# Patient Record
Sex: MALE | Race: WHITE | NOT HISPANIC OR LATINO | Employment: FULL TIME | URBAN - METROPOLITAN AREA
[De-identification: names, ages, dates, MRNs, and addresses within clinical notes are randomized per-mention and may not be internally consistent; named-entity substitution may affect disease eponyms.]

---

## 2017-03-17 ENCOUNTER — OFFICE VISIT (OUTPATIENT)
Dept: INTERNAL MEDICINE | Facility: CLINIC | Age: 67
End: 2017-03-17

## 2017-03-17 VITALS
DIASTOLIC BLOOD PRESSURE: 76 MMHG | OXYGEN SATURATION: 97 % | BODY MASS INDEX: 23.4 KG/M2 | SYSTOLIC BLOOD PRESSURE: 118 MMHG | TEMPERATURE: 97.3 F | HEART RATE: 73 BPM | WEIGHT: 145 LBS

## 2017-03-17 DIAGNOSIS — R73.9 HYPERGLYCEMIA: ICD-10-CM

## 2017-03-17 DIAGNOSIS — E78.00 ELEVATED CHOLESTEROL: ICD-10-CM

## 2017-03-17 DIAGNOSIS — I35.0 NONRHEUMATIC AORTIC VALVE STENOSIS: ICD-10-CM

## 2017-03-17 DIAGNOSIS — I25.10 ATHEROSCLEROSIS OF NATIVE CORONARY ARTERY OF NATIVE HEART WITHOUT ANGINA PECTORIS: Primary | ICD-10-CM

## 2017-03-17 DIAGNOSIS — N18.30 CHRONIC RENAL IMPAIRMENT, STAGE 3 (MODERATE) (HCC): ICD-10-CM

## 2017-03-17 DIAGNOSIS — I10 BENIGN ESSENTIAL HTN: ICD-10-CM

## 2017-03-17 DIAGNOSIS — K52.9 NON-SPECIFIC COLITIS: ICD-10-CM

## 2017-03-17 PROCEDURE — 99214 OFFICE O/P EST MOD 30 MIN: CPT | Performed by: INTERNAL MEDICINE

## 2017-03-17 RX ORDER — BUDESONIDE 3 MG/1
9 CAPSULE, COATED PELLETS ORAL
COMMUNITY
Start: 2017-02-01 | End: 2017-03-17

## 2017-03-17 RX ORDER — LOSARTAN POTASSIUM AND HYDROCHLOROTHIAZIDE 25; 100 MG/1; MG/1
1 TABLET ORAL NIGHTLY
COMMUNITY
Start: 2017-03-13 | End: 2018-09-18 | Stop reason: SDUPTHER

## 2017-03-17 RX ORDER — SULFASALAZINE 500 MG/1
1 TABLET ORAL DAILY
COMMUNITY
Start: 2017-03-11 | End: 2018-07-03

## 2017-03-17 RX ORDER — DIPHENOXYLATE HYDROCHLORIDE AND ATROPINE SULFATE 2.5; .025 MG/1; MG/1
1 TABLET ORAL 4 TIMES DAILY PRN
Qty: 15 TABLET | Refills: 0 | OUTPATIENT
Start: 2017-03-17 | End: 2018-07-25

## 2017-03-17 NOTE — PROGRESS NOTES
Subjective   Bob Monahan is a 66 y.o. male.     History of Present Illness he is here today for follow-up of hypertension, hypercholesterolemia, coronary artery disease, chronic renal insufficiency, and hyperglycemia.  A few months ago he started having diarrhea once again and he was seen by his gastroenterologist.  He underwent colonoscopy and was found to have recurrent colitis.  The first medicine prescribed for him was too expensive and the second caused side effects.  He is about to try his third medication.  The main symptom was diarrhea and fortunately that is improving.  He denied any rectal pain or abdominal pain.  He has not had any melanotic stool.    Myocardial pulmonary standpoint he is been doing well.  He denies any chest pain, dyspnea, or PND.  He denies any focal neurologic symptoms.  His review of systems is otherwise unremarkable.        Review of Systems   Constitutional: Negative for activity change, appetite change, fatigue and fever.   HENT: Negative for trouble swallowing.    Respiratory: Negative for cough, chest tightness, shortness of breath and wheezing.    Cardiovascular: Negative for chest pain, palpitations and leg swelling.   Gastrointestinal: Negative for abdominal pain, anal bleeding, blood in stool, constipation, diarrhea, nausea and vomiting.   Genitourinary: Negative for difficulty urinating, dysuria, flank pain, frequency and hematuria.   Musculoskeletal: Negative for arthralgias and back pain.   Neurological: Positive for speech difficulty. Negative for dizziness, syncope, facial asymmetry, weakness, numbness and headaches.   Psychiatric/Behavioral: Negative for confusion and dysphoric mood. The patient is not nervous/anxious.        Objective   Physical Exam   Constitutional: He is oriented to person, place, and time. Vital signs are normal. He appears well-developed and well-nourished. He is active. He does not appear ill.   Eyes: Conjunctivae are normal.   Fundoscopic  exam:       The right eye shows no AV nicking, no exudate and no hemorrhage.        The left eye shows no AV nicking, no exudate and no hemorrhage.   Neck: Carotid bruit is not present. No thyroid mass and no thyromegaly present.   Cardiovascular: Normal rate, regular rhythm, S1 normal and S2 normal.  Exam reveals no S3 and no S4.    No murmur heard.  Pulses:       Dorsalis pedis pulses are 2+ on the right side, and 1+ on the left side.   Pulmonary/Chest: No tachypnea. No respiratory distress. He has no decreased breath sounds. He has no wheezes. He has no rhonchi. He has no rales.   Abdominal: Soft. Normal appearance and bowel sounds are normal. He exhibits no abdominal bruit and no mass. There is no hepatosplenomegaly. There is no tenderness.       Vascular Status -  His exam exhibits no right foot edema. His exam exhibits no left foot edema.  Neurological: He is alert and oriented to person, place, and time. Gait normal.   Reflex Scores:       Bicep reflexes are 2+ on the right side.  Psychiatric: He has a normal mood and affect. His speech is normal and behavior is normal. Judgment and thought content normal. Cognition and memory are normal.       Assessment/Plan  late 2016 lab showed a BUN of 32 and a creatinine of 1.24.  Glucose was 134.  Total cholesterol was 185 triglycerides 94 HDL cholesterol 47 LDL cholesterol 119    Assessment #1 hypertension-good control #2 coronary artery disease-quiescent #3 aortic stenosis-followed by cardiology and he is asymptomatic at present #3 nonspecific colitis-recent flareup but it appears to have resolved spontaneously #4 hypercholesterolemia-improved on lovastatin # 5 chronic renal insufficiency-mild and stable #6 hyperglycemia-this was discussed with him.    Plan #1 no change medication #2 hemoglobin A1c, CMP, bruits.  Routine follow-up with me in 4 months.

## 2017-03-22 ENCOUNTER — TELEPHONE (OUTPATIENT)
Dept: INTERNAL MEDICINE | Facility: CLINIC | Age: 67
End: 2017-03-22

## 2017-03-22 NOTE — TELEPHONE ENCOUNTER
I just saw him less than 2 weeks ago.  At that time he felt diarrhea was improved.  If he is continuing to have difficulty obviously he needs to call Dr. Cisco Barron his gastroenterologist.

## 2017-03-22 NOTE — TELEPHONE ENCOUNTER
----- Message from Geneva Us sent at 3/22/2017  8:10 AM EDT -----  Contact: pt - Dr Chowdhury's pt - RE: return call  Pt calling and would like a return call regarding issues w/ intestines. Pt would like for Dr Chowdhury to call Dr Barron Gastro. Pt had colonoscopy. Pt would like for Dr Chowdhury to call to discuss outcome of procedure. Pt also would like to inform that pt was prescribed Prednisone for diarrhea. Pt informs has a heart condition. Pt would like return call from Dr Chowdhury only. Could you please return call? Please advise. Thanks        Pt # [phone]

## 2017-03-31 ENCOUNTER — LAB (OUTPATIENT)
Dept: INTERNAL MEDICINE | Facility: CLINIC | Age: 67
End: 2017-03-31

## 2017-03-31 DIAGNOSIS — R73.9 HYPERGLYCEMIA: ICD-10-CM

## 2017-03-31 DIAGNOSIS — N18.30 CHRONIC RENAL IMPAIRMENT, STAGE 3 (MODERATE) (HCC): ICD-10-CM

## 2017-03-31 DIAGNOSIS — E78.00 ELEVATED CHOLESTEROL: ICD-10-CM

## 2017-03-31 LAB
ALBUMIN SERPL-MCNC: 3.49 G/DL (ref 3.4–4.6)
ALBUMIN/GLOB SERPL: 0.9 G/DL
ALP SERPL-CCNC: 87 U/L (ref 46–116)
ALT SERPL W P-5'-P-CCNC: 39 U/L (ref 16–63)
ANION GAP SERPL CALCULATED.3IONS-SCNC: 12 MMOL/L
AST SERPL-CCNC: 18 U/L (ref 7–37)
BILIRUB SERPL-MCNC: 0.4 MG/DL (ref 0.2–1)
BUN BLD-MCNC: 51 MG/DL (ref 6–22)
BUN/CREAT SERPL: 37 (ref 7–25)
CALCIUM SPEC-SCNC: 9 MG/DL (ref 8.6–10.5)
CHLORIDE SERPL-SCNC: 102 MMOL/L (ref 95–107)
CHOLEST SERPL-MCNC: 202 MG/DL (ref 0–200)
CO2 SERPL-SCNC: 21 MMOL/L (ref 23–32)
CREAT BLD-MCNC: 1.38 MG/DL (ref 0.7–1.3)
GFR SERPL CREATININE-BSD FRML MDRD: 52 ML/MIN/1.73
GLOBULIN UR ELPH-MCNC: 3.7 GM/DL
GLUCOSE BLD-MCNC: 109 MG/DL (ref 70–100)
HBA1C MFR BLD: 6.2 % (ref 4–6)
HDLC SERPL-MCNC: 57 MG/DL (ref 40–81)
LDLC SERPL CALC-MCNC: 111 MG/DL (ref 0–100)
LDLC/HDLC SERPL: 1.95 {RATIO}
POTASSIUM BLD-SCNC: 4.7 MMOL/L (ref 3.3–5.3)
PROT SERPL-MCNC: 7.2 G/DL (ref 6.3–8.4)
SODIUM BLD-SCNC: 135 MMOL/L (ref 136–145)
TRIGL SERPL-MCNC: 170 MG/DL (ref 0–150)
VLDLC SERPL-MCNC: 34 MG/DL

## 2017-03-31 PROCEDURE — 80053 COMPREHEN METABOLIC PANEL: CPT | Performed by: INTERNAL MEDICINE

## 2017-03-31 PROCEDURE — 83036 HEMOGLOBIN GLYCOSYLATED A1C: CPT | Performed by: INTERNAL MEDICINE

## 2017-03-31 PROCEDURE — 36415 COLL VENOUS BLD VENIPUNCTURE: CPT | Performed by: INTERNAL MEDICINE

## 2017-03-31 PROCEDURE — 80061 LIPID PANEL: CPT | Performed by: INTERNAL MEDICINE

## 2017-04-03 RX ORDER — BUPROPION HYDROCHLORIDE 150 MG/1
TABLET ORAL
Qty: 90 TABLET | Refills: 1 | Status: SHIPPED | OUTPATIENT
Start: 2017-04-03 | End: 2017-10-07 | Stop reason: SDUPTHER

## 2017-07-27 RX ORDER — LOVASTATIN 40 MG/1
TABLET ORAL
Qty: 90 TABLET | Refills: 0 | Status: SHIPPED | OUTPATIENT
Start: 2017-07-27 | End: 2017-10-24 | Stop reason: SDUPTHER

## 2017-10-09 RX ORDER — BUPROPION HYDROCHLORIDE 150 MG/1
TABLET ORAL
Qty: 90 TABLET | Refills: 0 | Status: SHIPPED | OUTPATIENT
Start: 2017-10-09 | End: 2018-01-11 | Stop reason: SDUPTHER

## 2017-10-24 RX ORDER — LOVASTATIN 40 MG/1
TABLET ORAL
Qty: 90 TABLET | Refills: 0 | Status: SHIPPED | OUTPATIENT
Start: 2017-10-24 | End: 2018-01-11 | Stop reason: SDUPTHER

## 2018-01-09 ENCOUNTER — TELEPHONE (OUTPATIENT)
Dept: INTERNAL MEDICINE | Facility: CLINIC | Age: 68
End: 2018-01-09

## 2018-01-09 NOTE — TELEPHONE ENCOUNTER
"----- Message from Liliana Schmidt sent at 1/9/2018  8:53 AM EST -----  Contact: pt  Pt doesn't have an appt [facility] march  But would like ALL his meds filled.  I said that it was un likely but I would send the request back.  He is out of town and running out of meds.         buPROPion XL (WELLBUTRIN XL) 150 MG 24 hr tablet   clopidogrel (PLAVIX) 75 MG tablet    diphenoxylate-atropine (LOMOTIL) 2.5-0.025 MG per tablet   hydrochlorothiazide (HYDRODIURIL) 12.5 MG tablet   hydroxychloroquine (PLAQUENIL) 200 MG tablet   isosorbide mononitrate (IMDUR) 120 MG 24 hr tablet   losartan-hydrochlorothiazide (HYZAAR) 100-25 MG per tablet    lovastatin (MEVACOR) 40 MG tablet   metoprolol succinate XL (TOPROL-XL) 50 MG 24 hr tablet   nitroglycerin (NITROSTAT) 0.4 MG SL tablet   sulfaSALAzine (AZULFIDINE) 500 MG tablet      [facility] [address] [place] [address] [facility] - [phone] [facility] [phone] FX  \"they can transfer\"    Pt#[phone]    "

## 2018-01-09 NOTE — TELEPHONE ENCOUNTER
There is no appt in system for appt in March his last OV was March of 2017, it has been noted on last refills that patient needed an appt before another refill I have left message on vm for him to inform him that an OV is required before any refills

## 2018-01-11 ENCOUNTER — TELEPHONE (OUTPATIENT)
Dept: INTERNAL MEDICINE | Facility: CLINIC | Age: 68
End: 2018-01-11

## 2018-01-11 RX ORDER — BUPROPION HYDROCHLORIDE 150 MG/1
TABLET ORAL
Qty: 90 TABLET | Refills: 1 | Status: SHIPPED | OUTPATIENT
Start: 2018-01-11 | End: 2018-07-03

## 2018-01-11 RX ORDER — LOVASTATIN 40 MG/1
40 TABLET ORAL NIGHTLY
Qty: 90 TABLET | Refills: 3 | Status: SHIPPED | OUTPATIENT
Start: 2018-01-11 | End: 2018-01-12 | Stop reason: SDUPTHER

## 2018-01-11 NOTE — TELEPHONE ENCOUNTER
----- Message from Liliana Schmidt sent at 1/10/2018  8:29 AM EST -----  Contact: pt  Pt would like a refill for    lovastatin (MEVACOR) 40 MG tablet  buPROPion XL (WELLBUTRIN XL) 150 MG 24 hr tablet    [facility] [address] [facility] [address] [facility] [address] AT [facility] - [phone] [facility] [phone] FX      He is sorry he didn't mean all, and he didn't mean to say he had an appt. He said he will make one for march.  He will make one while on the phone now.    I asked if he received the message and he said yes but wanted me to send another message back.    Pt#[phone]

## 2018-01-12 RX ORDER — LOVASTATIN 40 MG/1
40 TABLET ORAL NIGHTLY
Qty: 30 TABLET | Refills: 0 | Status: SHIPPED | OUTPATIENT
Start: 2018-01-12 | End: 2018-09-18 | Stop reason: SDUPTHER

## 2018-03-16 ENCOUNTER — OFFICE VISIT (OUTPATIENT)
Dept: INTERNAL MEDICINE | Facility: CLINIC | Age: 68
End: 2018-03-16

## 2018-03-16 VITALS
DIASTOLIC BLOOD PRESSURE: 74 MMHG | HEIGHT: 66 IN | RESPIRATION RATE: 14 BRPM | SYSTOLIC BLOOD PRESSURE: 120 MMHG | BODY MASS INDEX: 24.43 KG/M2 | WEIGHT: 152 LBS

## 2018-03-16 DIAGNOSIS — I25.10 CORONARY ARTERY DISEASE INVOLVING NATIVE CORONARY ARTERY OF NATIVE HEART WITHOUT ANGINA PECTORIS: Primary | ICD-10-CM

## 2018-03-16 DIAGNOSIS — I35.0 NONRHEUMATIC AORTIC VALVE STENOSIS: ICD-10-CM

## 2018-03-16 DIAGNOSIS — I10 BENIGN ESSENTIAL HTN: ICD-10-CM

## 2018-03-16 DIAGNOSIS — I73.9 PERIPHERAL VASCULAR DISEASE (HCC): ICD-10-CM

## 2018-03-16 DIAGNOSIS — E78.00 ELEVATED CHOLESTEROL: ICD-10-CM

## 2018-03-16 DIAGNOSIS — N18.30 CHRONIC RENAL IMPAIRMENT, STAGE 3 (MODERATE) (HCC): ICD-10-CM

## 2018-03-16 PROBLEM — K52.9 COLITIS: Status: ACTIVE | Noted: 2018-03-16

## 2018-03-16 PROCEDURE — 99214 OFFICE O/P EST MOD 30 MIN: CPT | Performed by: INTERNAL MEDICINE

## 2018-03-16 RX ORDER — BALSALAZIDE DISODIUM 750 MG/1
3000 CAPSULE ORAL 2 TIMES DAILY
COMMUNITY
Start: 2018-01-26 | End: 2018-07-14 | Stop reason: HOSPADM

## 2018-03-16 NOTE — PROGRESS NOTES
Subjective   Bob Monahan is a 67 y.o. male.     History of Present Illness he is here today for his yearly follow-up which includes coronary artery disease, aortic stenosis, hypercholesterolemia, hypertension, chronic renal insufficiency, and now peripheral vascular disease.  He has not been seen in over year.  He was seen by his gastroenterologist last year and found to have recurrent colitis for which she is on medication.  He is no longer having any abdominal pain, melanotic stool, rectal bleeding, or diarrhea.  He does relate a one year history of slowly worsening dysphagia however.  Twice per week he has dysphagia without odynophagia.  He denies any overt reflux.  He has chronic stable one flight dyspnea on exertion without anginal chest pain.  He denies any PND, swelling in the ankles, dizziness, or focal neurologic episodes.  He does relate a one year history of cramping in his left calf muscle when walking a certain distance.  He is still doing significant manual labor for work.  He has one per night nocturia but no dysuria, hesitancy, or sense of incomplete voiding.  The remainder of his review systems is negative.      Review of Systems   Constitutional: Negative for activity change, appetite change and fatigue.   HENT: Positive for trouble swallowing.    Respiratory: Positive for shortness of breath. Negative for cough, chest tightness and wheezing.    Cardiovascular: Negative for chest pain, palpitations and leg swelling.   Gastrointestinal: Negative for abdominal pain, anal bleeding, blood in stool, constipation, nausea, vomiting and GERD.   Genitourinary: Negative for difficulty urinating, dysuria, flank pain, frequency, hematuria and nocturia.   Neurological: Negative for dizziness, syncope, facial asymmetry, speech difficulty, weakness, numbness and headaches.   Psychiatric/Behavioral: Negative for depressed mood. The patient is not nervous/anxious.        Objective   Physical Exam    Constitutional: He is oriented to person, place, and time. Vital signs are normal. He appears well-developed and well-nourished. He is active. He does not appear ill.   Eyes: Conjunctivae are normal.   Fundoscopic exam:       The right eye shows no AV nicking, no exudate and no hemorrhage.        The left eye shows no AV nicking, no exudate and no hemorrhage.   Neck: Carotid bruit is not present. Thyromegaly present. No thyroid mass present.   Cardiovascular: Normal rate, regular rhythm and S2 normal.  Exam reveals no S3 and no S4.    Murmur heard.   Crescendo systolic murmur is present with a grade of 4/6   Pulses:       Dorsalis pedis pulses are 2+ on the right side, and 0 on the left side.   There is a grade 4/6 murmur of aortic stenosis heard best at the base radiating down the left sternal border toward the apex.   Pulmonary/Chest: No tachypnea. No respiratory distress. He has no decreased breath sounds. He has no wheezes. He has no rhonchi. He has no rales.   Abdominal: Soft. Normal appearance and bowel sounds are normal. He exhibits no abdominal bruit and no mass. There is no hepatosplenomegaly. There is no tenderness.     Vascular Status -  His right foot exhibits normal right foot edema. His left foot exhibits normal left foot edema.  Neurological: He is alert and oriented to person, place, and time. Gait normal.   Reflex Scores:       Bicep reflexes are 2+ on the right side.  Psychiatric: He has a normal mood and affect. His speech is normal and behavior is normal. Judgment and thought content normal. Cognition and memory are normal.         Assessment/Plan assessment #1 coronary artery disease-quiescent #2 aortic stenosis-no progression thus far #3 hypertension-good control #4hypercholesterolemia #5 new-onset peripheral vascular disease.  #6 dysphagia-he needs to have this evaluated by his gastroenterologist.    Plan #1 no change medication #2 CBC, CMP, urinalysis, lipids.  #3 he is to schedule  follow-up with his gastroenterologist #4 left lower extremity arterial Doppler study as soon as possible.  Likely he will need vascular surgery consultation and perhaps intervention.  All of this was discussed with him.  Routine follow-up with me in 3 months.

## 2018-03-22 ENCOUNTER — LAB (OUTPATIENT)
Dept: INTERNAL MEDICINE | Facility: CLINIC | Age: 68
End: 2018-03-22

## 2018-03-22 DIAGNOSIS — N18.30 CHRONIC RENAL IMPAIRMENT, STAGE 3 (MODERATE) (HCC): ICD-10-CM

## 2018-03-22 DIAGNOSIS — I73.9 PERIPHERAL VASCULAR DISEASE (HCC): Primary | ICD-10-CM

## 2018-03-22 DIAGNOSIS — I10 BENIGN ESSENTIAL HTN: ICD-10-CM

## 2018-03-22 DIAGNOSIS — E78.00 ELEVATED CHOLESTEROL: ICD-10-CM

## 2018-03-22 LAB
ALBUMIN SERPL-MCNC: 4.1 G/DL (ref 3.5–5.2)
ALBUMIN/GLOB SERPL: 1.1 G/DL
ALP SERPL-CCNC: 107 U/L (ref 39–117)
ALT SERPL W P-5'-P-CCNC: 14 U/L (ref 1–41)
ANION GAP SERPL CALCULATED.3IONS-SCNC: 13.9 MMOL/L
AST SERPL-CCNC: 33 U/L (ref 1–40)
BASOPHILS # BLD AUTO: 0.04 10*3/MM3 (ref 0–0.2)
BASOPHILS NFR BLD AUTO: 0.6 % (ref 0–1.5)
BILIRUB SERPL-MCNC: 0.5 MG/DL (ref 0.1–1.2)
BILIRUB UR QL STRIP: NEGATIVE
BUN BLD-MCNC: 33 MG/DL (ref 8–23)
BUN/CREAT SERPL: 22.8 (ref 7–25)
CALCIUM SPEC-SCNC: 9.5 MG/DL (ref 8.6–10.5)
CHLORIDE SERPL-SCNC: 99 MMOL/L (ref 98–107)
CHOLEST SERPL-MCNC: 157 MG/DL (ref 0–200)
CLARITY UR: CLEAR
CO2 SERPL-SCNC: 25.1 MMOL/L (ref 22–29)
COLOR UR: YELLOW
CREAT BLD-MCNC: 1.45 MG/DL (ref 0.76–1.27)
EOSINOPHIL # BLD AUTO: 0.25 10*3/MM3 (ref 0–0.7)
EOSINOPHIL # BLD AUTO: 3.6 % (ref 0.3–6.2)
ERYTHROCYTE [DISTWIDTH] IN BLOOD BY AUTOMATED COUNT: 14.2 % (ref 11.5–14.5)
GFR SERPL CREATININE-BSD FRML MDRD: 49 ML/MIN/1.73
GLOBULIN UR ELPH-MCNC: 3.6 GM/DL
GLUCOSE BLD-MCNC: 116 MG/DL (ref 65–99)
GLUCOSE UR STRIP-MCNC: NEGATIVE MG/DL
HCT VFR BLD AUTO: 50.2 % (ref 40.4–52.2)
HDLC SERPL-MCNC: 44 MG/DL (ref 40–60)
HGB BLD-MCNC: 16.7 G/DL (ref 13.7–17.6)
HGB UR QL STRIP.AUTO: NEGATIVE
IMM GRANULOCYTES # BLD: 0 10*3/MM3 (ref 0–0.03)
IMM GRANULOCYTES NFR BLD: 0 % (ref 0–0.5)
KETONES UR QL STRIP: NEGATIVE
LDLC SERPL CALC-MCNC: 92 MG/DL (ref 0–100)
LDLC/HDLC SERPL: 2.09 {RATIO}
LEUKOCYTE ESTERASE UR QL STRIP.AUTO: NEGATIVE
LYMPHOCYTES # BLD AUTO: 1.64 10*3/MM3 (ref 0.9–4.8)
LYMPHOCYTES NFR BLD AUTO: 23.4 % (ref 19.6–45.3)
MCH RBC QN AUTO: 32.3 PG (ref 27–32.7)
MCHC RBC AUTO-ENTMCNC: 33.3 G/DL (ref 32.6–36.4)
MCV RBC AUTO: 97.1 FL (ref 79.8–96.2)
MONOCYTES # BLD AUTO: 0.49 10*3/MM3 (ref 0.2–1.2)
MONOCYTES NFR BLD AUTO: 7 % (ref 5–12)
NEUTROPHILS # BLD AUTO: 4.59 10*3/MM3 (ref 1.9–8.1)
NEUTROPHILS NFR BLD AUTO: 65.4 % (ref 42.7–76)
NITRITE UR QL STRIP: NEGATIVE
PH UR STRIP.AUTO: 7 [PH] (ref 5–8)
PLATELET # BLD AUTO: 205 10*3/MM3 (ref 140–500)
POTASSIUM BLD-SCNC: 4.3 MMOL/L (ref 3.5–5.2)
PROT SERPL-MCNC: 7.7 G/DL (ref 6–8.5)
PROT UR QL STRIP: NEGATIVE
RBC # BLD AUTO: 5.17 10*6/MM3 (ref 4.6–6)
SODIUM BLD-SCNC: 138 MMOL/L (ref 136–145)
SP GR UR STRIP: 1.01 (ref 1–1.03)
TRIGL SERPL-MCNC: 106 MG/DL (ref 0–150)
UROBILINOGEN UR QL STRIP: NORMAL
VLDLC SERPL-MCNC: 21.2 MG/DL (ref 5–40)
WBC # BLD AUTO: 7.01 10*3/MM3 (ref 4.5–10.7)

## 2018-03-22 PROCEDURE — 80061 LIPID PANEL: CPT | Performed by: INTERNAL MEDICINE

## 2018-03-22 PROCEDURE — 81003 URINALYSIS AUTO W/O SCOPE: CPT | Performed by: INTERNAL MEDICINE

## 2018-03-22 PROCEDURE — 80053 COMPREHEN METABOLIC PANEL: CPT | Performed by: INTERNAL MEDICINE

## 2018-03-23 ENCOUNTER — HOSPITAL ENCOUNTER (OUTPATIENT)
Dept: CARDIOLOGY | Facility: HOSPITAL | Age: 68
Discharge: HOME | End: 2018-03-23
Attending: INTERNAL MEDICINE | Admitting: INTERNAL MEDICINE

## 2018-03-23 DIAGNOSIS — I73.9 PERIPHERAL VASCULAR DISEASE (HCC): ICD-10-CM

## 2018-03-23 LAB
BH CV LOWER ARTERIAL LEFT ABI RATIO: 0.88
BH CV LOWER ARTERIAL LEFT CALF RATIO: 0.84
BH CV LOWER ARTERIAL LEFT DORSALIS PEDIS SYS MAX: 82 MMHG
BH CV LOWER ARTERIAL LEFT GREAT TOE SYS MAX: 66 MMHG
BH CV LOWER ARTERIAL LEFT HIGH THIGH SYS MAX: 111 MMHG
BH CV LOWER ARTERIAL LEFT LOW THIGH SYS MAX: 109 MMHG
BH CV LOWER ARTERIAL LEFT POPLITEAL SYS MAX: 83 MMHG
BH CV LOWER ARTERIAL LEFT POST EX ABI RATIO: 0.47
BH CV LOWER ARTERIAL LEFT POST TIBIAL SYS MAX: 87 MMHG
BH CV LOWER ARTERIAL LEFT TBI RATIO: 0.67
BH CV LOWER ARTERIAL RIGHT ABI RATIO: 1.08
BH CV LOWER ARTERIAL RIGHT CALF RATIO: 1.1
BH CV LOWER ARTERIAL RIGHT DORSALIS PEDIS SYS MAX: 107 MMHG
BH CV LOWER ARTERIAL RIGHT GREAT TOE SYS MAX: 93 MMHG
BH CV LOWER ARTERIAL RIGHT HIGH THIGH SYS MAX: 131 MMHG
BH CV LOWER ARTERIAL RIGHT LOW THIGH SYS MAX: 129 MMHG
BH CV LOWER ARTERIAL RIGHT POPLITEAL SYS MAX: 109 MMHG
BH CV LOWER ARTERIAL RIGHT POST EX ABI RATIO: 0.81
BH CV LOWER ARTERIAL RIGHT POST TIBIAL SYS MAX: 102 MMHG
BH CV LOWER ARTERIAL RIGHT TBI RATIO: 0.94
UPPER ARTERIAL LEFT ARM BRACHIAL SYS MAX: 66 MMHG
UPPER ARTERIAL RIGHT ARM BRACHIAL SYS MAX: 99 MMHG

## 2018-03-23 PROCEDURE — 93924 LWR XTR VASC STDY BILAT: CPT

## 2018-03-23 NOTE — PROGRESS NOTES
Lower extremity arterial doppler without stress ordered for 03/23/18 by Dr. Chowdhury. Indication for exam was claudication. Patient complained of left calf tightness when walking. Called Dr. Chowdhury's office at 1250 to get ordered changed. He gave verbal approval to change order to a lower extremity arterial doppler with stress.

## 2018-03-29 ENCOUNTER — TELEPHONE (OUTPATIENT)
Dept: INTERNAL MEDICINE | Facility: CLINIC | Age: 68
End: 2018-03-29

## 2018-03-29 DIAGNOSIS — I73.9 PERIPHERAL VASCULAR DISEASE (HCC): Primary | ICD-10-CM

## 2018-03-29 NOTE — TELEPHONE ENCOUNTER
----- Message from Melani Madrigal sent at 3/29/2018  8:17 AM EDT -----  Contact: Patient  Please call patient with ultra sound results. Please advise    Patient:[phone]

## 2018-05-15 ENCOUNTER — TRANSCRIBE ORDERS (OUTPATIENT)
Dept: ADMINISTRATIVE | Facility: HOSPITAL | Age: 68
End: 2018-05-15

## 2018-05-15 DIAGNOSIS — I65.23 CAROTID STENOSIS, BILATERAL: Primary | ICD-10-CM

## 2018-06-01 ENCOUNTER — HOSPITAL ENCOUNTER (OUTPATIENT)
Dept: CT IMAGING | Facility: HOSPITAL | Age: 68
Discharge: HOME | End: 2018-06-01
Attending: SURGERY

## 2018-06-01 ENCOUNTER — HOSPITAL ENCOUNTER (OUTPATIENT)
Dept: RADIOLOGY | Facility: HOSPITAL | Age: 68
Discharge: HOME | End: 2018-06-01
Attending: SURGERY

## 2018-06-01 ENCOUNTER — HOSPITAL ENCOUNTER (OUTPATIENT)
Dept: RADIOLOGY | Facility: HOSPITAL | Age: 68
Discharge: HOME | End: 2018-06-01
Attending: SURGERY | Admitting: SURGERY

## 2018-06-01 VITALS
OXYGEN SATURATION: 98 % | DIASTOLIC BLOOD PRESSURE: 62 MMHG | HEART RATE: 82 BPM | TEMPERATURE: 97.1 F | SYSTOLIC BLOOD PRESSURE: 111 MMHG | RESPIRATION RATE: 16 BRPM

## 2018-06-01 DIAGNOSIS — I65.23 CAROTID STENOSIS, BILATERAL: ICD-10-CM

## 2018-06-01 LAB — CREAT BLDA-MCNC: 1.6 MG/DL (ref 0.6–1.3)

## 2018-06-01 PROCEDURE — 70496 CT ANGIOGRAPHY HEAD: CPT

## 2018-06-01 PROCEDURE — 70498 CT ANGIOGRAPHY NECK: CPT

## 2018-06-01 PROCEDURE — 96360 HYDRATION IV INFUSION INIT: CPT

## 2018-06-01 PROCEDURE — 82565 ASSAY OF CREATININE: CPT

## 2018-06-01 PROCEDURE — 96361 HYDRATE IV INFUSION ADD-ON: CPT

## 2018-06-01 PROCEDURE — 0 IOPAMIDOL PER 1 ML: Performed by: SURGERY

## 2018-06-01 RX ORDER — SODIUM CHLORIDE 9 MG/ML
100 INJECTION, SOLUTION INTRAVENOUS CONTINUOUS
Status: ACTIVE | OUTPATIENT
Start: 2018-06-01 | End: 2018-06-01

## 2018-06-01 RX ORDER — HYDROXYCHLOROQUINE SULFATE 200 MG/1
200 TABLET, FILM COATED ORAL
COMMUNITY
Start: 2013-03-13 | End: 2018-06-01

## 2018-06-01 RX ADMIN — IOPAMIDOL 95 ML: 755 INJECTION, SOLUTION INTRAVENOUS at 11:04

## 2018-06-01 RX ADMIN — SODIUM CHLORIDE 100 ML/HR: 9 INJECTION, SOLUTION INTRAVENOUS at 09:50

## 2018-06-01 NOTE — NURSING NOTE
Fluids finished infusing. Patient's IV discontinued and patient ambulated towards [facility] without difficulty.

## 2018-06-18 ENCOUNTER — OFFICE VISIT (OUTPATIENT)
Dept: INTERNAL MEDICINE | Facility: CLINIC | Age: 68
End: 2018-06-18

## 2018-06-18 VITALS
WEIGHT: 151 LBS | SYSTOLIC BLOOD PRESSURE: 102 MMHG | HEIGHT: 66 IN | DIASTOLIC BLOOD PRESSURE: 70 MMHG | BODY MASS INDEX: 24.27 KG/M2

## 2018-06-18 DIAGNOSIS — E78.00 ELEVATED CHOLESTEROL: ICD-10-CM

## 2018-06-18 DIAGNOSIS — I10 BENIGN ESSENTIAL HTN: ICD-10-CM

## 2018-06-18 DIAGNOSIS — K52.9 COLITIS: ICD-10-CM

## 2018-06-18 DIAGNOSIS — I65.22 STENOSIS OF LEFT CAROTID ARTERY: Primary | ICD-10-CM

## 2018-06-18 DIAGNOSIS — I25.10 CORONARY ARTERY DISEASE INVOLVING NATIVE CORONARY ARTERY OF NATIVE HEART WITHOUT ANGINA PECTORIS: ICD-10-CM

## 2018-06-18 PROBLEM — I73.9 PERIPHERAL VASCULAR DISEASE: Status: RESOLVED | Noted: 2018-03-16 | Resolved: 2018-06-18

## 2018-06-18 PROCEDURE — 99213 OFFICE O/P EST LOW 20 MIN: CPT | Performed by: INTERNAL MEDICINE

## 2018-06-18 NOTE — PROGRESS NOTES
Subjective   Bob Monahan is a 68 y.o. male.     History of Present Illness he is here today for follow-up of coronary artery disease, chronic renal insufficiency, and recently diagnosed left carotid artery stenosis.  He was referred to vascular surgery a few months ago for what was expected to be peripheral vascular disease involving the left lower extremity.  Evaluation proved essentially normal but complete review ultimately showed significant left carotid artery stenosis.  He is scheduled for endarterectomy next month.  He has been asymptomatic without any dizziness or focal neurologic episodes.  He has already been seen by his cardiologist and cleared for surgery.  He denies any PND or chest discomfort.  He does not have any dyspnea on exertion.  There has been no swelling in the ankles.        Review of Systems   Constitutional: Negative for activity change, appetite change and fatigue.   HENT: Negative for trouble swallowing.    Respiratory: Negative for cough, chest tightness, shortness of breath and wheezing.    Cardiovascular: Negative for chest pain, palpitations and leg swelling.   Gastrointestinal: Negative for abdominal pain.   Genitourinary: Negative for flank pain and hematuria.   Neurological: Negative for dizziness, syncope, facial asymmetry, speech difficulty, weakness, numbness and headache.   Psychiatric/Behavioral: Negative for depressed mood. The patient is not nervous/anxious.        Objective   Physical Exam   Constitutional: He is oriented to person, place, and time. He appears well-developed and well-nourished. He is active. He does not appear ill.   Eyes: Conjunctivae are normal.   Neck: Carotid bruit is not present.   Cardiovascular: Normal rate, regular rhythm, S1 normal and S2 normal.  Exam reveals no S3 and no S4.    Murmur heard.   Systolic murmur is present with a grade of 3/6   There is a grade 3 to 4/6 systolic ejection murmur heard best at the base radiating down the left  sternal border   Pulmonary/Chest: No tachypnea. No respiratory distress. He has no decreased breath sounds. He has no wheezes. He has no rhonchi. He has no rales.   Abdominal: Soft. Normal appearance and bowel sounds are normal. He exhibits no abdominal bruit and no mass. There is no hepatosplenomegaly. There is no tenderness.     Vascular Status -  His right foot exhibits no edema. His left foot exhibits no edema.  Neurological: He is alert and oriented to person, place, and time. Gait normal.   Psychiatric: He has a normal mood and affect. His speech is normal and behavior is normal. Judgment and thought content normal. Cognition and memory are normal.         Assessment/Plan recent lab showed BUN 33 creatinine 1.45.  CBC normal.  Total cholesterol 157 triglycerides 106 HDL cholesterol 44 LDL cholesterol 92.  Arterial Doppler of the legs showed mild digital disease on the left and normal findings on the right.  CT Naa of the head showed left subclavian artery stenosis of moderate degree.  The left carotid artery was greater than 70% stenosed.    Assessment #1 hypercholesterolemia-fair control #2 chronic renal insufficiency-moderate and stable #3 cerebral vascular disease-asymptomatic at present    Plan #1 no change medication #2 carotid artery endarterectomy scheduled for July 13.  Routine follow-up in 4 months.

## 2018-07-03 ENCOUNTER — HOSPITAL ENCOUNTER (OUTPATIENT)
Dept: GENERAL RADIOLOGY | Facility: HOSPITAL | Age: 68
Discharge: HOME | End: 2018-07-03
Admitting: SURGERY

## 2018-07-03 ENCOUNTER — APPOINTMENT (OUTPATIENT)
Dept: PREADMISSION TESTING | Facility: HOSPITAL | Age: 68
End: 2018-07-03

## 2018-07-03 VITALS
WEIGHT: 155 LBS | DIASTOLIC BLOOD PRESSURE: 75 MMHG | HEART RATE: 65 BPM | TEMPERATURE: 98.2 F | HEIGHT: 66 IN | BODY MASS INDEX: 24.91 KG/M2 | OXYGEN SATURATION: 98 % | RESPIRATION RATE: 20 BRPM | SYSTOLIC BLOOD PRESSURE: 124 MMHG

## 2018-07-03 LAB
ALBUMIN SERPL-MCNC: 4.4 G/DL (ref 3.5–5.2)
ALBUMIN/GLOB SERPL: 1.6 G/DL
ALP SERPL-CCNC: 104 U/L (ref 39–117)
ALT SERPL W P-5'-P-CCNC: 11 U/L (ref 1–41)
ANION GAP SERPL CALCULATED.3IONS-SCNC: 12.9 MMOL/L
APTT PPP: 27.3 SECONDS (ref 22.7–35.4)
AST SERPL-CCNC: 18 U/L (ref 1–40)
BACTERIA UR QL AUTO: NORMAL /HPF
BILIRUB SERPL-MCNC: 0.3 MG/DL (ref 0.1–1.2)
BILIRUB UR QL STRIP: NEGATIVE
BUN BLD-MCNC: 39 MG/DL (ref 8–23)
BUN/CREAT SERPL: 19.8 (ref 7–25)
CALCIUM SPEC-SCNC: 9.2 MG/DL (ref 8.6–10.5)
CHLORIDE SERPL-SCNC: 100 MMOL/L (ref 98–107)
CLARITY UR: CLEAR
CO2 SERPL-SCNC: 25.1 MMOL/L (ref 22–29)
COLOR UR: YELLOW
CREAT BLD-MCNC: 1.97 MG/DL (ref 0.76–1.27)
DEPRECATED RDW RBC AUTO: 48.2 FL (ref 37–54)
ERYTHROCYTE [DISTWIDTH] IN BLOOD BY AUTOMATED COUNT: 13.7 % (ref 11.5–14.5)
GFR SERPL CREATININE-BSD FRML MDRD: 34 ML/MIN/1.73
GLOBULIN UR ELPH-MCNC: 2.8 GM/DL
GLUCOSE BLD-MCNC: 83 MG/DL (ref 65–99)
GLUCOSE UR STRIP-MCNC: NEGATIVE MG/DL
HCT VFR BLD AUTO: 45.2 % (ref 40.4–52.2)
HGB BLD-MCNC: 15.2 G/DL (ref 13.7–17.6)
HGB UR QL STRIP.AUTO: NEGATIVE
HYALINE CASTS UR QL AUTO: NORMAL /LPF
INR PPP: 1.02 (ref 0.9–1.1)
KETONES UR QL STRIP: NEGATIVE
LEUKOCYTE ESTERASE UR QL STRIP.AUTO: NEGATIVE
MCH RBC QN AUTO: 32.5 PG (ref 27–32.7)
MCHC RBC AUTO-ENTMCNC: 33.6 G/DL (ref 32.6–36.4)
MCV RBC AUTO: 96.8 FL (ref 79.8–96.2)
NITRITE UR QL STRIP: NEGATIVE
PH UR STRIP.AUTO: 6.5 [PH] (ref 5–8)
PLATELET # BLD AUTO: 177 10*3/MM3 (ref 140–500)
PMV BLD AUTO: 10 FL (ref 6–12)
POTASSIUM BLD-SCNC: 4.7 MMOL/L (ref 3.5–5.2)
PROT SERPL-MCNC: 7.2 G/DL (ref 6–8.5)
PROT UR QL STRIP: NEGATIVE
PROTHROMBIN TIME: 13.2 SECONDS (ref 11.7–14.2)
RBC # BLD AUTO: 4.67 10*6/MM3 (ref 4.6–6)
RBC # UR: NORMAL /HPF
REF LAB TEST METHOD: NORMAL
SODIUM BLD-SCNC: 138 MMOL/L (ref 136–145)
SP GR UR STRIP: 1.02 (ref 1–1.03)
SQUAMOUS #/AREA URNS HPF: NORMAL /HPF
UROBILINOGEN UR QL STRIP: NORMAL
WBC NRBC COR # BLD: 8.21 10*3/MM3 (ref 4.5–10.7)
WBC UR QL AUTO: NORMAL /HPF

## 2018-07-03 PROCEDURE — 81001 URINALYSIS AUTO W/SCOPE: CPT | Performed by: SURGERY

## 2018-07-03 PROCEDURE — 36415 COLL VENOUS BLD VENIPUNCTURE: CPT

## 2018-07-03 PROCEDURE — 85730 THROMBOPLASTIN TIME PARTIAL: CPT | Performed by: SURGERY

## 2018-07-03 PROCEDURE — 85027 COMPLETE CBC AUTOMATED: CPT | Performed by: SURGERY

## 2018-07-03 PROCEDURE — 80053 COMPREHEN METABOLIC PANEL: CPT | Performed by: SURGERY

## 2018-07-03 PROCEDURE — 71046 X-RAY EXAM CHEST 2 VIEWS: CPT

## 2018-07-03 PROCEDURE — 85610 PROTHROMBIN TIME: CPT | Performed by: SURGERY

## 2018-07-03 RX ORDER — BUPROPION HYDROCHLORIDE 150 MG/1
150 TABLET ORAL NIGHTLY
COMMUNITY
End: 2018-07-25 | Stop reason: SDUPTHER

## 2018-07-03 NOTE — DISCHARGE INSTRUCTIONS
Take the following medications the morning of surgery with a small sip of water:    As Instructed by MD in pre op instructions    General Instructions:  • Do not eat solid food after midnight the night before surgery.  • You may drink clear liquids day of surgery but must stop at least one hour before your hospital arrival time.  • It is beneficial for you to have a clear drink that contains carbohydrates the day of surgery.  We suggest a 12 to 20 ounce bottle of Gatorade or Powerade for non-diabetic patients or a 12 to 20 ounce bottle of G2 or Powerade Zero for diabetic patients. (Pediatric patients, are not advised to drink a 12 to 20 ounce carbohydrate drink)    Clear liquids are liquids you can see through.  Nothing red in color.     Plain water                               Sports drinks  Sodas                                   Gelatin (Jell-O)  Fruit juices without pulp such as white grape juice and apple juice  Popsicles that contain no fruit or yogurt  Tea or coffee (no cream or milk added)  Gatorade / Powerade  G2 / Powerade Zero    • Infants may have breast milk up to four hours before surgery.  • Infants drinking formula may drink formula up to six hours before surgery.   • Patients who avoid smoking, chewing tobacco and alcohol for 4 weeks prior to surgery have a reduced risk of post-operative complications.  Quit smoking as many days before surgery as you can.  • Do not smoke, use chewing tobacco or drink alcohol the day of surgery.   • If applicable bring your C-PAP/ BI-PAP machine.  • Bring any papers given to you in the doctor’s office.  • Wear clean comfortable clothes and socks.  • Do not wear contact lenses or make-up.  Bring a case for your glasses.   • Bring crutches or walker if applicable.  • Remove all piercings.  Leave jewelry and any other valuables at home.  • Hair extensions with metal clips must be removed prior to surgery.  • The Pre-Admission Testing nurse will instruct you to bring  medications if unable to obtain an accurate list in Pre-Admission Testing.        If you were given a blood bank ID arm band remember to bring it with you the day of surgery.    Preventing a Surgical Site Infection:  • For 2 to 3 days before surgery, avoid shaving with a razor because the razor can irritate skin and make it easier to develop an infection.    • Any areas of open skin can increase the risk of a post-operative wound infection by allowing bacteria to enter and travel throughout the body.  Notify your surgeon if you have any skin wounds / rashes even if it is not near the expected surgical site.  The area will need assessed to determine if surgery should be delayed until it is healed.  • The night prior to surgery sleep in a clean bed with clean clothing.  Do not allow pets to sleep with you.  • Shower on the morning of surgery using a fresh bar of anti-bacterial soap (such as Dial) and clean washcloth.  Dry with a clean towel and dress in clean clothing.  • Ask your surgeon if you will be receiving antibiotics prior to surgery.  • Make sure you, your family, and all healthcare providers clean their hands with soap and water or an alcohol based hand  before caring for you or your wound.    Day of surgery:7/13/18   0530  Upon arrival, a Pre-op nurse and Anesthesiologist will review your health history, obtain vital signs, and answer questions you may have.  The only belongings needed at this time will be your home medications and if applicable your C-PAP/BI-PAP machine.  If you are staying overnight your family can leave the rest of your belongings in the car and bring them to your room later.  A Pre-op nurse will start an IV and you may receive medication in preparation for surgery, including something to help you relax.  Your family will be able to see you in the Pre-op area.  While you are in surgery your family should notify the waiting room  if they leave the waiting room area  and provide a contact phone number.    Please be aware that surgery does come with discomfort.  We want to make every effort to control your discomfort so please discuss any uncontrolled symptoms with your nurse.   Your doctor will most likely have prescribed pain medications.      If you are going home after surgery you will receive individualized written care instructions before being discharged.  A responsible adult must drive you to and from the hospital on the day of your surgery and stay with you for 24 hours.    If you are staying overnight following surgery, you will be transported to your hospital room following the recovery period.  [facility] has all private rooms.    You have received a list of surgical assistants for your reference.  If you have any questions please call Pre-Admission Testing at [phone].  Deductibles and co-payments are collected on the day of service. Please be prepared to pay the required co-pay, deductible or deposit on the day of service as defined by your plan.

## 2018-07-13 ENCOUNTER — HOSPITAL ENCOUNTER (INPATIENT)
Facility: HOSPITAL | Age: 68
LOS: 1 days | Discharge: HOME | End: 2018-07-14
Attending: SURGERY | Admitting: SURGERY

## 2018-07-13 ENCOUNTER — ANESTHESIA EVENT (OUTPATIENT)
Dept: PERIOP | Facility: HOSPITAL | Age: 68
End: 2018-07-13

## 2018-07-13 ENCOUNTER — ANESTHESIA (OUTPATIENT)
Dept: PERIOP | Facility: HOSPITAL | Age: 68
End: 2018-07-13

## 2018-07-13 ENCOUNTER — APPOINTMENT (OUTPATIENT)
Dept: CARDIOLOGY | Facility: HOSPITAL | Age: 68
End: 2018-07-13
Attending: SURGERY

## 2018-07-13 PROBLEM — I65.23 CAROTID STENOSIS, ASYMPTOMATIC, BILATERAL: Status: ACTIVE | Noted: 2018-07-13

## 2018-07-13 LAB
BH CV XLRA MEAS LEFT DIST CCA EDV: 28 CM/SEC
BH CV XLRA MEAS LEFT DIST CCA PSV: 64 CM/SEC
BH CV XLRA MEAS LEFT PROX ECA EDV: 7 CM/SEC
BH CV XLRA MEAS LEFT PROX ECA PSV: 47 CM/SEC
BH CV XLRA MEAS LEFT PROX ICA EDV: 49 CM/SEC
BH CV XLRA MEAS LEFT PROX ICA PSV: 113 CM/SEC
GLUCOSE BLDC GLUCOMTR-MCNC: 148 MG/DL (ref 70–130)

## 2018-07-13 PROCEDURE — 03CL0ZZ EXTIRPATION OF MATTER FROM LEFT INTERNAL CAROTID ARTERY, OPEN APPROACH: ICD-10-PCS | Performed by: SURGERY

## 2018-07-13 PROCEDURE — 25010000003 CEFAZOLIN PER 500 MG: Performed by: SURGERY

## 2018-07-13 PROCEDURE — 03UL0JZ SUPPLEMENT LEFT INTERNAL CAROTID ARTERY WITH SYNTHETIC SUBSTITUTE, OPEN APPROACH: ICD-10-PCS | Performed by: SURGERY

## 2018-07-13 PROCEDURE — 25010000002 DEXAMETHASONE PER 1 MG: Performed by: NURSE ANESTHETIST, CERTIFIED REGISTERED

## 2018-07-13 PROCEDURE — 25010000002 PROPOFOL 10 MG/ML EMULSION: Performed by: NURSE ANESTHETIST, CERTIFIED REGISTERED

## 2018-07-13 PROCEDURE — 25010000002 PHENYLEPHRINE PER 1 ML: Performed by: NURSE ANESTHETIST, CERTIFIED REGISTERED

## 2018-07-13 PROCEDURE — 85347 COAGULATION TIME ACTIVATED: CPT

## 2018-07-13 PROCEDURE — 25010000003 CEFAZOLIN IN DEXTROSE 2-4 GM/100ML-% SOLUTION: Performed by: SURGERY

## 2018-07-13 PROCEDURE — 93882 EXTRACRANIAL UNI/LTD STUDY: CPT

## 2018-07-13 PROCEDURE — 03CJ0ZZ EXTIRPATION OF MATTER FROM LEFT COMMON CAROTID ARTERY, OPEN APPROACH: ICD-10-PCS | Performed by: SURGERY

## 2018-07-13 PROCEDURE — 25010000002 HEPARIN (PORCINE) PER 1000 UNITS: Performed by: NURSE ANESTHETIST, CERTIFIED REGISTERED

## 2018-07-13 PROCEDURE — 25010000002 ONDANSETRON PER 1 MG: Performed by: NURSE ANESTHETIST, CERTIFIED REGISTERED

## 2018-07-13 PROCEDURE — 25010000002 PHENYLEPHRINE 10 MG/ML SOLUTION 5 ML VIAL: Performed by: SURGERY

## 2018-07-13 PROCEDURE — C1768 GRAFT, VASCULAR: HCPCS | Performed by: SURGERY

## 2018-07-13 PROCEDURE — 25010000002 HEPARIN (PORCINE) PER 1000 UNITS: Performed by: SURGERY

## 2018-07-13 PROCEDURE — 25010000002 MIDAZOLAM PER 1 MG: Performed by: ANESTHESIOLOGY

## 2018-07-13 PROCEDURE — 94799 UNLISTED PULMONARY SVC/PX: CPT

## 2018-07-13 PROCEDURE — 25010000002 FENTANYL CITRATE (PF) 100 MCG/2ML SOLUTION: Performed by: ANESTHESIOLOGY

## 2018-07-13 PROCEDURE — 03CN0Z6 EXTIRPATE MATTER FROM L EXT CAROTID, BIFURC, OPEN: ICD-10-PCS | Performed by: SURGERY

## 2018-07-13 PROCEDURE — 25010000002 PROTAMINE SULFATE PER 10 MG: Performed by: NURSE ANESTHETIST, CERTIFIED REGISTERED

## 2018-07-13 PROCEDURE — 82962 GLUCOSE BLOOD TEST: CPT

## 2018-07-13 PROCEDURE — 25010000002 HYDROMORPHONE PER 4 MG: Performed by: NURSE ANESTHETIST, CERTIFIED REGISTERED

## 2018-07-13 DEVICE — VASCU-GUARD PERIPHERAL VASCULAR PATCH IS PREPARED FROM BOVINE PERICARDIUM WHICH IS CROSS-LINKED WITH GLUTARALDEHYDE. THE PERICARDIUM IS PROCURED FROM CATTLE ORIGINATING IN THE UNITED STATES. VASCU-GUARD PERIPHERAL VASCULAR PATCH IS CHEMICALLY STERILIZED USING ETHANOL AND PROPYLENE OXIDE. VASCU-GUARD PERIPHERAL VASCULAR PATCH HAS BEEN TREATED WITH 1 MOLAR SODIUM HYDROXIDE FOR A MINIMUM OF 60 MINUTES AT 20 - 25°C.  VASCU-GUARD PERIPHERAL VASCULAR PATCH IS PACKAGED IN A CONTAINER FILLED WITH STERILE, NON-PYROGENIC WATER CONTAINING PROPYLENE OXIDE. THE CONTENTS OF THE UNOPENED, UNDAMAGED CONTAINER ARE STERILE.
Type: IMPLANTABLE DEVICE | Site: CAROTID | Status: FUNCTIONAL
Brand: VASCU-GUARD PERIPHERAL VASCULAR PATCH

## 2018-07-13 RX ORDER — SODIUM CHLORIDE 0.9 % (FLUSH) 0.9 %
1-10 SYRINGE (ML) INJECTION AS NEEDED
Status: DISCONTINUED | OUTPATIENT
Start: 2018-07-13 | End: 2018-07-13 | Stop reason: HOSPADM

## 2018-07-13 RX ORDER — HYDROMORPHONE HYDROCHLORIDE 1 MG/ML
0.5 INJECTION, SOLUTION INTRAMUSCULAR; INTRAVENOUS; SUBCUTANEOUS
Status: DISCONTINUED | OUTPATIENT
Start: 2018-07-13 | End: 2018-07-13 | Stop reason: HOSPADM

## 2018-07-13 RX ORDER — CLOPIDOGREL BISULFATE 75 MG/1
75 TABLET ORAL DAILY
Status: DISCONTINUED | OUTPATIENT
Start: 2018-07-13 | End: 2018-07-14 | Stop reason: HOSPADM

## 2018-07-13 RX ORDER — DEXAMETHASONE SODIUM PHOSPHATE 10 MG/ML
INJECTION INTRAMUSCULAR; INTRAVENOUS AS NEEDED
Status: DISCONTINUED | OUTPATIENT
Start: 2018-07-13 | End: 2018-07-13 | Stop reason: SURG

## 2018-07-13 RX ORDER — PROMETHAZINE HYDROCHLORIDE 25 MG/1
25 SUPPOSITORY RECTAL ONCE AS NEEDED
Status: DISCONTINUED | OUTPATIENT
Start: 2018-07-13 | End: 2018-07-13 | Stop reason: HOSPADM

## 2018-07-13 RX ORDER — ONDANSETRON 4 MG/1
4 TABLET, ORALLY DISINTEGRATING ORAL EVERY 6 HOURS PRN
Status: DISCONTINUED | OUTPATIENT
Start: 2018-07-13 | End: 2018-07-14 | Stop reason: HOSPADM

## 2018-07-13 RX ORDER — FLUMAZENIL 0.1 MG/ML
0.2 INJECTION INTRAVENOUS AS NEEDED
Status: DISCONTINUED | OUTPATIENT
Start: 2018-07-13 | End: 2018-07-13 | Stop reason: HOSPADM

## 2018-07-13 RX ORDER — DIPHENOXYLATE HYDROCHLORIDE AND ATROPINE SULFATE 2.5; .025 MG/1; MG/1
1 TABLET ORAL 4 TIMES DAILY PRN
Status: DISCONTINUED | OUTPATIENT
Start: 2018-07-13 | End: 2018-07-14 | Stop reason: HOSPADM

## 2018-07-13 RX ORDER — BUPROPION HYDROCHLORIDE 150 MG/1
150 TABLET ORAL NIGHTLY
Status: DISCONTINUED | OUTPATIENT
Start: 2018-07-13 | End: 2018-07-14 | Stop reason: HOSPADM

## 2018-07-13 RX ORDER — LIDOCAINE HYDROCHLORIDE 20 MG/ML
INJECTION, SOLUTION INFILTRATION; PERINEURAL AS NEEDED
Status: DISCONTINUED | OUTPATIENT
Start: 2018-07-13 | End: 2018-07-13 | Stop reason: SURG

## 2018-07-13 RX ORDER — SODIUM CHLORIDE, SODIUM LACTATE, POTASSIUM CHLORIDE, CALCIUM CHLORIDE 600; 310; 30; 20 MG/100ML; MG/100ML; MG/100ML; MG/100ML
9 INJECTION, SOLUTION INTRAVENOUS CONTINUOUS
Status: DISCONTINUED | OUTPATIENT
Start: 2018-07-13 | End: 2018-07-13

## 2018-07-13 RX ORDER — HYDROCODONE BITARTRATE AND ACETAMINOPHEN 5; 325 MG/1; MG/1
1 TABLET ORAL EVERY 4 HOURS PRN
Status: DISCONTINUED | OUTPATIENT
Start: 2018-07-13 | End: 2018-07-14 | Stop reason: HOSPADM

## 2018-07-13 RX ORDER — LABETALOL HYDROCHLORIDE 5 MG/ML
5 INJECTION, SOLUTION INTRAVENOUS
Status: DISCONTINUED | OUTPATIENT
Start: 2018-07-13 | End: 2018-07-13 | Stop reason: HOSPADM

## 2018-07-13 RX ORDER — PROMETHAZINE HYDROCHLORIDE 25 MG/1
12.5 TABLET ORAL ONCE AS NEEDED
Status: DISCONTINUED | OUTPATIENT
Start: 2018-07-13 | End: 2018-07-13 | Stop reason: HOSPADM

## 2018-07-13 RX ORDER — PROMETHAZINE HYDROCHLORIDE 25 MG/ML
12.5 INJECTION, SOLUTION INTRAMUSCULAR; INTRAVENOUS ONCE AS NEEDED
Status: DISCONTINUED | OUTPATIENT
Start: 2018-07-13 | End: 2018-07-13 | Stop reason: HOSPADM

## 2018-07-13 RX ORDER — PROMETHAZINE HYDROCHLORIDE 25 MG/1
25 TABLET ORAL ONCE AS NEEDED
Status: DISCONTINUED | OUTPATIENT
Start: 2018-07-13 | End: 2018-07-13 | Stop reason: HOSPADM

## 2018-07-13 RX ORDER — METOPROLOL SUCCINATE 50 MG/1
50 TABLET, EXTENDED RELEASE ORAL EVERY MORNING
Status: DISCONTINUED | OUTPATIENT
Start: 2018-07-14 | End: 2018-07-14 | Stop reason: HOSPADM

## 2018-07-13 RX ORDER — NALOXONE HCL 0.4 MG/ML
0.4 VIAL (ML) INJECTION
Status: DISCONTINUED | OUTPATIENT
Start: 2018-07-13 | End: 2018-07-14 | Stop reason: HOSPADM

## 2018-07-13 RX ORDER — MIDAZOLAM HYDROCHLORIDE 1 MG/ML
2 INJECTION INTRAMUSCULAR; INTRAVENOUS
Status: DISCONTINUED | OUTPATIENT
Start: 2018-07-13 | End: 2018-07-13 | Stop reason: HOSPADM

## 2018-07-13 RX ORDER — PROTAMINE SULFATE 10 MG/ML
INJECTION, SOLUTION INTRAVENOUS AS NEEDED
Status: DISCONTINUED | OUTPATIENT
Start: 2018-07-13 | End: 2018-07-13 | Stop reason: SURG

## 2018-07-13 RX ORDER — BALSALAZIDE DISODIUM 750 MG/1
3000 CAPSULE ORAL 2 TIMES DAILY
Status: DISCONTINUED | OUTPATIENT
Start: 2018-07-13 | End: 2018-07-14 | Stop reason: HOSPADM

## 2018-07-13 RX ORDER — ONDANSETRON 2 MG/ML
INJECTION INTRAMUSCULAR; INTRAVENOUS AS NEEDED
Status: DISCONTINUED | OUTPATIENT
Start: 2018-07-13 | End: 2018-07-13 | Stop reason: SURG

## 2018-07-13 RX ORDER — GLYCOPYRROLATE 0.2 MG/ML
INJECTION INTRAMUSCULAR; INTRAVENOUS AS NEEDED
Status: DISCONTINUED | OUTPATIENT
Start: 2018-07-13 | End: 2018-07-13 | Stop reason: SURG

## 2018-07-13 RX ORDER — HEPARIN SODIUM 1000 [USP'U]/ML
INJECTION, SOLUTION INTRAVENOUS; SUBCUTANEOUS AS NEEDED
Status: DISCONTINUED | OUTPATIENT
Start: 2018-07-13 | End: 2018-07-13 | Stop reason: SURG

## 2018-07-13 RX ORDER — DIPHENHYDRAMINE HYDROCHLORIDE 50 MG/ML
12.5 INJECTION INTRAMUSCULAR; INTRAVENOUS
Status: DISCONTINUED | OUTPATIENT
Start: 2018-07-13 | End: 2018-07-13 | Stop reason: HOSPADM

## 2018-07-13 RX ORDER — MIDAZOLAM HYDROCHLORIDE 1 MG/ML
1 INJECTION INTRAMUSCULAR; INTRAVENOUS
Status: DISCONTINUED | OUTPATIENT
Start: 2018-07-13 | End: 2018-07-13 | Stop reason: HOSPADM

## 2018-07-13 RX ORDER — HYDROCODONE BITARTRATE AND ACETAMINOPHEN 7.5; 325 MG/1; MG/1
1 TABLET ORAL ONCE AS NEEDED
Status: DISCONTINUED | OUTPATIENT
Start: 2018-07-13 | End: 2018-07-13 | Stop reason: HOSPADM

## 2018-07-13 RX ORDER — OXYCODONE AND ACETAMINOPHEN 7.5; 325 MG/1; MG/1
1 TABLET ORAL ONCE AS NEEDED
Status: DISCONTINUED | OUTPATIENT
Start: 2018-07-13 | End: 2018-07-13 | Stop reason: HOSPADM

## 2018-07-13 RX ORDER — ONDANSETRON 2 MG/ML
4 INJECTION INTRAMUSCULAR; INTRAVENOUS ONCE AS NEEDED
Status: DISCONTINUED | OUTPATIENT
Start: 2018-07-13 | End: 2018-07-13 | Stop reason: HOSPADM

## 2018-07-13 RX ORDER — EPHEDRINE SULFATE 50 MG/ML
5 INJECTION, SOLUTION INTRAVENOUS ONCE AS NEEDED
Status: COMPLETED | OUTPATIENT
Start: 2018-07-13 | End: 2018-07-13

## 2018-07-13 RX ORDER — NALOXONE HCL 0.4 MG/ML
0.2 VIAL (ML) INJECTION AS NEEDED
Status: DISCONTINUED | OUTPATIENT
Start: 2018-07-13 | End: 2018-07-13 | Stop reason: HOSPADM

## 2018-07-13 RX ORDER — NITROGLYCERIN 0.4 MG/1
0.4 TABLET SUBLINGUAL
Status: DISCONTINUED | OUTPATIENT
Start: 2018-07-13 | End: 2018-07-14 | Stop reason: HOSPADM

## 2018-07-13 RX ORDER — ASPIRIN 81 MG/1
81 TABLET ORAL DAILY
Status: DISCONTINUED | OUTPATIENT
Start: 2018-07-13 | End: 2018-07-14 | Stop reason: HOSPADM

## 2018-07-13 RX ORDER — PROPOFOL 10 MG/ML
VIAL (ML) INTRAVENOUS AS NEEDED
Status: DISCONTINUED | OUTPATIENT
Start: 2018-07-13 | End: 2018-07-13 | Stop reason: SURG

## 2018-07-13 RX ORDER — FENTANYL CITRATE 50 UG/ML
50 INJECTION, SOLUTION INTRAMUSCULAR; INTRAVENOUS
Status: DISCONTINUED | OUTPATIENT
Start: 2018-07-13 | End: 2018-07-13 | Stop reason: HOSPADM

## 2018-07-13 RX ORDER — HEPARIN SODIUM 5000 [USP'U]/ML
5000 INJECTION, SOLUTION INTRAVENOUS; SUBCUTANEOUS EVERY 12 HOURS SCHEDULED
Status: DISCONTINUED | OUTPATIENT
Start: 2018-07-14 | End: 2018-07-14 | Stop reason: HOSPADM

## 2018-07-13 RX ORDER — CEFAZOLIN SODIUM 2 G/100ML
2 INJECTION, SOLUTION INTRAVENOUS EVERY 8 HOURS
Status: COMPLETED | OUTPATIENT
Start: 2018-07-13 | End: 2018-07-14

## 2018-07-13 RX ORDER — CEFAZOLIN SODIUM 2 G/100ML
2 INJECTION, SOLUTION INTRAVENOUS ONCE
Status: COMPLETED | OUTPATIENT
Start: 2018-07-13 | End: 2018-07-13

## 2018-07-13 RX ORDER — ONDANSETRON 4 MG/1
4 TABLET, FILM COATED ORAL EVERY 6 HOURS PRN
Status: DISCONTINUED | OUTPATIENT
Start: 2018-07-13 | End: 2018-07-14 | Stop reason: HOSPADM

## 2018-07-13 RX ORDER — LIDOCAINE HYDROCHLORIDE 40 MG/ML
SOLUTION TOPICAL AS NEEDED
Status: DISCONTINUED | OUTPATIENT
Start: 2018-07-13 | End: 2018-07-13 | Stop reason: SURG

## 2018-07-13 RX ORDER — ISOSORBIDE MONONITRATE 60 MG/1
120 TABLET, EXTENDED RELEASE ORAL EVERY MORNING
Status: DISCONTINUED | OUTPATIENT
Start: 2018-07-14 | End: 2018-07-14 | Stop reason: HOSPADM

## 2018-07-13 RX ORDER — SODIUM CHLORIDE, SODIUM LACTATE, POTASSIUM CHLORIDE, CALCIUM CHLORIDE 600; 310; 30; 20 MG/100ML; MG/100ML; MG/100ML; MG/100ML
125 INJECTION, SOLUTION INTRAVENOUS CONTINUOUS
Status: DISCONTINUED | OUTPATIENT
Start: 2018-07-13 | End: 2018-07-14

## 2018-07-13 RX ORDER — ONDANSETRON 2 MG/ML
4 INJECTION INTRAMUSCULAR; INTRAVENOUS EVERY 6 HOURS PRN
Status: DISCONTINUED | OUTPATIENT
Start: 2018-07-13 | End: 2018-07-14 | Stop reason: HOSPADM

## 2018-07-13 RX ORDER — ROCURONIUM BROMIDE 10 MG/ML
INJECTION, SOLUTION INTRAVENOUS AS NEEDED
Status: DISCONTINUED | OUTPATIENT
Start: 2018-07-13 | End: 2018-07-13 | Stop reason: SURG

## 2018-07-13 RX ORDER — MORPHINE SULFATE 2 MG/ML
2 INJECTION, SOLUTION INTRAMUSCULAR; INTRAVENOUS EVERY 4 HOURS PRN
Status: DISCONTINUED | OUTPATIENT
Start: 2018-07-13 | End: 2018-07-14 | Stop reason: HOSPADM

## 2018-07-13 RX ORDER — FAMOTIDINE 10 MG/ML
20 INJECTION, SOLUTION INTRAVENOUS ONCE
Status: COMPLETED | OUTPATIENT
Start: 2018-07-13 | End: 2018-07-13

## 2018-07-13 RX ADMIN — CEFAZOLIN SODIUM 2 G: 2 INJECTION, SOLUTION INTRAVENOUS at 23:52

## 2018-07-13 RX ADMIN — ONDANSETRON 4 MG: 2 INJECTION INTRAMUSCULAR; INTRAVENOUS at 09:36

## 2018-07-13 RX ADMIN — HEPARIN SODIUM 7500 UNITS: 1000 INJECTION, SOLUTION INTRAVENOUS; SUBCUTANEOUS at 08:18

## 2018-07-13 RX ADMIN — BUPROPION HYDROCHLORIDE 150 MG: 150 TABLET, EXTENDED RELEASE ORAL at 20:40

## 2018-07-13 RX ADMIN — ROCURONIUM BROMIDE 15 MG: 10 INJECTION INTRAVENOUS at 07:51

## 2018-07-13 RX ADMIN — CEFAZOLIN SODIUM 2 G: 2 INJECTION, SOLUTION INTRAVENOUS at 17:27

## 2018-07-13 RX ADMIN — PHENYLEPHRINE HYDROCHLORIDE 100 MCG: 10 INJECTION INTRAVENOUS at 07:45

## 2018-07-13 RX ADMIN — LIDOCAINE HYDROCHLORIDE 1 EACH: 40 SOLUTION TOPICAL at 07:30

## 2018-07-13 RX ADMIN — PHENYLEPHRINE HYDROCHLORIDE 0.8 MCG/KG/MIN: 10 INJECTION INTRAVENOUS at 11:15

## 2018-07-13 RX ADMIN — FENTANYL CITRATE 50 MCG: 50 INJECTION INTRAMUSCULAR; INTRAVENOUS at 06:47

## 2018-07-13 RX ADMIN — PHENYLEPHRINE HYDROCHLORIDE 0.6 MCG/KG/MIN: 10 INJECTION, SOLUTION INTRAMUSCULAR; INTRAVENOUS; SUBCUTANEOUS at 07:42

## 2018-07-13 RX ADMIN — SODIUM CHLORIDE, POTASSIUM CHLORIDE, SODIUM LACTATE AND CALCIUM CHLORIDE 125 ML/HR: 600; 310; 30; 20 INJECTION, SOLUTION INTRAVENOUS at 23:11

## 2018-07-13 RX ADMIN — PHENYLEPHRINE HYDROCHLORIDE 100 MCG: 10 INJECTION INTRAVENOUS at 07:35

## 2018-07-13 RX ADMIN — EPHEDRINE SULFATE 5 MG: 50 INJECTION, SOLUTION INTRAVENOUS at 11:55

## 2018-07-13 RX ADMIN — SODIUM CHLORIDE, POTASSIUM CHLORIDE, SODIUM LACTATE AND CALCIUM CHLORIDE 9 ML/HR: 600; 310; 30; 20 INJECTION, SOLUTION INTRAVENOUS at 06:46

## 2018-07-13 RX ADMIN — HEPARIN SODIUM 2500 UNITS: 1000 INJECTION, SOLUTION INTRAVENOUS; SUBCUTANEOUS at 09:12

## 2018-07-13 RX ADMIN — FAMOTIDINE 20 MG: 10 INJECTION, SOLUTION INTRAVENOUS at 06:46

## 2018-07-13 RX ADMIN — PHENYLEPHRINE HYDROCHLORIDE 100 MCG: 10 INJECTION INTRAVENOUS at 08:08

## 2018-07-13 RX ADMIN — CLOPIDOGREL 75 MG: 75 TABLET, FILM COATED ORAL at 17:27

## 2018-07-13 RX ADMIN — SODIUM CHLORIDE, POTASSIUM CHLORIDE, SODIUM LACTATE AND CALCIUM CHLORIDE 500 ML: 600; 310; 30; 20 INJECTION, SOLUTION INTRAVENOUS at 13:24

## 2018-07-13 RX ADMIN — BALSALAZIDE DISODIUM 3000 MG: 750 CAPSULE ORAL at 20:40

## 2018-07-13 RX ADMIN — MIDAZOLAM HYDROCHLORIDE 2 MG: 2 INJECTION, SOLUTION INTRAMUSCULAR; INTRAVENOUS at 06:47

## 2018-07-13 RX ADMIN — HYDROMORPHONE HYDROCHLORIDE 0.5 MG: 1 INJECTION, SOLUTION INTRAMUSCULAR; INTRAVENOUS; SUBCUTANEOUS at 10:15

## 2018-07-13 RX ADMIN — PHENYLEPHRINE HYDROCHLORIDE 100 MCG: 10 INJECTION INTRAVENOUS at 08:45

## 2018-07-13 RX ADMIN — PHENYLEPHRINE HYDROCHLORIDE 100 MCG: 10 INJECTION INTRAVENOUS at 07:51

## 2018-07-13 RX ADMIN — PHENYLEPHRINE HYDROCHLORIDE 100 MCG: 10 INJECTION INTRAVENOUS at 09:27

## 2018-07-13 RX ADMIN — SODIUM CHLORIDE, POTASSIUM CHLORIDE, SODIUM LACTATE AND CALCIUM CHLORIDE: 600; 310; 30; 20 INJECTION, SOLUTION INTRAVENOUS at 10:00

## 2018-07-13 RX ADMIN — CEFAZOLIN SODIUM 2 G: 2 INJECTION, SOLUTION INTRAVENOUS at 07:35

## 2018-07-13 RX ADMIN — PROPOFOL 150 MG: 10 INJECTION, EMULSION INTRAVENOUS at 07:30

## 2018-07-13 RX ADMIN — FENTANYL CITRATE 50 MCG: 50 INJECTION INTRAMUSCULAR; INTRAVENOUS at 06:50

## 2018-07-13 RX ADMIN — PROTAMINE SULFATE 40 MG: 10 INJECTION, SOLUTION INTRAVENOUS at 09:28

## 2018-07-13 RX ADMIN — PHENYLEPHRINE HYDROCHLORIDE 0.5 MCG/KG/MIN: 10 INJECTION INTRAVENOUS at 11:06

## 2018-07-13 RX ADMIN — LIDOCAINE HYDROCHLORIDE 20 MG: 20 INJECTION, SOLUTION INFILTRATION; PERINEURAL at 07:30

## 2018-07-13 RX ADMIN — SODIUM CHLORIDE, POTASSIUM CHLORIDE, SODIUM LACTATE AND CALCIUM CHLORIDE 125 ML/HR: 600; 310; 30; 20 INJECTION, SOLUTION INTRAVENOUS at 12:00

## 2018-07-13 RX ADMIN — ROCURONIUM BROMIDE 10 MG: 10 INJECTION INTRAVENOUS at 08:58

## 2018-07-13 RX ADMIN — ASPIRIN 81 MG: 81 TABLET, DELAYED RELEASE ORAL at 17:27

## 2018-07-13 RX ADMIN — PHENYLEPHRINE HYDROCHLORIDE 100 MCG: 10 INJECTION INTRAVENOUS at 09:14

## 2018-07-13 RX ADMIN — DEXAMETHASONE SODIUM PHOSPHATE 8 MG: 10 INJECTION INTRAMUSCULAR; INTRAVENOUS at 07:57

## 2018-07-13 RX ADMIN — FENTANYL CITRATE 50 MCG: 50 INJECTION INTRAMUSCULAR; INTRAVENOUS at 07:54

## 2018-07-13 RX ADMIN — GLYCOPYRROLATE 0.2 MG: 0.2 INJECTION INTRAMUSCULAR; INTRAVENOUS at 07:43

## 2018-07-13 RX ADMIN — ROCURONIUM BROMIDE 35 MG: 10 INJECTION INTRAVENOUS at 07:30

## 2018-07-13 RX ADMIN — SUGAMMADEX 150 MG: 100 INJECTION, SOLUTION INTRAVENOUS at 09:45

## 2018-07-13 NOTE — PERIOPERATIVE NURSING NOTE
1030 DR. DUVALL AT BEDSIDE TO SEE PT. BLADDER SCANNER DONE B/C PT UNABLE TO VOID, SHOWS [phone] UNRINE PER SCANNER. ORDER F/C, DONE AND LR 500CC BOLUS ORDERED AND DONE.

## 2018-07-13 NOTE — ANESTHESIA PROCEDURE NOTES
Airway  Urgency: elective    Airway not difficult    General Information and Staff    Patient location during procedure: OR  Anesthesiologist: GEORGE CRUZ  CRNA: ROBY PALM    Indications and Patient Condition  Indications for airway management: airway protection    Preoxygenated: yes  Mask difficulty assessment: 1 - vent by mask    Final Airway Details  Final airway type: endotracheal airway      Successful airway: ETT  Cuffed: yes   Successful intubation technique: direct laryngoscopy  Facilitating devices/methods: intubating stylet  Endotracheal tube insertion site: oral  Blade: Rivera  Blade size: #2  ETT size: 7.5 mm  Cormack-Lehane Classification: grade IIa - partial view of glottis  Placement verified by: chest auscultation and capnometry   Cuff volume (mL): 6  Measured from: gums  ETT to gums (cm): 20  Number of attempts at approach: 1
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
## 2018-07-13 NOTE — ANESTHESIA PREPROCEDURE EVALUATION
Anesthesia Evaluation     no history of anesthetic complications:               Airway   Mallampati: III  TM distance: >3 FB  Neck ROM: full  Small opening and Narrow palate  Dental      Pulmonary    (+) a smoker Current Abstained day of surgery, shortness of breath,   Cardiovascular     (+) hypertension, valvular problems/murmurs AI, past MI , CAD, cardiac stents (X3) angina, PVD, hyperlipidemia,  carotid artery disease      Neuro/Psych  (+) weakness,     (-) TIA, CVA, dizziness/light headedness, syncope  GI/Hepatic/Renal/Endo    (+)   renal disease CRI,   (-) hepatitis    Musculoskeletal     Abdominal    Substance History      OB/GYN          Other            Phys Exam Other: Cleft palate repair                Anesthesia Plan    ASA 3     general   (A line)  intravenous induction   Anesthetic plan and risks discussed with patient.

## 2018-07-13 NOTE — PERIOPERATIVE NURSING NOTE
HR DROPPED TO 38, DR. DUVALL NOTIFIED, YOSHI GTT STOPPED, DR. WAYNE CRUZ NOTIFIED, AT BEDSIDE, IF HR DROPS AGAIN MAY GIVE EPHEDRINE 10 MG

## 2018-07-13 NOTE — OP NOTE
Date of Admission:  7/13/2018  Today's Date:  07/13/18  Tristen Kinney MD    Preoperative Diagnosis:   Severe asymptomatic carotid artery stenosis.    Postoperative Diagnosis:   Same    Procedure Performed:   Left carotid endarterectomy with intraoperative shunting, bovine pericardial patch angioplasty, and completion duplex.    CPT:[id number]    Surgeon:   Tristen Kinney MD    Assistant:    Caio Baugh KCSA    Anesthesia:   General    Estimated Blood Loss:   25-50 cc    Findings:    Successful carotid endarterectomy with no neurologic deficits postop.    Intraoperative duplex failed that showed any B-mode defects and good waveforms.    Implants:      Implant Name Type Inv. Item Serial No.  Lot No. LRB No. Used   PTCH VASCUGUARD [id number] - [id number] Implant PTCH VASCUGUARD 0.8X8CM   [employer] [id number] Left 1       Staff:   Circulator: Jayla Brito RN; Radha Galarza RN  Scrub Person: Jayant Landeros  Assistant: Carmelo Hankins    Specimen:   none    Complications:   none    Dispo:   to PACU    Indication for procedure:   68 y.o. male with Severe asymptomatic left carotid artery stenosis.  He was extensively counseled on the risk, benefits, and alternatives to undergoing left carotid endarterectomy.  He agreed to the procedure informed consent was obtained.     Description of procedure:   The patient was taken to the operating room, anesthesia was induced without difficulty. Surgical sites were prepped and draped in the usual sterile manner. A full surgical timeout was performed. A standard carotid endarterectomy incision was made just anterior to the sternocleidomastoid. Bovie electrocautery was used to dissect down through the subcutaneous tissue down to the platysmal muscle. Sternocleidomastoid was retracted and dissected laterally exposing the jugular. The jugular was dissected upon its length. Facial vein was identified and divided between silk suture ligatures, dissecting down to  the common carotid artery down the level of the omohyoid. This was circumferentially controlled with umbilical tape. Meticulous distal dissection was performed up to the bulb and then up into the distal internal carotid artery. It was visible where the plaque on the carotid had stopped. I obtained distal control circumferentially of the common carotid artery. The hypoglossal and vagus nerve were clearly identified and protected. There was a branch of the ECA that was tethering the hypoglossal nerves, this was divided between silk suture ligatures and given some mobility to the hypoglossal so it would not be placed under any tension throughout the case. At this point 7500 units of heparin was administered. Serial ACTs were checked throughout the case to ensure therapeutic anticoagulation. After 5 minutes and a therapeutic ACT, clamps were applied to the distal RCA, ECA, superior thyroid and common carotid artery. Arteriotomy was made with 11 blade scalpel. Campbell scissors were used to open up the carotid bifurcation of this severely calcified plaque here that was near occlusive. I was able to perform a traditional endarterectomy of this and eversion endarterectomy of the external carotid artery. Meticulous care was taken to ensure no loose debris. At the distal end point I elected to place 2 tacking posterior sutures. A shunt was placed prior to the endarterectomy, it was a 3 x 5 external shunt. Shunt was interrogated throughout the endarterectomy to ensure that it was widely patent. A 6-0 Prolene was used to stitch in the Bovine pericardial patch. Shunt was removed, appropriate flushing maneuvers were taken. Good hemostasis was obtained in the endarterectomy area. Vascular lab was summoned to the operating suite and completion duplexes were performed and felt to demonstrate no defects and good wave forms throughout. At this point Protamine was administered, meticulous hemostasis was obtained. Sternocleidomastoid was  reapproximated using 3-0 Vicryl. Platysma was reapproximated with 4-0 Vicryl and the skin was closed. The patient awoke from anesthesia with no focal motor or neurologic deficits. Transferred to recovery room in stable condition.     Tristen Kinney MD  07/13/18     Active Hospital Problems    Diagnosis Date Noted   • **Stenosis of left carotid artery [I65.22] 06/18/2018   • Coronary artery disease involving native coronary artery of native heart [I25.10] 03/16/2018   • History of skin cancer [Z85.828] 12/16/2016   • Chronic renal impairment, stage 3 (moderate) [N18.3] 11/18/2016   • Benign essential HTN [I10] 02/24/2016   • H/O acute myocardial infarction [I25.2] 02/24/2016   • H/O coronary angioplasty [Z98.61] 02/24/2016      Resolved Hospital Problems    Diagnosis Date Noted Date Resolved   No resolved problems to display.

## 2018-07-13 NOTE — ANESTHESIA PROCEDURE NOTES
Arterial Line    Start time: 7/13/2018 6:44 AM  Stop Time:7/13/2018 6:56 AM       Line placed for hemodynamic monitoring.  Performed By   Anesthesiologist: GEORGE CRUZ  Preanesthetic Checklist  Completed: patient identified, pre-op evaluation, timeout performed, IV checked, risks and benefits discussed and monitors and equipment checked  Arterial Line Prep   Sterile Tech: gloves  Prep: ChloraPrep  Patient monitoring: blood pressure monitoring, continuous pulse oximetry and EKG  Arterial Line Procedure   Laterality:right  Location:  radial artery  Catheter size: 20 G   Guidance: palpation technique  Successful placement: yes          Post Assessment   Dressing Type: occlusive dressing applied and secured with tape.   Complications no  Circ/Move/Sens Assessment: normal.

## 2018-07-13 NOTE — ANESTHESIA POSTPROCEDURE EVALUATION
Patient: Bob Monahan    Procedure Summary     Date:  07/13/18 Room / Location:  [facility] OR [address] [place] [address] MAIN OR    Anesthesia Start:  0724 Anesthesia Stop:  1006    Procedure:  LT CAROTID ENDARTERECTOMY WITH INTRAOPERATIVE CAROTID ARTERY DUPLEX SCAN (Left Neck) Diagnosis:      Surgeon:  Tristen Kinney MD Provider:  Niki Vu MD    Anesthesia Type:  general ASA Status:  3          Anesthesia Type: general  Last vitals  BP   (!) 88/47 (07/13/18 1155)   Temp   36.4 °C (97.6 °F) (07/13/18 1215)   Pulse   58 (07/13/18 1200)   Resp   16 (07/13/18 1145)     SpO2   97 % (07/13/18 1215)     Post Anesthesia Care and Evaluation    Patient location during evaluation: bedside  Patient participation: complete - patient participated  Level of consciousness: sleepy but conscious  Pain score: 0  Pain management: adequate  Airway patency: patent  Anesthetic complications: No anesthetic complications    Cardiovascular status: acceptable  Respiratory status: acceptable  Hydration status: acceptable    Comments: BP (!) 88/47   Pulse 58   Temp 36.4 °C (97.6 °F) (Oral)   Resp 16   SpO2 97%

## 2018-07-13 NOTE — PERIOPERATIVE NURSING NOTE
"Dr. WAYNE Vu and Dr. Kinney both aware of patient  Taking Nitroglycerin tablet on Monday for an \"anxiety attack\" and okay to proceed with OR.     "

## 2018-07-14 VITALS
HEIGHT: 66 IN | WEIGHT: 165 LBS | OXYGEN SATURATION: 95 % | RESPIRATION RATE: 16 BRPM | BODY MASS INDEX: 26.52 KG/M2 | TEMPERATURE: 98 F | SYSTOLIC BLOOD PRESSURE: 115 MMHG | HEART RATE: 63 BPM | DIASTOLIC BLOOD PRESSURE: 65 MMHG

## 2018-07-14 LAB
ANION GAP SERPL CALCULATED.3IONS-SCNC: 12 MMOL/L
BASOPHILS # BLD AUTO: 0.01 10*3/MM3 (ref 0–0.2)
BASOPHILS NFR BLD AUTO: 0.1 % (ref 0–1.5)
BUN BLD-MCNC: 37 MG/DL (ref 8–23)
BUN/CREAT SERPL: 24.2 (ref 7–25)
CALCIUM SPEC-SCNC: 8.5 MG/DL (ref 8.6–10.5)
CHLORIDE SERPL-SCNC: 102 MMOL/L (ref 98–107)
CO2 SERPL-SCNC: 22 MMOL/L (ref 22–29)
CREAT BLD-MCNC: 1.53 MG/DL (ref 0.76–1.27)
DEPRECATED RDW RBC AUTO: 47.1 FL (ref 37–54)
EOSINOPHIL # BLD AUTO: 0 10*3/MM3 (ref 0–0.7)
EOSINOPHIL NFR BLD AUTO: 0 % (ref 0.3–6.2)
ERYTHROCYTE [DISTWIDTH] IN BLOOD BY AUTOMATED COUNT: 13.6 % (ref 11.5–14.5)
GFR SERPL CREATININE-BSD FRML MDRD: 46 ML/MIN/1.73
GLUCOSE BLD-MCNC: 127 MG/DL (ref 65–99)
GLUCOSE BLDC GLUCOMTR-MCNC: 136 MG/DL (ref 70–130)
HCT VFR BLD AUTO: 39.8 % (ref 40.4–52.2)
HGB BLD-MCNC: 13.6 G/DL (ref 13.7–17.6)
IMM GRANULOCYTES # BLD: 0.02 10*3/MM3 (ref 0–0.03)
IMM GRANULOCYTES NFR BLD: 0.2 % (ref 0–0.5)
LYMPHOCYTES # BLD AUTO: 0.83 10*3/MM3 (ref 0.9–4.8)
LYMPHOCYTES NFR BLD AUTO: 7.3 % (ref 19.6–45.3)
MCH RBC QN AUTO: 32.8 PG (ref 27–32.7)
MCHC RBC AUTO-ENTMCNC: 34.2 G/DL (ref 32.6–36.4)
MCV RBC AUTO: 95.9 FL (ref 79.8–96.2)
MONOCYTES # BLD AUTO: 0.89 10*3/MM3 (ref 0.2–1.2)
MONOCYTES NFR BLD AUTO: 7.8 % (ref 5–12)
NEUTROPHILS # BLD AUTO: 9.62 10*3/MM3 (ref 1.9–8.1)
NEUTROPHILS NFR BLD AUTO: 84.8 % (ref 42.7–76)
NRBC BLD MANUAL-RTO: 0 /100 WBC (ref 0–0)
PLATELET # BLD AUTO: 156 10*3/MM3 (ref 140–500)
PMV BLD AUTO: 9.9 FL (ref 6–12)
POTASSIUM BLD-SCNC: 4.5 MMOL/L (ref 3.5–5.2)
RBC # BLD AUTO: 4.15 10*6/MM3 (ref 4.6–6)
SODIUM BLD-SCNC: 136 MMOL/L (ref 136–145)
WBC NRBC COR # BLD: 11.35 10*3/MM3 (ref 4.5–10.7)

## 2018-07-14 PROCEDURE — 82962 GLUCOSE BLOOD TEST: CPT

## 2018-07-14 PROCEDURE — 25010000002 HEPARIN (PORCINE) PER 1000 UNITS: Performed by: SURGERY

## 2018-07-14 PROCEDURE — 85025 COMPLETE CBC W/AUTO DIFF WBC: CPT | Performed by: SURGERY

## 2018-07-14 PROCEDURE — 80048 BASIC METABOLIC PNL TOTAL CA: CPT | Performed by: SURGERY

## 2018-07-14 RX ORDER — HYDROCODONE BITARTRATE AND ACETAMINOPHEN 5; 325 MG/1; MG/1
1 TABLET ORAL EVERY 4 HOURS PRN
Qty: 40 TABLET | Refills: 0 | Status: SHIPPED | OUTPATIENT
Start: 2018-07-14 | End: 2018-07-23

## 2018-07-14 RX ADMIN — CLOPIDOGREL 75 MG: 75 TABLET, FILM COATED ORAL at 09:35

## 2018-07-14 RX ADMIN — HEPARIN SODIUM 5000 UNITS: 5000 INJECTION INTRAVENOUS; SUBCUTANEOUS at 09:35

## 2018-07-14 RX ADMIN — ASPIRIN 81 MG: 81 TABLET, DELAYED RELEASE ORAL at 09:35

## 2018-07-14 RX ADMIN — SODIUM CHLORIDE, POTASSIUM CHLORIDE, SODIUM LACTATE AND CALCIUM CHLORIDE 125 ML/HR: 600; 310; 30; 20 INJECTION, SOLUTION INTRAVENOUS at 07:40

## 2018-07-14 RX ADMIN — BALSALAZIDE DISODIUM 3000 MG: 750 CAPSULE ORAL at 09:35

## 2018-07-14 NOTE — PLAN OF CARE
Problem: Patient Care Overview  Goal: Plan of Care Review  Outcome: Ongoing (interventions implemented as appropriate)   07/13/18 2055   Coping/Psychosocial   Plan of Care Reviewed With patient   Plan of Care Review   Progress improving   OTHER   Outcome Summary Patient on leah drip at 0.2mcg, did not do well off of leah once on the floor from surgery, patient VSS, alert and oriented, no s/s of distress      Goal: Individualization and Mutuality  Outcome: Ongoing (interventions implemented as appropriate)      Problem: Carotid Endarterectomy (Adult)  Goal: Signs and Symptoms of Listed Potential Problems Will be Absent, Minimized or Managed (Carotid Endarterectomy)  Outcome: Ongoing (interventions implemented as appropriate)   07/13/18 2055   Goal/Outcome Evaluation   Problems Assessed (Carotid Endarterectomy) all   Problems Present (Carotid Endarterect) none

## 2018-07-14 NOTE — DISCHARGE SUMMARY
Date of Discharge:  7/14/2018    Discharge Diagnosis: Asymptomatic right carotid artery stenosis    Presenting Problem/History of Present Illness  Carotid stenosis, asymptomatic, bilateral [I65.23]       Hospital Course  Patient is a 68 y.o. male presented with asymptomatic high-grade right carotid artery stenosis and had a right carotid endarterectomy on 7/13/18.  Postoperatively he was able to be returned to the vascular surgery unit where he had a benign course.  On postoperative day #1 his vital signs were normal, his blood pressure is well-controlled.  He tolerated a regular diet well.  He had no neurologic deficit or headache.  His tongue was midline.  His incision was clean, dry, intact.  His pain was well-controlled.  He was discharged with a prescription for Norco and an appointment to see Dr. Kinney in 2 weeks.      Procedures Performed  Procedure(s):  LT CAROTID ENDARTERECTOMY WITH INTRAOPERATIVE CAROTID ARTERY DUPLEX SCAN       Consults:   Consults     No orders found for last 30 day(s).          Pertinent Test Results: none    Condition on Discharge:  good    Vital Signs  Temp:  [97.6 °F (36.4 °C)-98.2 °F (36.8 °C)] 98 °F (36.7 °C)  Heart Rate:  [43-85] 63  Resp:  [14-18] 16  BP: ()/(47-84) 115/65  Arterial Line BP: ()/(43-96) 115/57    Physical Exam:   see note    Discharge Disposition  Home or Self Care    Discharge Medications     Discharge Medications      New Medications      Instructions Start Date   HYDROcodone-acetaminophen 5-325 MG per tablet  Commonly known as:  NORCO   1 tablet, Oral, Every 4 Hours PRN         Continue These Medications      Instructions Start Date   aspirin 81 MG tablet   81 mg, Oral, Daily, Pt to check on when to stop aspirin He has papers at home      clopidogrel 75 MG tablet  Commonly known as:  PLAVIX   75 mg, Oral, Daily, To stop 5 days before surgery      diphenoxylate-atropine 2.5-0.025 MG per tablet  Commonly known as:  LOMOTIL   1 tablet, Oral, 4  Times Daily PRN      GOODYS EXTRA STRENGTH PO   1 package, Oral, As Needed      isosorbide mononitrate 120 MG 24 hr tablet  Commonly known as:  IMDUR   120 mg, Oral, Every Morning      losartan-hydrochlorothiazide 100-25 MG per tablet  Commonly known as:  HYZAAR   1 tablet, Oral, Nightly      lovastatin 40 MG tablet  Commonly known as:  MEVACOR   40 mg, Oral, Nightly      metoprolol succinate XL 50 MG 24 hr tablet  Commonly known as:  TOPROL-XL   50 mg, Oral, Every Morning      nitroglycerin 0.4 MG SL tablet  Commonly known as:  NITROSTAT   0.4 mg, Sublingual, Every 5 Minutes PRN      WELLBUTRIN  MG 24 hr tablet  Generic drug:  buPROPion XL   150 mg, Oral, Nightly         Stop These Medications    balsalazide 750 MG capsule  Commonly known as:  COLAZAL            Discharge Diet:     Activity at Discharge:   Activity Instructions     Discharge Activity Restrictions       1) No driving for while taking narcotics.   2) May shower / sponge bathe, do not submerge incision underwater.  3) Do not lift / push / pull than 10 lbs.          Follow-up Appointments  Future Appointments  Date Time Provider Department Center   7/25/2018 1:45 PM PATRICK Dhillon [facility] None     Additional Instructions for the Follow-ups that You Need to Schedule     Discharge Follow-up with Specified Provider: Call for an appointment with Dr. Kinney in 2 weeks.; 2 Weeks    As directed      To:  Call for an appointment with Dr. Kinney in 2 weeks.    Follow Up:  2 Weeks               Test Results Pending at Discharge       Avi Ahn MD  07/14/18  11:51 AM

## 2018-07-14 NOTE — PLAN OF CARE
Problem: Patient Care Overview  Goal: Plan of Care Review  Outcome: Ongoing (interventions implemented as appropriate)   07/14/18 0126   Coping/Psychosocial   Plan of Care Reviewed With patient   Plan of Care Review   Progress improving   OTHER   Outcome Summary Monitor pain,labs,and vitals. Pt is still currently on Sami gtt at 0.2mcg. F/C to BSD-urine output adequate. Will cont. to monitor.     Goal: Individualization and Mutuality  Outcome: Ongoing (interventions implemented as appropriate)    Goal: Discharge Needs Assessment  Outcome: Ongoing (interventions implemented as appropriate)    Goal: Interprofessional Rounds/Family Conf  Outcome: Ongoing (interventions implemented as appropriate)      Problem: Carotid Endarterectomy (Adult)  Goal: Signs and Symptoms of Listed Potential Problems Will be Absent, Minimized or Managed (Carotid Endarterectomy)  Outcome: Ongoing (interventions implemented as appropriate)   07/14/18 0126   Goal/Outcome Evaluation   Problems Assessed (Carotid Endarterectomy) all   Problems Present (Carotid Endarterect) postoperative urinary retention;situational response

## 2018-07-14 NOTE — NURSING NOTE
Patient discharge delayed due to patient required to void before leaving hospital. Order per vascular surgeon Dr. Ahn.

## 2018-07-14 NOTE — PROGRESS NOTES
Name: Bob Monahan ADMIT: 2018   : 1950  PCP: Oliverio Chowdhury Jr., MD    MRN: [id number] LOS: 1 days   AGE/SEX: 68 y.o. male  ROOM: [facility]     Active Hospital Problems    Diagnosis Date Noted   • **Stenosis of left carotid artery [I65.22] 2018   • Carotid stenosis, asymptomatic, bilateral [I65.23] 2018   • Coronary artery disease involving native coronary artery of native heart [I25.10] 2018   • History of skin cancer [Z85.828] 2016   • Chronic renal impairment, stage 3 (moderate) [N18.3] 2016   • Benign essential HTN [I10] 2016   • H/O acute myocardial infarction [I25.2] 2016   • H/O coronary angioplasty [Z98.61] 2016      Resolved Hospital Problems    Diagnosis Date Noted Date Resolved   No resolved problems to display.     Subjective     68 y.o. male s/p  Left carotid endarterectomy   No events  No neuro issues, headache  Has eaten, no nausea.     Review of Systems  As above  Objective     Vital Signs  Temp:  [97.5 °F (36.4 °C)-98.2 °F (36.8 °C)] 98.2 °F (36.8 °C)  Heart Rate:  [43-85] 63  Resp:  [14-18] 18  BP: ()/(40-84) 114/78  Arterial Line BP: ()/(43-96) 115/57    Physical Exam   Constitutional: He is oriented to person, place, and time. He appears well-developed.   Cardiovascular: Normal rate and regular rhythm.    Pulmonary/Chest: Effort normal.   Abdominal: Soft. There is no tenderness.   Neurological: He is alert and oriented to person, place, and time.     Incision is clean, dry, and intact. No hematoma    Results Review:       I reviewed the patient's new clinical results.    Results from last 7 days  Lab Units 18  0519   WBC 10*3/mm3 11.35*   HEMOGLOBIN g/dL 13.6*   PLATELETS 10*3/mm3 156     Results from last 7 days  Lab Units 18  0519   SODIUM mmol/L 136   POTASSIUM mmol/L 4.5   CHLORIDE mmol/L 102   CO2 mmol/L 22.0   BUN mg/dL 37*   CREATININE mg/dL 1.53*   GLUCOSE mg/dL 127*   Estimated Creatinine Clearance:  49.6 mL/min (A) (by C-G formula based on SCr of 1.53 mg/dL (H)).  Results from last 7 days  Lab Units 07/14/18  0519   CALCIUM mg/dL 8.5*       Assessment/Plan           Active Hospital Problems    Diagnosis Date Noted   • **Stenosis of left carotid artery [I65.22] 06/18/2018   • Carotid stenosis, asymptomatic, bilateral [I65.23] 07/13/2018   • Coronary artery disease involving native coronary artery of native heart [I25.10] 03/16/2018   • History of skin cancer [Z85.828] 12/16/2016   • Chronic renal impairment, stage 3 (moderate) [N18.3] 11/18/2016   • Benign essential HTN [I10] 02/24/2016   • H/O acute myocardial infarction [I25.2] 02/24/2016   • H/O coronary angioplasty [Z98.61] 02/24/2016      Resolved Hospital Problems    Diagnosis Date Noted Date Resolved   No resolved problems to display.        Assessment & Plan  68 y.o. male postoperative day #1 status post left carotid endarterectomy.  He is recovering well and has no specific complaints.  Blood pressure well controlled. Will discontinue the arterial line, Yu catheter, IV fluids.  Plan to mobilize today.    Creatinine is elevated, improved compared to previous labs earlier this month.    Plan for discharge later today if mobilizes well.        Avi Ahn MD  07/14/18   8:41 AM  Office Number [phone]

## 2018-07-16 LAB
ACT BLD: 125 SECONDS (ref 82–152)
ACT BLD: 208 SECONDS (ref 82–152)
ACT BLD: 252 SECONDS (ref 82–152)

## 2018-07-19 RX ORDER — BUPROPION HYDROCHLORIDE 150 MG/1
TABLET ORAL
Qty: 90 TABLET | Refills: 0 | Status: SHIPPED | OUTPATIENT
Start: 2018-07-19 | End: 2018-09-18 | Stop reason: SDUPTHER

## 2018-07-25 ENCOUNTER — OFFICE VISIT (OUTPATIENT)
Dept: NEUROSURGERY | Facility: CLINIC | Age: 68
End: 2018-07-25

## 2018-07-25 VITALS
RESPIRATION RATE: 16 BRPM | HEIGHT: 66 IN | HEART RATE: 80 BPM | DIASTOLIC BLOOD PRESSURE: 70 MMHG | SYSTOLIC BLOOD PRESSURE: 116 MMHG | WEIGHT: 147 LBS | BODY MASS INDEX: 23.63 KG/M2

## 2018-07-25 DIAGNOSIS — R90.89 ABNORMAL CT OF BRAIN: Primary | ICD-10-CM

## 2018-07-25 PROCEDURE — 99204 OFFICE O/P NEW MOD 45 MIN: CPT | Performed by: NURSE PRACTITIONER

## 2018-07-25 RX ORDER — BALSALAZIDE DISODIUM 750 MG/1
2250 CAPSULE ORAL 2 TIMES DAILY
COMMUNITY
End: 2019-06-26 | Stop reason: ALTCHOICE

## 2018-07-25 NOTE — PROGRESS NOTES
"Subjective   Patient ID: Bob Monahan is a 68 y.o. male is being seen for consultation today at the request of Tristen Kinney MD for brain imaging abnormality. He is unaccompanied - underwent recent (L) CEA by Dr. Kinney on 7-13-18.    History of Present Illness    He presents to the office today at the request of Dr. 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  The patient had imaging for further evaluation of left carotid stenosis.  He is status post left CEA on July 13, 2018 and the brain mass was an incidental finding during the workup.    Today, he denies any headaches, dizziness or lightheadedness.  He is doing well following the recent surgery.  He remains off work but is hopeful return on Monday.  He denies any balance or gait issues as well as any sensory or motor deficits.  He also denies any vision changes.    He presents unaccompanied.      /70 (BP Location: Right arm, Patient Position: Sitting, Cuff Size: Adult) Comment (BP Location): cannot assess BP in (L) at all  Pulse 80 Comment: on (R) - cannot palpate pulse in (L)  Resp 16   Ht 167.6 cm (66\")   Wt 66.7 kg (147 lb)   BMI 23.73 kg/m²     The following portions of the patient's history were reviewed and updated as appropriate: allergies, current medications, past family history, past medical history, past social history, past surgical history and problem list.    Review of Systems   Constitutional: Negative for fever.   HENT: Negative for trouble swallowing.    Eyes: Negative for visual disturbance.   Respiratory: Negative for cough and wheezing.    Cardiovascular: Negative for chest pain and palpitations.   Gastrointestinal: Negative for abdominal pain.   Genitourinary: Negative for difficulty urinating.   Musculoskeletal: Negative for gait problem.   Skin: Positive for wound (healing (L) CEA incision).   Neurological: Negative for dizziness, speech difficulty, light-headedness and headaches. "   Psychiatric/Behavioral: Negative for decreased concentration.       Objective   Physical Exam   Constitutional: He is oriented to person, place, and time. Vital signs are normal. He appears well-developed and well-nourished. He is cooperative.   Very pleasant, well-appearing, short statured male   HENT:   Head: Normocephalic and atraumatic.   Well-healing left sided endarterectomy incision site   Eyes: Pupils are equal, round, and reactive to light. EOM are normal.   Neck: Neck supple. No tracheal deviation present.   Cardiovascular: Intact distal pulses.    Pulmonary/Chest: Effort normal and breath sounds normal.   Abdominal: Soft.   Musculoskeletal: Normal range of motion.   Moving all extremities well   Neurological: He is alert and oriented to person, place, and time. He has normal strength. He displays normal reflexes. No cranial nerve deficit or sensory deficit. He exhibits normal muscle tone. Coordination and gait normal. GCS eye subscore is 4. GCS verbal subscore is 5. GCS motor subscore is 6.   Reflex Scores:       Tricep reflexes are 2+ on the right side and 2+ on the left side.       Bicep reflexes are 2+ on the right side and 2+ on the left side.       Brachioradialis reflexes are 2+ on the right side and 2+ on the left side.       Patellar reflexes are 2+ on the right side and 2+ on the left side.       Achilles reflexes are 2+ on the right side and 2+ on the left side.  Gait is stable and upright  Able to heel and toe walk, able to tandem  Cranial nerves II through XII grossly intact  Normal reflexes throughout  Finger to nose intact, negative drift and negative tremors   Skin: Skin is warm and dry.   Psychiatric: He has a normal mood and affect. His behavior is normal. Thought content normal.   Vitals reviewed.    Neurologic Exam     Mental Status   Oriented to person, place, and time.     Cranial Nerves     CN III, IV, VI   Pupils are equal, round, and reactive to light.  Extraocular motions are  normal.     Motor Exam     Strength   Strength 5/5 throughout.     Gait, Coordination, and Reflexes     Reflexes   Right brachioradialis: 2+  Left brachioradialis: 2+  Right biceps: 2+  Left biceps: 2+  Right triceps: 2+  Left triceps: 2+  Right patellar: 2+  Left patellar: 2+  Right achilles: 2+  Left achilles: 2+      Assessment/Plan   Independent Review of Radiographic Studies:    [facility] head and neck from [facility] dated June 1, 2018 revealed an incidental finding of a right CP angle mass that measures 3 x 2 x 3 cm with mild mass effect on the right lateral mayra.     Medical Decision Making:    He presents the office today for an incidental finding of a right mayra/CP angle mass seen on CTA head.  This was ordered during a workup for carotid stenosis.  Exam as noted above, no red flags.  The mass measures 3 x 2 x 3 cm.  The patient denies any headaches, dizziness and lightheadedness or any other neuro deficits at this time.  I ordered an MRI of his brain with and without contrast to further evaluate the right CP angle mass.  We will have him return to the office seen Dr. Manriquez or Dr. Berkowitz once imaging is complete.    Plan: MRI brain, return to office following    Bob was seen today for abnormal brain imaging.    Diagnoses and all orders for this visit:    Abnormal CT of brain      Return for Next scheduled follow up.

## 2018-07-26 ENCOUNTER — HOSPITAL ENCOUNTER (OUTPATIENT)
Dept: MRI IMAGING | Facility: HOSPITAL | Age: 68
Discharge: HOME | End: 2018-07-26
Admitting: NURSE PRACTITIONER

## 2018-07-26 DIAGNOSIS — R90.89 ABNORMAL CT OF BRAIN: ICD-10-CM

## 2018-07-26 PROCEDURE — 82565 ASSAY OF CREATININE: CPT

## 2018-07-26 PROCEDURE — 0 GADOBENATE DIMEGLUMINE 529 MG/ML SOLUTION: Performed by: NURSE PRACTITIONER

## 2018-07-26 PROCEDURE — A9577 INJ MULTIHANCE: HCPCS | Performed by: NURSE PRACTITIONER

## 2018-07-26 PROCEDURE — 70553 MRI BRAIN STEM W/O & W/DYE: CPT

## 2018-07-26 RX ADMIN — GADOBENATE DIMEGLUMINE 13 ML: 529 INJECTION, SOLUTION INTRAVENOUS at 13:46

## 2018-07-27 LAB — CREAT BLDA-MCNC: 1.5 MG/DL (ref 0.6–1.3)

## 2018-08-09 ENCOUNTER — OFFICE VISIT (OUTPATIENT)
Dept: NEUROSURGERY | Facility: CLINIC | Age: 68
End: 2018-08-09

## 2018-08-09 VITALS
WEIGHT: 153 LBS | RESPIRATION RATE: 16 BRPM | HEART RATE: 76 BPM | BODY MASS INDEX: 24.69 KG/M2 | DIASTOLIC BLOOD PRESSURE: 78 MMHG | SYSTOLIC BLOOD PRESSURE: 128 MMHG

## 2018-08-09 DIAGNOSIS — R90.89 ABNORMAL CT OF BRAIN: Primary | ICD-10-CM

## 2018-08-09 PROCEDURE — 99213 OFFICE O/P EST LOW 20 MIN: CPT | Performed by: NEUROLOGICAL SURGERY

## 2018-08-09 NOTE — PROGRESS NOTES
"Subjective   Patient ID: Bob Monahan is a 68 y.o. male is here today for follow-up of abnormal brain imaging and has undergone new MRI brain. He is unaccompanied.    History of Present Illness 69 yo male with incidentally noted \"mass\".  This was imaged with an MRI and is an arachnoid cyst.  No headaches or vision or hearing.  He has chronic deafness and reads lips.  No facial numbness or swallowing issues.  He has a congenital cleft palate s/p repair (21 operations).    The following portions of the patient's history were reviewed and updated as appropriate: allergies, current medications, past family history, past medical history, past social history, past surgical history and problem list.    Review of Systems   HENT: Negative for trouble swallowing.    Eyes: Negative for visual disturbance.   Musculoskeletal: Negative for gait problem.   Neurological: Negative for dizziness, speech difficulty, light-headedness and headaches.   Psychiatric/Behavioral: Negative for decreased concentration.       Objective   Physical Exam   Constitutional: He is oriented to person, place, and time.   Eyes: Pupils are equal, round, and reactive to light. EOM are normal.   Neurological: He is oriented to person, place, and time. Gait normal.     Neurologic Exam     Mental Status   Oriented to person, place, and time.     Cranial Nerves     CN II   Visual fields full to confrontation.     CN III, IV, VI   Pupils are equal, round, and reactive to light.  Extraocular motions are normal.     CN V   Facial sensation intact.     CN VII   Facial expression full, symmetric.     CN VIII   CN VIII normal.     CN IX, X   CN IX normal.   CN X normal.     CN XI   CN XI normal.     CN XII   CN XII normal.     Motor Exam     Strength   Right deltoid: 5/5  Left deltoid: 5/5  Right biceps: 5/5  Left biceps: 5/5  Right triceps: 5/5  Left triceps: 5/5  Right wrist extension: 5/5  Left wrist extension: 5/5  Right interossei: 5/5  Left interossei: " 5/5  Right iliopsoas: 5/5  Left iliopsoas: 5/5  Right quadriceps: 5/5  Left quadriceps: 5/5  Right hamstrin/5  Left hamstrin/5  Right anterior tibial: 5/5  Left anterior tibial: 5/5  Right gastroc: 5/5  Left gastroc: 5/5    Sensory Exam   Right arm light touch: normal  Left arm light touch: normal  Right leg light touch: normal  Left leg light touch: normal  Right arm pinprick: normal  Left arm pinprick: normal  Right leg pinprick: normal  Left leg pinprick: normal    Gait, Coordination, and Reflexes     Gait  Gait: normal      Assessment/Plan   Independent Review of Radiographic Studies:    Congenital arachnoid cyst.  Nominal mayra mass effect  Medical Decision Making:  This requires no treatment.  We discussed reasons to seek further evaluation.  We will be glad to see him as needed.  I provided my card. I reviewed his imAGING WITH HIM. I spent 20 minutes with him > 50% was counseling of the imaging and mdm section.      There are no diagnoses linked to this encounter.  No Follow-up on file.

## 2018-09-18 ENCOUNTER — OFFICE VISIT (OUTPATIENT)
Dept: FAMILY MEDICINE CLINIC | Facility: CLINIC | Age: 68
End: 2018-09-18

## 2018-09-18 VITALS
BODY MASS INDEX: 24.75 KG/M2 | HEIGHT: 66 IN | TEMPERATURE: 97.8 F | OXYGEN SATURATION: 99 % | DIASTOLIC BLOOD PRESSURE: 68 MMHG | SYSTOLIC BLOOD PRESSURE: 110 MMHG | HEART RATE: 66 BPM | WEIGHT: 154 LBS | RESPIRATION RATE: 16 BRPM

## 2018-09-18 DIAGNOSIS — I35.0 NONRHEUMATIC AORTIC VALVE STENOSIS: ICD-10-CM

## 2018-09-18 DIAGNOSIS — E78.00 ELEVATED CHOLESTEROL: ICD-10-CM

## 2018-09-18 DIAGNOSIS — R79.9 ABNORMAL FINDING OF BLOOD CHEMISTRY: ICD-10-CM

## 2018-09-18 DIAGNOSIS — I25.2 H/O ACUTE MYOCARDIAL INFARCTION: ICD-10-CM

## 2018-09-18 DIAGNOSIS — Z86.79 HISTORY OF CAROTID STENOSIS: ICD-10-CM

## 2018-09-18 DIAGNOSIS — K52.9 COLITIS: ICD-10-CM

## 2018-09-18 DIAGNOSIS — R73.01 IMPAIRED FASTING GLUCOSE: ICD-10-CM

## 2018-09-18 DIAGNOSIS — I10 BENIGN ESSENTIAL HTN: ICD-10-CM

## 2018-09-18 DIAGNOSIS — N18.30 CHRONIC RENAL IMPAIRMENT, STAGE 3 (MODERATE) (HCC): ICD-10-CM

## 2018-09-18 PROCEDURE — 99214 OFFICE O/P EST MOD 30 MIN: CPT | Performed by: PHYSICIAN ASSISTANT

## 2018-09-18 PROCEDURE — G0008 ADMIN INFLUENZA VIRUS VAC: HCPCS | Performed by: PHYSICIAN ASSISTANT

## 2018-09-18 PROCEDURE — 90670 PCV13 VACCINE IM: CPT | Performed by: PHYSICIAN ASSISTANT

## 2018-09-18 PROCEDURE — 90662 IIV NO PRSV INCREASED AG IM: CPT | Performed by: PHYSICIAN ASSISTANT

## 2018-09-18 PROCEDURE — G0009 ADMIN PNEUMOCOCCAL VACCINE: HCPCS | Performed by: PHYSICIAN ASSISTANT

## 2018-09-18 RX ORDER — BUPROPION HYDROCHLORIDE 150 MG/1
150 TABLET ORAL DAILY
Qty: 90 TABLET | Refills: 3 | Status: SHIPPED | OUTPATIENT
Start: 2018-09-18 | End: 2019-06-26 | Stop reason: SDUPTHER

## 2018-09-18 RX ORDER — LOVASTATIN 40 MG/1
40 TABLET ORAL NIGHTLY
Qty: 30 TABLET | Refills: 11 | Status: SHIPPED | OUTPATIENT
Start: 2018-09-18 | End: 2018-09-19

## 2018-09-18 RX ORDER — METOPROLOL SUCCINATE 50 MG/1
50 TABLET, EXTENDED RELEASE ORAL EVERY MORNING
Qty: 90 TABLET | Refills: 3 | Status: SHIPPED | OUTPATIENT
Start: 2018-09-18 | End: 2019-06-26 | Stop reason: SDUPTHER

## 2018-09-18 RX ORDER — LOSARTAN POTASSIUM AND HYDROCHLOROTHIAZIDE 25; 100 MG/1; MG/1
1 TABLET ORAL DAILY
Qty: 90 TABLET | Refills: 1 | Status: SHIPPED | OUTPATIENT
Start: 2018-09-18 | End: 2018-09-20

## 2018-09-18 NOTE — PATIENT INSTRUCTIONS
Low glycemic index diet  Exercise 30 minutes most days of the week  Make sure you get results on any labs or tests we ordered today  We discussed medications and how to take them as prescribed  Sleep 6-8 hours each night if possible  If you have not signed up for [employer], please activate your code ASAP from your After Visit Summary today    LDL goal <100  LDL goal if heart disease <70  HDL goal >60  Triglyceride goal <150  BP goal =<130/80  Fasting glucose <100

## 2018-09-19 LAB
25(OH)D3+25(OH)D2 SERPL-MCNC: 54.9 NG/ML (ref 30–100)
ALBUMIN SERPL-MCNC: 4.7 G/DL (ref 3.5–5.2)
ALBUMIN/CREAT UR: 15.3 MG/G CREAT (ref 0–30)
ALBUMIN/GLOB SERPL: 1.9 G/DL
ALP SERPL-CCNC: 94 U/L (ref 39–117)
ALT SERPL-CCNC: 15 U/L (ref 1–41)
AST SERPL-CCNC: 21 U/L (ref 1–40)
BASOPHILS # BLD AUTO: 0.04 10*3/MM3 (ref 0–0.2)
BASOPHILS NFR BLD AUTO: 0.5 % (ref 0–1.5)
BILIRUB SERPL-MCNC: 0.4 MG/DL (ref 0.1–1.2)
BUN SERPL-MCNC: 50 MG/DL (ref 8–23)
BUN/CREAT SERPL: 23.7 (ref 7–25)
CA-I SERPL ISE-MCNC: 5.1 MG/DL (ref 4.5–5.6)
CALCIUM SERPL-MCNC: 9.5 MG/DL (ref 8.6–10.5)
CHLORIDE SERPL-SCNC: 101 MMOL/L (ref 98–107)
CHOLEST SERPL-MCNC: 168 MG/DL (ref 0–200)
CO2 SERPL-SCNC: 24.1 MMOL/L (ref 22–29)
CREAT SERPL-MCNC: 2.11 MG/DL (ref 0.76–1.27)
CREAT UR-MCNC: 70.1 MG/DL
EOSINOPHIL # BLD AUTO: 0.31 10*3/MM3 (ref 0–0.7)
EOSINOPHIL NFR BLD AUTO: 4.1 % (ref 0.3–6.2)
ERYTHROCYTE [DISTWIDTH] IN BLOOD BY AUTOMATED COUNT: 14.1 % (ref 11.5–14.5)
FOLATE SERPL-MCNC: 9.39 NG/ML (ref 4.78–24.2)
GLOBULIN SER CALC-MCNC: 2.5 GM/DL
GLUCOSE SERPL-MCNC: 88 MG/DL (ref 65–99)
HBA1C MFR BLD: 5.67 % (ref 4.8–5.6)
HCT VFR BLD AUTO: 42.7 % (ref 40.4–52.2)
HDLC SERPL-MCNC: 40 MG/DL (ref 40–60)
HGB BLD-MCNC: 14.7 G/DL (ref 13.7–17.6)
IMM GRANULOCYTES # BLD: 0.01 10*3/MM3 (ref 0–0.03)
IMM GRANULOCYTES NFR BLD: 0.1 % (ref 0–0.5)
LDLC SERPL CALC-MCNC: 78 MG/DL (ref 0–100)
LYMPHOCYTES # BLD AUTO: 2.37 10*3/MM3 (ref 0.9–4.8)
LYMPHOCYTES NFR BLD AUTO: 31 % (ref 19.6–45.3)
MCH RBC QN AUTO: 32.5 PG (ref 27–32.7)
MCHC RBC AUTO-ENTMCNC: 34.4 G/DL (ref 32.6–36.4)
MCV RBC AUTO: 94.3 FL (ref 79.8–96.2)
MICROALBUMIN UR-MCNC: 10.7 UG/ML
MONOCYTES # BLD AUTO: 0.68 10*3/MM3 (ref 0.2–1.2)
MONOCYTES NFR BLD AUTO: 8.9 % (ref 5–12)
NEUTROPHILS # BLD AUTO: 4.24 10*3/MM3 (ref 1.9–8.1)
NEUTROPHILS NFR BLD AUTO: 55.5 % (ref 42.7–76)
PHOSPHATE SERPL-MCNC: 5.7 MG/DL (ref 2.5–4.5)
PLATELET # BLD AUTO: 198 10*3/MM3 (ref 140–500)
POTASSIUM SERPL-SCNC: 4.7 MMOL/L (ref 3.5–5.2)
PROT SERPL-MCNC: 7.2 G/DL (ref 6–8.5)
PTH-INTACT SERPL-MCNC: 52 PG/ML (ref 15–65)
RBC # BLD AUTO: 4.53 10*6/MM3 (ref 4.6–6)
SODIUM SERPL-SCNC: 142 MMOL/L (ref 136–145)
T3 SERPL-MCNC: 113.6 NG/DL (ref 80–200)
T4 FREE SERPL-MCNC: 1.21 NG/DL (ref 0.93–1.7)
TRIGL SERPL-MCNC: 250 MG/DL (ref 0–150)
TSH SERPL DL<=0.005 MIU/L-ACNC: 3.3 MIU/ML (ref 0.27–4.2)
VIT B12 SERPL-MCNC: 1056 PG/ML (ref 211–946)
VLDLC SERPL CALC-MCNC: 50 MG/DL (ref 5–40)
WBC # BLD AUTO: 7.64 10*3/MM3 (ref 4.5–10.7)

## 2018-09-19 RX ORDER — ROSUVASTATIN CALCIUM 10 MG/1
10 TABLET, COATED ORAL DAILY
Qty: 90 TABLET | Refills: 3 | Status: SHIPPED | OUTPATIENT
Start: 2018-09-19 | End: 2019-06-26 | Stop reason: SDUPTHER

## 2018-09-20 RX ORDER — LOSARTAN POTASSIUM 100 MG/1
100 TABLET ORAL DAILY
Qty: 90 TABLET | Refills: 1 | Status: SHIPPED | OUTPATIENT
Start: 2018-09-20 | End: 2019-03-17 | Stop reason: SDUPTHER

## 2018-09-24 NOTE — PROGRESS NOTES
Spoke to pt, he said his appt with nephrologist in November and I wanted to check with you to see if we need to try a new office or if November is ok

## 2018-10-22 ENCOUNTER — TRANSCRIBE ORDERS (OUTPATIENT)
Dept: ADMINISTRATIVE | Facility: HOSPITAL | Age: 68
End: 2018-10-22

## 2018-10-22 DIAGNOSIS — N18.4 CHRONIC KIDNEY DISEASE, STAGE IV (SEVERE) (HCC): Primary | ICD-10-CM

## 2018-10-29 ENCOUNTER — HOSPITAL ENCOUNTER (OUTPATIENT)
Dept: ULTRASOUND IMAGING | Facility: HOSPITAL | Age: 68
Discharge: HOME | End: 2018-10-29
Attending: INTERNAL MEDICINE | Admitting: INTERNAL MEDICINE

## 2018-10-29 DIAGNOSIS — N18.4 CHRONIC KIDNEY DISEASE, STAGE IV (SEVERE) (HCC): ICD-10-CM

## 2018-10-29 PROCEDURE — 76775 US EXAM ABDO BACK WALL LIM: CPT

## 2018-12-14 RX ORDER — CLOPIDOGREL BISULFATE 75 MG/1
75 TABLET ORAL DAILY
Qty: 90 TABLET | Refills: 0 | Status: SHIPPED | OUTPATIENT
Start: 2018-12-14 | End: 2019-03-11 | Stop reason: SDUPTHER

## 2018-12-14 RX ORDER — CLOPIDOGREL BISULFATE 75 MG/1
75 TABLET ORAL DAILY
Qty: 30 TABLET | Refills: 0 | Status: SHIPPED | OUTPATIENT
Start: 2018-12-14 | End: 2018-12-14 | Stop reason: SDUPTHER

## 2018-12-19 ENCOUNTER — OFFICE VISIT (OUTPATIENT)
Dept: FAMILY MEDICINE CLINIC | Facility: CLINIC | Age: 68
End: 2018-12-19

## 2018-12-19 VITALS
WEIGHT: 155 LBS | HEART RATE: 59 BPM | SYSTOLIC BLOOD PRESSURE: 114 MMHG | BODY MASS INDEX: 24.91 KG/M2 | RESPIRATION RATE: 16 BRPM | TEMPERATURE: 98 F | DIASTOLIC BLOOD PRESSURE: 78 MMHG | HEIGHT: 66 IN | OXYGEN SATURATION: 98 %

## 2018-12-19 DIAGNOSIS — N18.9 CHRONIC KIDNEY DISEASE, UNSPECIFIED CKD STAGE: ICD-10-CM

## 2018-12-19 DIAGNOSIS — Z86.79 HISTORY OF ACQUIRED HEART DISEASE: ICD-10-CM

## 2018-12-19 DIAGNOSIS — I10 BENIGN ESSENTIAL HTN: Primary | ICD-10-CM

## 2018-12-19 DIAGNOSIS — I65.23 CAROTID STENOSIS, ASYMPTOMATIC, BILATERAL: ICD-10-CM

## 2018-12-19 DIAGNOSIS — R73.01 IMPAIRED FASTING GLUCOSE: ICD-10-CM

## 2018-12-19 PROCEDURE — G0439 PPPS, SUBSEQ VISIT: HCPCS | Performed by: PHYSICIAN ASSISTANT

## 2018-12-19 PROCEDURE — 99214 OFFICE O/P EST MOD 30 MIN: CPT | Performed by: PHYSICIAN ASSISTANT

## 2018-12-19 NOTE — PROGRESS NOTES
Subjective   Bob Monahan is a 68 y.o. male.     History of Present Illness     SOA is same and sees cardio  Last echo 7-2016  Still on plavix and 81mg ASA  Sees urologist for prostate Dr Joseph yearly and does PSA  SOA is same and sees cardio--has CAD  Last echo 7-2016  Saw urologist Oct and prostate checked    Sees vasc once a year and had left carotid endarterectomy    Sees GI--Dr Barron--will have EGD      Has CKD  I did stop his HCTZ back in Sept after   Lab Results   Component Value Date    GLUCOSE 127 (H) 07/14/2018    BUN 50 (H) 09/18/2018    CREATININE 2.11 (H) 09/18/2018    EGFRIFNONA 31 (L) 09/18/2018    EGFRIFAFRI 38 (L) 09/18/2018    BCR 23.7 09/18/2018    K 4.7 09/18/2018    CO2 24.1 09/18/2018    CALCIUM 9.5 09/18/2018    PROTENTOTREF 7.2 09/18/2018    ALBUMIN 4.70 09/18/2018    LABIL2 1.9 09/18/2018    AST 21 09/18/2018    ALT 15 09/18/2018     DR. Irving did renal u/s and neg  I did change his Mevacor to Crestor d/t LDL goal not met    He is taking Wellbutrin to stop smoking    The following portions of the patient's history were reviewed and updated as appropriate: allergies, current medications, past family history, past medical history, past social history, past surgical history and problem list.    Review of Systems   Constitutional: Negative for activity change, appetite change and unexpected weight change.   HENT: Positive for hearing loss and trouble swallowing. Negative for nosebleeds and postnasal drip.    Eyes: Negative for pain and visual disturbance.   Respiratory: Negative for chest tightness, shortness of breath and wheezing.    Cardiovascular: Negative for chest pain and palpitations.   Gastrointestinal: Negative for abdominal pain and blood in stool.   Endocrine: Negative.    Genitourinary: Negative for difficulty urinating and hematuria.   Musculoskeletal: Negative for joint swelling.   Skin: Negative for color change and rash.   Allergic/Immunologic: Negative.     Neurological: Negative for syncope and speech difficulty.   Hematological: Negative for adenopathy.   Psychiatric/Behavioral: Negative for agitation and confusion.   All other systems reviewed and are negative.      Objective   Physical Exam   Constitutional: He is oriented to person, place, and time. He appears well-developed and well-nourished. No distress.   HENT:   Head: Normocephalic and atraumatic.   Cleft palate repair scar   Eyes: Conjunctivae and EOM are normal. Pupils are equal, round, and reactive to light. Right eye exhibits no discharge. Left eye exhibits no discharge. No scleral icterus.   Neck: Normal range of motion. Neck supple. No JVD present. No tracheal deviation present. No thyromegaly present.   Cardiovascular: Normal rate, regular rhythm, intact distal pulses and normal pulses. Exam reveals no gallop.   Murmur (2/6) heard.  Decreased pulse left leg and not new    Soft bruits neck--carotid   Pulmonary/Chest: Effort normal and breath sounds normal. No respiratory distress. He has no wheezes. He has no rales.   Abdominal: Soft.   Musculoskeletal: Normal range of motion.   Lymphadenopathy:     He has no cervical adenopathy.   Neurological: He is alert and oriented to person, place, and time. He exhibits normal muscle tone. Coordination normal.   Skin: Skin is warm. No rash noted. No erythema. No pallor.   Psychiatric: He has a normal mood and affect. His behavior is normal. Judgment and thought content normal.   Nursing note and vitals reviewed.      Assessment/Plan   Problems Addressed this Visit        Cardiovascular and Mediastinum    Benign essential HTN - Primary    Carotid stenosis, asymptomatic, bilateral       Endocrine    Impaired fasting glucose      Other Visit Diagnoses     Chronic kidney disease, unspecified CKD stage        History of acquired heart disease

## 2018-12-19 NOTE — PATIENT INSTRUCTIONS
Exercising to Lose Weight  Exercising can help you to lose weight. In order to lose weight through exercise, you need to do vigorous-intensity exercise. You can tell that you are exercising with vigorous intensity if you are breathing very hard and fast and cannot hold a conversation while exercising.  Moderate-intensity exercise helps to maintain your current weight. You can tell that you are exercising at a moderate level if you have a higher heart rate and faster breathing, but you are still able to hold a conversation.  How often should I exercise?  Choose an activity that you enjoy and set realistic goals. Your health care provider can help you to make an activity plan that works for you. Exercise regularly as directed by your health care provider. This may include:  · Doing resistance training twice each week, such as:  ? Push-ups.  ? Sit-ups.  ? Lifting weights.  ? Using resistance bands.  · Doing a given intensity of exercise for a given amount of time. Choose from these options:  ? 150 minutes of moderate-intensity exercise every week.  ? 75 minutes of vigorous-intensity exercise every week.  ? A mix of moderate-intensity and vigorous-intensity exercise every week.    Children, pregnant women, people who are out of shape, people who are overweight, and older adults may need to consult a health care provider for individual recommendations. If you have any sort of medical condition, be sure to consult your health care provider before starting a new exercise program.  What are some activities that can help me to lose weight?  · Walking at a rate of at least 4.5 miles an hour.  · Jogging or running at a rate of 5 miles per hour.  · Biking at a rate of at least 10 miles per hour.  · Lap swimming.  · Roller-skating or in-line skating.  · Cross-country skiing.  · Vigorous competitive sports, such as football, basketball, and soccer.  · Jumping rope.  · Aerobic dancing.  How can I be more active in my  day-to-day activities?  · Use the stairs instead of the elevator.  · Take a walk during your lunch break.  · If you drive, park your car farther away from work or school.  · If you take public transportation, get off one stop early and walk the rest of the way.  · Make all of your phone calls while standing up and walking around.  · Get up, stretch, and walk around every 30 minutes throughout the day.  What guidelines should I follow while exercising?  · Do not exercise so much that you hurt yourself, feel dizzy, or get very short of breath.  · Consult your health care provider prior to starting a new exercise program.  · Wear comfortable clothes and shoes with good support.  · Drink plenty of water while you exercise to prevent dehydration or heat stroke. Body water is lost during exercise and must be replaced.  · Work out until you breathe faster and your heart beats faster.  This information is not intended to replace advice given to you by your health care provider. Make sure you discuss any questions you have with your health care provider.  Document Released: 01/20/2012 Document Revised: 05/25/2017 Document Reviewed: 05/21/2015  [employer] Patient Education © 2018 [employer]    Low glycemic index diet  Exercise 30 minutes most days of the week  Make sure you get results on any labs or tests we ordered today  We discussed medications and how to take them as prescribed  Sleep 6-8 hours each night if possible  If you have not signed up for [employer], please activate your code ASAP from your After Visit Summary today    LDL goal <100  LDL goal if heart disease <70  HDL goal >60  Triglyceride goal <150  BP goal =<130/80  Fasting glucose <100

## 2018-12-19 NOTE — PROGRESS NOTES
QUICK REFERENCE INFORMATION:  The ABCs of the Annual Wellness Visit    Initial Medicare Wellness Visit    HEALTH RISK ASSESSMENT    1950    Recent Hospitalizations:  Recently treated at the following:  [facility].        Current Medical Providers:  Patient Care Team:  Tiarra Yi PA-C as PCP - General (Family Medicine)  Oliverio Chowdhury Jr., MD (Inactive) as PCP - Claims Attributed  Bryce Huitron II, MD as Consulting Physician (Interventional Cardiology)  Tristen Kinney MD as Surgeon (Vascular Surgery)  Prosper Barron MD as Consulting Physician (Gastroenterology)  Félix Joseph MD as Consulting Physician (Urology)        Smoking Status:  Social History     Tobacco Use   Smoking Status Former Smoker   • Packs/day: 0.25   • Years: 25.00   • Pack years: 6.25   • Types: Cigarettes   • Last attempt to quit:    • Years since quittin.9   Smokeless Tobacco Never Used       Alcohol Consumption:  Social History     Substance and Sexual Activity   Alcohol Use Yes   • Alcohol/week: 4.2 oz   • Types: 7 Shots of liquor per week       Depression Screen:   PHQ-2/PHQ-9 Depression Screening 2018   Little interest or pleasure in doing things 0   Feeling down, depressed, or hopeless 0   Total Score 0       Health Habits and Functional and Cognitive Screening:  Functional & Cognitive Status 2018   Do you have difficulty preparing food and eating? No   Do you have difficulty bathing yourself, getting dressed or grooming yourself? No   Do you have difficulty using the toilet? No   Do you have difficulty moving around from place to place? No   Do you have trouble with steps or getting out of a bed or a chair? No   In the past year have you fallen or experienced a near fall? No   Current Diet Well Balanced Diet   Dental Exam Up to date   Eye Exam Up to date   Exercise (times per week) 5 times per week   Current Exercise Activities Include Walking   Do you need help using the  phone?  No   Are you deaf or do you have serious difficulty hearing?  Yes   Do you need help with transportation? No   Do you need help shopping? No   Do you need help preparing meals?  No   Do you need help with housework?  No   Do you need help with laundry? No   Do you need help taking your medications? No   Do you need help managing money? No   Do you ever drive or ride in a car without wearing a seat belt? No   Have you felt unusual stress, anger or loneliness in the last month? No   Who do you live with? Spouse   If you need help, do you have trouble finding someone available to you? No   Have you been bothered in the last four weeks by sexual problems? No   Do you have difficulty concentrating, remembering or making decisions? No           Does the patient have evidence of cognitive impairment? No    Asiprin use counseling: Taking ASA appropriately as indicated      Recent Lab Results:    Visual Acuity:  No exam data present    Age-appropriate Screening Schedule:  Refer to the list below for future screening recommendations based on patient's age, sex and/or medical conditions. Orders for these recommended tests are listed in the plan section. The patient has been provided with a written plan.    Health Maintenance   Topic Date Due   • TDAP/TD VACCINES (1 - Tdap) 05/05/1970   • ZOSTER VACCINE (1 of 2) 05/05/2001   • PNEUMOCOCCAL VACCINES (65+ LOW/MEDIUM RISK) (2 of 2 - PPSV23) 09/18/2019   • LIPID PANEL  09/18/2019   • COLONOSCOPY  01/27/2022   • INFLUENZA VACCINE  Completed        Subjective   History of Present Illness    Bob Monahan is a 68 y.o. male who presents for an Annual Wellness Visit.    The following portions of the patient's history were reviewed and updated as appropriate: allergies, current medications, past family history, past medical history, past social history, past surgical history and problem list.    Outpatient Medications Prior to Visit   Medication Sig Dispense Refill   •  aspirin 81 MG tablet Take 81 mg by mouth Daily.     • Aspirin-Acetaminophen-Caffeine (GOODYS EXTRA STRENGTH PO) Take 1 package by mouth As Needed.     • balsalazide (COLAZAL) 750 MG capsule Take 2,250 mg by mouth 2 (Two) Times a Day.     • buPROPion XL (WELLBUTRIN XL) 150 MG 24 hr tablet Take 1 tablet by mouth Daily. For mood 90 tablet 3   • clopidogrel (PLAVIX) 75 MG tablet TAKE 1 TABLET BY MOUTH DAILY 90 tablet 0   • isosorbide mononitrate (IMDUR) 120 MG 24 hr tablet Take 120 mg by mouth Every Morning.     • losartan (COZAAR) 100 MG tablet Take 1 tablet by mouth Daily. For BP d/t renal functions (stop Losartan HCT) 90 tablet 1   • metoprolol succinate XL (TOPROL-XL) 50 MG 24 hr tablet Take 1 tablet by mouth Every Morning. For heart and BP 90 tablet 3   • Misc Natural Products (PROSTATE HEALTH PO) Take  by mouth Daily.     • nitroglycerin (NITROSTAT) 0.4 MG SL tablet Place 0.4 mg under the tongue Every 5 (Five) Minutes As Needed.     • rosuvastatin (CRESTOR) 10 MG tablet Take 1 tablet by mouth Daily. For cholesterol and stop Mevacor 90 tablet 3     No facility-administered medications prior to visit.        Patient Active Problem List   Diagnosis   • Benign essential HTN   • Degenerative joint disease involving multiple joints   • Elevated cholesterol   • H/O acute myocardial infarction   • Chronic depression   • Hypogonadism in male   • Nonrheumatic aortic valve stenosis   • H/O coronary angioplasty   • Chronic renal impairment, stage 3 (moderate) (CMS/HCC)   • History of skin cancer   • Coronary artery disease involving native coronary artery of native heart   • Colitis   • Stenosis of left carotid artery   • Carotid stenosis, asymptomatic, bilateral   • Abnormal CT of brain   • Impaired fasting glucose       Advance Care Planning:  has an advance directive - a copy has been provided and is in file    Identification of Risk Factors:  Risk factors include: cardiovascular risk.    Review of Systems    Compared to  "one year ago, the patient feels his physical health is the same.  Compared to one year ago, the patient feels his mental health is the same.    Objective     Physical Exam   Constitutional: He is oriented to person, place, and time. He appears well-developed and well-nourished. No distress.   HENT:   Head: Normocephalic and atraumatic.   Cleft palate repair scar   Eyes: Conjunctivae and EOM are normal. Pupils are equal, round, and reactive to light. Right eye exhibits no discharge. Left eye exhibits no discharge. No scleral icterus.   Neck: Normal range of motion. Neck supple. No JVD present. No tracheal deviation present. No thyromegaly present.   Cardiovascular: Normal rate, regular rhythm, intact distal pulses and normal pulses. Exam reveals no gallop.   Murmur (2/6) heard.  Decreased pulse left leg and not new    Soft bruits neck--carotid   Pulmonary/Chest: Effort normal and breath sounds normal. No respiratory distress. He has no wheezes. He has no rales.   Abdominal: Soft.   Musculoskeletal: Normal range of motion.   Lymphadenopathy:     He has no cervical adenopathy.   Neurological: He is alert and oriented to person, place, and time. He exhibits normal muscle tone. Coordination normal.   Skin: Skin is warm. No rash noted. No erythema. No pallor.   Psychiatric: He has a normal mood and affect. His behavior is normal. Judgment and thought content normal.   Nursing note and vitals reviewed.      Vitals:    12/19/18 1617   BP: 114/78   BP Location: Left arm   Patient Position: Sitting   Cuff Size: Large Adult   Pulse: 59   Resp: 16   Temp: 98 °F (36.7 °C)   TempSrc: Oral   SpO2: 98%   Weight: 70.3 kg (155 lb)   Height: 167.6 cm (66\")   PainSc: 0-No pain       Patient's Body mass index is 25.02 kg/m². BMI is above normal parameters. Recommendations include: exercise counseling.      Assessment/Plan   Patient Self-Management and Personalized Health Advice  The patient has been provided with information about: " exercise and prevention of cardiac or vascular disease and preventive services including:   · consider Shingrix.    Visit Diagnoses:  No diagnosis found.    No orders of the defined types were placed in this encounter.      Outpatient Encounter Medications as of 12/19/2018   Medication Sig Dispense Refill   • aspirin 81 MG tablet Take 81 mg by mouth Daily.     • Aspirin-Acetaminophen-Caffeine (GOODYS EXTRA STRENGTH PO) Take 1 package by mouth As Needed.     • balsalazide (COLAZAL) 750 MG capsule Take 2,250 mg by mouth 2 (Two) Times a Day.     • buPROPion XL (WELLBUTRIN XL) 150 MG 24 hr tablet Take 1 tablet by mouth Daily. For mood 90 tablet 3   • clopidogrel (PLAVIX) 75 MG tablet TAKE 1 TABLET BY MOUTH DAILY 90 tablet 0   • isosorbide mononitrate (IMDUR) 120 MG 24 hr tablet Take 120 mg by mouth Every Morning.     • losartan (COZAAR) 100 MG tablet Take 1 tablet by mouth Daily. For BP d/t renal functions (stop Losartan HCT) 90 tablet 1   • metoprolol succinate XL (TOPROL-XL) 50 MG 24 hr tablet Take 1 tablet by mouth Every Morning. For heart and BP 90 tablet 3   • Misc Natural Products (PROSTATE HEALTH PO) Take  by mouth Daily.     • nitroglycerin (NITROSTAT) 0.4 MG SL tablet Place 0.4 mg under the tongue Every 5 (Five) Minutes As Needed.     • rosuvastatin (CRESTOR) 10 MG tablet Take 1 tablet by mouth Daily. For cholesterol and stop Mevacor 90 tablet 3     No facility-administered encounter medications on file as of 12/19/2018.        Reviewed use of high risk medication in the elderly: yes  Reviewed for potential of harmful drug interactions in the elderly: yes    Follow Up:  No Follow-up on file.     An After Visit Summary and PPPS with all of these plans were given to the patient.

## 2019-03-11 RX ORDER — CLOPIDOGREL BISULFATE 75 MG/1
75 TABLET ORAL DAILY
Qty: 90 TABLET | Refills: 0 | Status: SHIPPED | OUTPATIENT
Start: 2019-03-11 | End: 2019-06-09 | Stop reason: SDUPTHER

## 2019-03-17 RX ORDER — LOSARTAN POTASSIUM 100 MG/1
TABLET ORAL
Qty: 90 TABLET | Refills: 0 | Status: SHIPPED | OUTPATIENT
Start: 2019-03-17 | End: 2019-06-17 | Stop reason: SDUPTHER

## 2019-04-08 ENCOUNTER — LAB (OUTPATIENT)
Dept: LAB | Facility: HOSPITAL | Age: 69
End: 2019-04-08

## 2019-04-08 ENCOUNTER — TRANSCRIBE ORDERS (OUTPATIENT)
Dept: ADMINISTRATIVE | Facility: HOSPITAL | Age: 69
End: 2019-04-08

## 2019-04-08 DIAGNOSIS — E55.9 VITAMIN D DEFICIENCY: ICD-10-CM

## 2019-04-08 DIAGNOSIS — N18.4 CHRONIC KIDNEY DISEASE, STAGE IV (SEVERE) (HCC): Primary | ICD-10-CM

## 2019-04-08 DIAGNOSIS — N18.4 CHRONIC KIDNEY DISEASE, STAGE IV (SEVERE) (HCC): ICD-10-CM

## 2019-04-08 LAB
25(OH)D3 SERPL-MCNC: 35.9 NG/ML (ref 30–100)
ANION GAP SERPL CALCULATED.3IONS-SCNC: 15.1 MMOL/L
BASOPHILS # BLD AUTO: 0.04 10*3/MM3 (ref 0–0.2)
BASOPHILS NFR BLD AUTO: 0.6 % (ref 0–1.5)
BILIRUB UR QL STRIP: NEGATIVE
BUN BLD-MCNC: 33 MG/DL (ref 8–23)
BUN/CREAT SERPL: 16.6 (ref 7–25)
CALCIUM SPEC-SCNC: 9.1 MG/DL (ref 8.6–10.5)
CHLORIDE SERPL-SCNC: 104 MMOL/L (ref 98–107)
CLARITY UR: CLEAR
CO2 SERPL-SCNC: 19.9 MMOL/L (ref 22–29)
COLOR UR: YELLOW
CREAT BLD-MCNC: 1.99 MG/DL (ref 0.76–1.27)
CREAT UR-MCNC: 133.6 MG/DL
DEPRECATED RDW RBC AUTO: 45.4 FL (ref 37–54)
EOSINOPHIL # BLD AUTO: 0.14 10*3/MM3 (ref 0–0.4)
EOSINOPHIL NFR BLD AUTO: 2 % (ref 0.3–6.2)
ERYTHROCYTE [DISTWIDTH] IN BLOOD BY AUTOMATED COUNT: 13.6 % (ref 12.3–15.4)
GFR SERPL CREATININE-BSD FRML MDRD: 34 ML/MIN/1.73
GLUCOSE BLD-MCNC: 92 MG/DL (ref 65–99)
GLUCOSE UR STRIP-MCNC: NEGATIVE MG/DL
HCT VFR BLD AUTO: 43.3 % (ref 37.5–51)
HGB BLD-MCNC: 14.6 G/DL (ref 13–17.7)
HGB UR QL STRIP.AUTO: NEGATIVE
IMM GRANULOCYTES # BLD AUTO: 0.02 10*3/MM3 (ref 0–0.05)
IMM GRANULOCYTES NFR BLD AUTO: 0.3 % (ref 0–0.5)
KETONES UR QL STRIP: NEGATIVE
LEUKOCYTE ESTERASE UR QL STRIP.AUTO: NEGATIVE
LYMPHOCYTES # BLD AUTO: 1.69 10*3/MM3 (ref 0.7–3.1)
LYMPHOCYTES NFR BLD AUTO: 23.6 % (ref 19.6–45.3)
MCH RBC QN AUTO: 30.8 PG (ref 26.6–33)
MCHC RBC AUTO-ENTMCNC: 33.7 G/DL (ref 31.5–35.7)
MCV RBC AUTO: 91.4 FL (ref 79–97)
MONOCYTES # BLD AUTO: 0.79 10*3/MM3 (ref 0.1–0.9)
MONOCYTES NFR BLD AUTO: 11 % (ref 5–12)
NEUTROPHILS # BLD AUTO: 4.48 10*3/MM3 (ref 1.4–7)
NEUTROPHILS NFR BLD AUTO: 62.5 % (ref 42.7–76)
NITRITE UR QL STRIP: NEGATIVE
NRBC BLD AUTO-RTO: 0 /100 WBC (ref 0–0)
PH UR STRIP.AUTO: 6 [PH] (ref 5–8)
PLATELET # BLD AUTO: 197 10*3/MM3 (ref 140–450)
PMV BLD AUTO: 9.4 FL (ref 6–12)
POTASSIUM BLD-SCNC: 4.6 MMOL/L (ref 3.5–5.2)
PROT UR QL STRIP: ABNORMAL
PROT UR-MCNC: 27 MG/DL
PROT/CREAT UR: 202.1 MG/G CREA (ref 0–200)
RBC # BLD AUTO: 4.74 10*6/MM3 (ref 4.14–5.8)
SODIUM BLD-SCNC: 139 MMOL/L (ref 136–145)
SP GR UR STRIP: 1.02 (ref 1–1.03)
UROBILINOGEN UR QL STRIP: ABNORMAL
WBC NRBC COR # BLD: 7.16 10*3/MM3 (ref 3.4–10.8)

## 2019-04-08 PROCEDURE — 80048 BASIC METABOLIC PNL TOTAL CA: CPT

## 2019-04-08 PROCEDURE — 81003 URINALYSIS AUTO W/O SCOPE: CPT

## 2019-04-08 PROCEDURE — 36415 COLL VENOUS BLD VENIPUNCTURE: CPT

## 2019-04-08 PROCEDURE — 82570 ASSAY OF URINE CREATININE: CPT

## 2019-04-08 PROCEDURE — 85025 COMPLETE CBC W/AUTO DIFF WBC: CPT

## 2019-04-08 PROCEDURE — 82306 VITAMIN D 25 HYDROXY: CPT

## 2019-04-08 PROCEDURE — 84156 ASSAY OF PROTEIN URINE: CPT

## 2019-04-17 ENCOUNTER — TRANSCRIBE ORDERS (OUTPATIENT)
Dept: ADMINISTRATIVE | Facility: HOSPITAL | Age: 69
End: 2019-04-17

## 2019-04-17 DIAGNOSIS — I73.9 PAD (PERIPHERAL ARTERY DISEASE) (HCC): Primary | ICD-10-CM

## 2019-04-19 ENCOUNTER — LAB (OUTPATIENT)
Dept: LAB | Facility: HOSPITAL | Age: 69
End: 2019-04-19

## 2019-04-19 ENCOUNTER — TRANSCRIBE ORDERS (OUTPATIENT)
Dept: LAB | Facility: HOSPITAL | Age: 69
End: 2019-04-19

## 2019-04-19 DIAGNOSIS — N18.4 CHRONIC KIDNEY DISEASE, STAGE IV (SEVERE) (HCC): ICD-10-CM

## 2019-04-19 DIAGNOSIS — E55.9 VITAMIN D DEFICIENCY, UNSPECIFIED: ICD-10-CM

## 2019-04-19 DIAGNOSIS — N18.4 CHRONIC KIDNEY DISEASE, STAGE IV (SEVERE) (HCC): Primary | ICD-10-CM

## 2019-04-19 LAB
ANION GAP SERPL CALCULATED.3IONS-SCNC: 16.5 MMOL/L
BUN BLD-MCNC: 38 MG/DL (ref 8–23)
BUN/CREAT SERPL: 21.1 (ref 7–25)
CALCIUM SPEC-SCNC: 9 MG/DL (ref 8.6–10.5)
CHLORIDE SERPL-SCNC: 103 MMOL/L (ref 98–107)
CO2 SERPL-SCNC: 21.5 MMOL/L (ref 22–29)
CREAT BLD-MCNC: 1.8 MG/DL (ref 0.76–1.27)
GFR SERPL CREATININE-BSD FRML MDRD: 38 ML/MIN/1.73
GLUCOSE BLD-MCNC: 94 MG/DL (ref 65–99)
PHOSPHATE SERPL-MCNC: 4.4 MG/DL (ref 2.5–4.5)
POTASSIUM BLD-SCNC: 5.1 MMOL/L (ref 3.5–5.2)
SODIUM BLD-SCNC: 141 MMOL/L (ref 136–145)

## 2019-04-19 PROCEDURE — 36415 COLL VENOUS BLD VENIPUNCTURE: CPT

## 2019-04-19 PROCEDURE — 80048 BASIC METABOLIC PNL TOTAL CA: CPT

## 2019-04-19 PROCEDURE — 84100 ASSAY OF PHOSPHORUS: CPT

## 2019-06-09 RX ORDER — CLOPIDOGREL BISULFATE 75 MG/1
75 TABLET ORAL DAILY
Qty: 90 TABLET | Refills: 0 | Status: SHIPPED | OUTPATIENT
Start: 2019-06-09 | End: 2019-06-26 | Stop reason: SDUPTHER

## 2019-06-17 RX ORDER — LOSARTAN POTASSIUM 100 MG/1
TABLET ORAL
Qty: 90 TABLET | Refills: 0 | Status: SHIPPED | OUTPATIENT
Start: 2019-06-17 | End: 2019-06-26 | Stop reason: SDUPTHER

## 2019-06-26 ENCOUNTER — OFFICE VISIT (OUTPATIENT)
Dept: FAMILY MEDICINE CLINIC | Facility: CLINIC | Age: 69
End: 2019-06-26

## 2019-06-26 VITALS
BODY MASS INDEX: 25.23 KG/M2 | HEART RATE: 75 BPM | RESPIRATION RATE: 16 BRPM | SYSTOLIC BLOOD PRESSURE: 110 MMHG | WEIGHT: 157 LBS | TEMPERATURE: 97.6 F | OXYGEN SATURATION: 96 % | DIASTOLIC BLOOD PRESSURE: 72 MMHG | HEIGHT: 66 IN

## 2019-06-26 DIAGNOSIS — I65.22 STENOSIS OF LEFT CAROTID ARTERY: ICD-10-CM

## 2019-06-26 DIAGNOSIS — F32.A CHRONIC DEPRESSION: ICD-10-CM

## 2019-06-26 DIAGNOSIS — R73.01 IMPAIRED FASTING GLUCOSE: ICD-10-CM

## 2019-06-26 DIAGNOSIS — I10 BENIGN ESSENTIAL HTN: Primary | ICD-10-CM

## 2019-06-26 DIAGNOSIS — I65.23 CAROTID STENOSIS, ASYMPTOMATIC, BILATERAL: ICD-10-CM

## 2019-06-26 DIAGNOSIS — N18.30 CKD (CHRONIC KIDNEY DISEASE) STAGE 3, GFR 30-59 ML/MIN (HCC): ICD-10-CM

## 2019-06-26 PROCEDURE — 99214 OFFICE O/P EST MOD 30 MIN: CPT | Performed by: PHYSICIAN ASSISTANT

## 2019-06-26 RX ORDER — LOSARTAN POTASSIUM 100 MG/1
100 TABLET ORAL DAILY
Qty: 90 TABLET | Refills: 1 | Status: SHIPPED | OUTPATIENT
Start: 2019-06-26 | End: 2019-12-26 | Stop reason: SDUPTHER

## 2019-06-26 RX ORDER — BUPROPION HYDROCHLORIDE 150 MG/1
150 TABLET ORAL DAILY
Qty: 90 TABLET | Refills: 3 | Status: SHIPPED | OUTPATIENT
Start: 2019-06-26 | End: 2019-12-06

## 2019-06-26 RX ORDER — METOPROLOL SUCCINATE 50 MG/1
50 TABLET, EXTENDED RELEASE ORAL EVERY MORNING
Qty: 90 TABLET | Refills: 3 | Status: SHIPPED | OUTPATIENT
Start: 2019-06-26 | End: 2020-09-21 | Stop reason: SDUPTHER

## 2019-06-26 RX ORDER — CLOPIDOGREL BISULFATE 75 MG/1
75 TABLET ORAL DAILY
Qty: 90 TABLET | Refills: 3 | Status: SHIPPED | OUTPATIENT
Start: 2019-06-26 | End: 2020-09-22 | Stop reason: SDUPTHER

## 2019-06-26 RX ORDER — ROSUVASTATIN CALCIUM 10 MG/1
10 TABLET, COATED ORAL DAILY
Qty: 90 TABLET | Refills: 3 | Status: SHIPPED | OUTPATIENT
Start: 2019-06-26 | End: 2020-03-04

## 2019-06-26 RX ORDER — MESALAMINE 1.2 G/1
TABLET, DELAYED RELEASE ORAL
Refills: 1 | COMMUNITY
Start: 2019-05-16

## 2019-06-26 NOTE — PROGRESS NOTES
Subjective   Bob Monahan is a 69 y.o. male.     History of Present Illness   Still on plavix and 81mg ASA  Sees urologist for prostate Dr Joseph yearly and does PSA  DR. Irving did renal u/s and neg  Notes from GI; Dr Anderson Rojas was seen today for dysphagia and collagenous colitis.;  EGD with dliation 1-11-19 and effective  He is [place]      Sees Dr Huitron yearly  5-29-18 noted    RESULTS:   EKG reviewed by me and shows normal sinus rhythm rate 89, left ventricular hypertrophy, minor nonspecific ST-T wave changes    ASSESSMENT:    Encounter Diagnoses   Name Primary?   • CAD in native artery   • Old MI (myocardial infarction) Yes   • Presence of stent in coronary artery: 2001; 2011   • Moderate aortic stenosis   • Mixed hyperlipidemia   • Echocardiogram abnormal (2016): mod AS: EF=68%; , mod LVH; diastolic dysfunction; Aorta: 4.1cm   • Normal cardiac stress test (7-29-16): normal cardiolite ; EF=63%   • Essential hypertension   • Preop cardiovascular exam: For carotid surgery with Dr. Tristen Kinney     PLAN:    1. Medications have been reviewed and recommend to continue current medications.  2. Patient is at an increased but not prohibitive risk for planned carotid surgery with Dr. Tristen Kinney  3. The patient will followup in one year ; will repeat echocardiogram in one year    Bryce Huitron MD  5/29/2018    I want him to f/u DR Irving; I will ask for f/u Oct  Had BMP and CBC 2 mos ago; no anemia of chronic disease; creat was 1.99    Protein/Creatinine Ratio, Urine 0.0 - 200.0 mg/G Crea 202.1 Abnormally high      Still not smoking  We kept Wellbutrin after stopped smoking d/t helps mood    Cardio for Imdur  PNeumovax due 9- 2019  The following portions of the patient's history were reviewed and updated as appropriate: allergies, current medications, past family history, past medical history, past social history, past surgical history and problem list.    Review of Systems   Constitutional:  Negative for activity change, appetite change and unexpected weight change.   HENT: Positive for hearing loss. Negative for nosebleeds, postnasal drip and trouble swallowing.    Eyes: Negative for pain and visual disturbance.   Respiratory: Negative for chest tightness, shortness of breath and wheezing.    Cardiovascular: Negative for chest pain and palpitations.   Gastrointestinal: Negative for abdominal pain and blood in stool.   Endocrine: Negative.    Genitourinary: Negative for difficulty urinating and hematuria.   Musculoskeletal: Negative for joint swelling.   Skin: Negative for color change and rash.   Allergic/Immunologic: Negative.    Neurological: Negative for syncope and speech difficulty.   Hematological: Negative for adenopathy.   Psychiatric/Behavioral: Negative for agitation and confusion.   All other systems reviewed and are negative.      Objective   Physical Exam   Constitutional: He is oriented to person, place, and time. He appears well-developed and well-nourished. No distress.   HENT:   Head: Normocephalic and atraumatic.   Cleft palate repair scar   Eyes: Conjunctivae and EOM are normal. Pupils are equal, round, and reactive to light. Right eye exhibits no discharge. Left eye exhibits no discharge. No scleral icterus.   Neck: Normal range of motion. Neck supple. No JVD present. No tracheal deviation present. No thyromegaly present.   Cardiovascular: Normal rate, regular rhythm, intact distal pulses and normal pulses. Exam reveals no gallop.   Murmur (2/6) heard.  Decreased pulse left leg and not new    Soft bruits neck--carotid   Pulmonary/Chest: Effort normal and breath sounds normal. No respiratory distress. He has no wheezes. He has no rales.   Abdominal: Soft.   Musculoskeletal: Normal range of motion.   Lymphadenopathy:     He has no cervical adenopathy.   Neurological: He is alert and oriented to person, place, and time. He exhibits normal muscle tone. Coordination normal.   Skin: Skin  is warm. No rash noted. No erythema. No pallor.   Psychiatric: He has a normal mood and affect. His behavior is normal. Judgment and thought content normal.   Nursing note and vitals reviewed.      Assessment/Plan   Diagnoses and all orders for this visit:    Benign essential HTN    Impaired fasting glucose    Stenosis of left carotid artery    Chronic depression    Carotid stenosis, asymptomatic, bilateral    CKD (chronic kidney disease) stage 3, GFR 30-59 ml/min (CMS/[facility])    Other orders  -     buPROPion XL (WELLBUTRIN XL) 150 MG 24 hr tablet; Take 1 tablet by mouth Daily. For mood  -     clopidogrel (PLAVIX) 75 MG tablet; Take 1 tablet by mouth Daily.  -     losartan (COZAAR) 100 MG tablet; Take 1 tablet by mouth Daily. For BP  -     rosuvastatin (CRESTOR) 10 MG tablet; Take 1 tablet by mouth Daily. For cholesterol and stop Mevacor  -     metoprolol succinate XL (TOPROL-XL) 50 MG 24 hr tablet; Take 1 tablet by mouth Every Morning. For heart and BP    In summary, Bob Monahan, was seen today.  he was seen for  Hypertenision and is well controlled on medication, Impaired fasting glucose and will continue close lab follow up to watch for DMII, Hyperlipidemia and here to discuss treatment and Vitamin D deficiency and well controlled on medication and labs at goal >30,

## 2019-06-26 NOTE — PATIENT INSTRUCTIONS
Low glycemic index diet  Exercise 30 minutes most days of the week  Make sure you get results on any labs or tests we ordered today  We discussed medications and how to take them as prescribed  Sleep 6-8 hours each night if possible  If you have not signed up for [employer], please activate your code ASAP from your After Visit Summary today    LDL goal <100  LDL goal if heart disease <70  HDL goal >60  Triglyceride goal <150  BP goal =<130/80  Fasting glucose <100

## 2019-06-27 LAB
ALBUMIN SERPL-MCNC: 4.2 G/DL (ref 3.5–5.2)
ALBUMIN/GLOB SERPL: 1.5 G/DL
ALP SERPL-CCNC: 99 U/L (ref 39–117)
ALT SERPL-CCNC: 23 U/L (ref 1–41)
AST SERPL-CCNC: 19 U/L (ref 1–40)
BASOPHILS # BLD AUTO: 0.03 10*3/MM3 (ref 0–0.2)
BASOPHILS NFR BLD AUTO: 0.5 % (ref 0–1.5)
BILIRUB SERPL-MCNC: 0.4 MG/DL (ref 0.2–1.2)
BUN SERPL-MCNC: 37 MG/DL (ref 8–23)
BUN/CREAT SERPL: 20.8 (ref 7–25)
CALCIUM SERPL-MCNC: 9.4 MG/DL (ref 8.6–10.5)
CHLORIDE SERPL-SCNC: 104 MMOL/L (ref 98–107)
CHOLEST SERPL-MCNC: 132 MG/DL (ref 0–200)
CO2 SERPL-SCNC: 19.8 MMOL/L (ref 22–29)
CREAT SERPL-MCNC: 1.78 MG/DL (ref 0.76–1.27)
EOSINOPHIL # BLD AUTO: 0.03 10*3/MM3 (ref 0–0.4)
EOSINOPHIL NFR BLD AUTO: 0.5 % (ref 0.3–6.2)
ERYTHROCYTE [DISTWIDTH] IN BLOOD BY AUTOMATED COUNT: 13.8 % (ref 12.3–15.4)
GLOBULIN SER CALC-MCNC: 2.8 GM/DL
GLUCOSE SERPL-MCNC: 87 MG/DL (ref 65–99)
HBA1C MFR BLD: 5.6 % (ref 4.8–5.6)
HCT VFR BLD AUTO: 44.4 % (ref 37.5–51)
HDLC SERPL-MCNC: 42 MG/DL (ref 40–60)
HGB BLD-MCNC: 15.4 G/DL (ref 13–17.7)
IMM GRANULOCYTES # BLD AUTO: 0 10*3/MM3 (ref 0–0.05)
IMM GRANULOCYTES NFR BLD AUTO: 0 % (ref 0–0.5)
LDLC SERPL CALC-MCNC: 50 MG/DL (ref 0–100)
LYMPHOCYTES # BLD AUTO: 1.6 10*3/MM3 (ref 0.7–3.1)
LYMPHOCYTES NFR BLD AUTO: 25.2 % (ref 19.6–45.3)
MCH RBC QN AUTO: 33.4 PG (ref 26.6–33)
MCHC RBC AUTO-ENTMCNC: 34.7 G/DL (ref 31.5–35.7)
MCV RBC AUTO: 96.3 FL (ref 79–97)
MONOCYTES # BLD AUTO: 0.63 10*3/MM3 (ref 0.1–0.9)
MONOCYTES NFR BLD AUTO: 9.9 % (ref 5–12)
NEUTROPHILS # BLD AUTO: 4.05 10*3/MM3 (ref 1.7–7)
NEUTROPHILS NFR BLD AUTO: 63.9 % (ref 42.7–76)
PLATELET # BLD AUTO: 171 10*3/MM3 (ref 140–450)
POTASSIUM SERPL-SCNC: 4.7 MMOL/L (ref 3.5–5.2)
PROT SERPL-MCNC: 7 G/DL (ref 6–8.5)
RBC # BLD AUTO: 4.61 10*6/MM3 (ref 4.14–5.8)
SODIUM SERPL-SCNC: 138 MMOL/L (ref 136–145)
T3FREE SERPL-MCNC: 3.1 PG/ML (ref 2–4.4)
T4 FREE SERPL-MCNC: 1.11 NG/DL (ref 0.93–1.7)
TRIGL SERPL-MCNC: 201 MG/DL (ref 0–150)
TSH SERPL DL<=0.005 MIU/L-ACNC: 2.84 MIU/ML (ref 0.27–4.2)
VLDLC SERPL CALC-MCNC: 40.2 MG/DL
WBC # BLD AUTO: 6.34 10*3/MM3 (ref 3.4–10.8)

## 2019-06-27 NOTE — PROGRESS NOTES
Called patient and advised of results.   Patient verbalized understanding.  Faxed copy of labs to Dr. Irving

## 2019-07-09 ENCOUNTER — HOSPITAL ENCOUNTER (OUTPATIENT)
Dept: CARDIOLOGY | Facility: HOSPITAL | Age: 69
Discharge: HOME | End: 2019-07-09
Admitting: SURGERY

## 2019-07-09 ENCOUNTER — TRANSCRIBE ORDERS (OUTPATIENT)
Dept: ADMINISTRATIVE | Facility: HOSPITAL | Age: 69
End: 2019-07-09

## 2019-07-09 DIAGNOSIS — I73.9 PAD (PERIPHERAL ARTERY DISEASE) (HCC): Primary | ICD-10-CM

## 2019-07-09 DIAGNOSIS — I73.9 PAD (PERIPHERAL ARTERY DISEASE) (HCC): ICD-10-CM

## 2019-07-09 LAB
BH CV LOWER ARTERIAL LEFT ABI RATIO: 0.81
BH CV LOWER ARTERIAL LEFT DORSALIS PEDIS SYS MAX: 102 MMHG
BH CV LOWER ARTERIAL LEFT GREAT TOE SYS MAX: 72 MMHG
BH CV LOWER ARTERIAL LEFT HIGH THIGH SYS MAX: 116 MMHG
BH CV LOWER ARTERIAL LEFT LOW THIGH SYS MAX: 102 MMHG
BH CV LOWER ARTERIAL LEFT POPLITEAL SYS MAX: 105 MMHG
BH CV LOWER ARTERIAL LEFT POST EX ABI RATIO: 0.37
BH CV LOWER ARTERIAL LEFT POST TIBIAL SYS MAX: 104 MMHG
BH CV LOWER ARTERIAL LEFT TBI RATIO: 0.56
BH CV LOWER ARTERIAL RIGHT ABI RATIO: 1.01
BH CV LOWER ARTERIAL RIGHT DORSALIS PEDIS SYS MAX: 129 MMHG
BH CV LOWER ARTERIAL RIGHT GREAT TOE SYS MAX: 99 MMHG
BH CV LOWER ARTERIAL RIGHT HIGH THIGH SYS MAX: 135 MMHG
BH CV LOWER ARTERIAL RIGHT LOW THIGH SYS MAX: 138 MMHG
BH CV LOWER ARTERIAL RIGHT POPLITEAL SYS MAX: 114 MMHG
BH CV LOWER ARTERIAL RIGHT POST EX ABI RATIO: 0.76
BH CV LOWER ARTERIAL RIGHT POST TIBIAL SYS MAX: 124 MMHG
BH CV LOWER ARTERIAL RIGHT TBI RATIO: 0.77
UPPER ARTERIAL LEFT ARM BRACHIAL SYS MAX: 88 MMHG
UPPER ARTERIAL RIGHT ARM BRACHIAL SYS MAX: 128 MMHG

## 2019-07-09 PROCEDURE — 93924 LWR XTR VASC STDY BILAT: CPT

## 2019-10-22 ENCOUNTER — HOSPITAL ENCOUNTER (OUTPATIENT)
Dept: CARDIOLOGY | Facility: HOSPITAL | Age: 69
Discharge: HOME | End: 2019-10-22
Admitting: SURGERY

## 2019-10-22 DIAGNOSIS — I73.9 PAD (PERIPHERAL ARTERY DISEASE) (HCC): ICD-10-CM

## 2019-10-22 LAB
BH CV LOWER ARTERIAL LEFT ABI RATIO: 0.79
BH CV LOWER ARTERIAL LEFT DORSALIS PEDIS SYS MAX: 88 MMHG
BH CV LOWER ARTERIAL LEFT GREAT TOE SYS MAX: 104 MMHG
BH CV LOWER ARTERIAL LEFT HIGH THIGH SYS MAX: 95 MMHG
BH CV LOWER ARTERIAL LEFT LOW THIGH SYS MAX: 82 MMHG
BH CV LOWER ARTERIAL LEFT POPLITEAL SYS MAX: 72 MMHG
BH CV LOWER ARTERIAL LEFT POST EX ABI RATIO: 0.45
BH CV LOWER ARTERIAL LEFT POST TIBIAL SYS MAX: 73 MMHG
BH CV LOWER ARTERIAL LEFT TBI RATIO: 0.92
BH CV LOWER ARTERIAL RIGHT ABI RATIO: 1.03
BH CV LOWER ARTERIAL RIGHT DORSALIS PEDIS SYS MAX: 115 MMHG
BH CV LOWER ARTERIAL RIGHT GREAT TOE SYS MAX: 103 MMHG
BH CV LOWER ARTERIAL RIGHT HIGH THIGH SYS MAX: 148 MMHG
BH CV LOWER ARTERIAL RIGHT LOW THIGH SYS MAX: 126 MMHG
BH CV LOWER ARTERIAL RIGHT POPLITEAL SYS MAX: 105 MMHG
BH CV LOWER ARTERIAL RIGHT POST EX ABI RATIO: 0.81
BH CV LOWER ARTERIAL RIGHT POST TIBIAL SYS MAX: 112 MMHG
BH CV LOWER ARTERIAL RIGHT TBI RATIO: 0.92
UPPER ARTERIAL LEFT ARM BRACHIAL SYS MAX: 88 MMHG
UPPER ARTERIAL RIGHT ARM BRACHIAL SYS MAX: 112 MMHG

## 2019-10-22 PROCEDURE — 93924 LWR XTR VASC STDY BILAT: CPT

## 2019-11-15 ENCOUNTER — APPOINTMENT (OUTPATIENT)
Dept: GENERAL RADIOLOGY | Facility: HOSPITAL | Age: 69
End: 2019-11-15

## 2019-11-15 ENCOUNTER — HOSPITAL ENCOUNTER (EMERGENCY)
Facility: HOSPITAL | Age: 69
Discharge: HOME | End: 2019-11-15
Attending: EMERGENCY MEDICINE | Admitting: EMERGENCY MEDICINE

## 2019-11-15 VITALS
HEIGHT: 65 IN | RESPIRATION RATE: 18 BRPM | OXYGEN SATURATION: 98 % | BODY MASS INDEX: 26.13 KG/M2 | HEART RATE: 82 BPM | TEMPERATURE: 97.2 F

## 2019-11-15 DIAGNOSIS — M25.461 EFFUSION OF RIGHT KNEE JOINT: ICD-10-CM

## 2019-11-15 DIAGNOSIS — M23.91 ACUTE INTERNAL DERANGEMENT OF RIGHT KNEE: Primary | ICD-10-CM

## 2019-11-15 LAB
APPEARANCE FLD: ABNORMAL
COLOR FLD: ABNORMAL
CRYSTALS FLD MICRO: NORMAL
LYMPHOCYTES NFR FLD MANUAL: 7 %
MONOCYTES NFR FLD: 20 %
MONOS+MACROS NFR FLD: 4 %
NEUTROPHILS NFR FLD MANUAL: 69 %
RBC # FLD AUTO: ABNORMAL /MM3
WBC # FLD AUTO: 2300 /MM3

## 2019-11-15 PROCEDURE — 89051 BODY FLUID CELL COUNT: CPT | Performed by: EMERGENCY MEDICINE

## 2019-11-15 PROCEDURE — 99283 EMERGENCY DEPT VISIT LOW MDM: CPT

## 2019-11-15 PROCEDURE — 87015 SPECIMEN INFECT AGNT CONCNTJ: CPT | Performed by: EMERGENCY MEDICINE

## 2019-11-15 PROCEDURE — 87070 CULTURE OTHR SPECIMN AEROBIC: CPT | Performed by: EMERGENCY MEDICINE

## 2019-11-15 PROCEDURE — 89060 EXAM SYNOVIAL FLUID CRYSTALS: CPT | Performed by: EMERGENCY MEDICINE

## 2019-11-15 PROCEDURE — 87205 SMEAR GRAM STAIN: CPT | Performed by: EMERGENCY MEDICINE

## 2019-11-15 PROCEDURE — 73562 X-RAY EXAM OF KNEE 3: CPT

## 2019-11-15 RX ORDER — LIDOCAINE HYDROCHLORIDE AND EPINEPHRINE 10; 10 MG/ML; UG/ML
10 INJECTION, SOLUTION INFILTRATION; PERINEURAL ONCE
Status: DISCONTINUED | OUTPATIENT
Start: 2019-11-15 | End: 2019-11-15 | Stop reason: HOSPADM

## 2019-11-15 RX ORDER — TRAMADOL HYDROCHLORIDE 50 MG/1
50 TABLET ORAL EVERY 6 HOURS PRN
Qty: 15 TABLET | Refills: 0 | Status: SHIPPED | OUTPATIENT
Start: 2019-11-15 | End: 2020-03-04 | Stop reason: ALTCHOICE

## 2019-11-15 NOTE — ED TRIAGE NOTES
"Patient presents to er via private vehicle from work.  Patient states he was on a ladder and turned to see something and heard a \"pop\" from right knee.  "
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
## 2019-11-20 LAB
BACTERIA FLD CULT: NORMAL
GRAM STN SPEC: NORMAL
GRAM STN SPEC: NORMAL

## 2019-12-06 RX ORDER — BUPROPION HYDROCHLORIDE 150 MG/1
150 TABLET ORAL DAILY
Qty: 90 TABLET | Refills: 0 | Status: SHIPPED | OUTPATIENT
Start: 2019-12-06 | End: 2019-12-26 | Stop reason: SDUPTHER

## 2019-12-26 ENCOUNTER — OFFICE VISIT (OUTPATIENT)
Dept: FAMILY MEDICINE CLINIC | Facility: CLINIC | Age: 69
End: 2019-12-26

## 2019-12-26 VITALS
BODY MASS INDEX: 26.33 KG/M2 | TEMPERATURE: 98 F | RESPIRATION RATE: 18 BRPM | WEIGHT: 158 LBS | HEIGHT: 65 IN | HEART RATE: 70 BPM | SYSTOLIC BLOOD PRESSURE: 110 MMHG | OXYGEN SATURATION: 99 % | DIASTOLIC BLOOD PRESSURE: 62 MMHG

## 2019-12-26 DIAGNOSIS — I65.23 CAROTID STENOSIS, ASYMPTOMATIC, BILATERAL: ICD-10-CM

## 2019-12-26 DIAGNOSIS — I10 ESSENTIAL HYPERTENSION: ICD-10-CM

## 2019-12-26 DIAGNOSIS — E78.00 ELEVATED CHOLESTEROL: ICD-10-CM

## 2019-12-26 DIAGNOSIS — I35.0 AORTIC VALVE STENOSIS, ETIOLOGY OF CARDIAC VALVE DISEASE UNSPECIFIED: ICD-10-CM

## 2019-12-26 DIAGNOSIS — R73.01 IMPAIRED FASTING GLUCOSE: ICD-10-CM

## 2019-12-26 DIAGNOSIS — I25.10 CORONARY ARTERY DISEASE INVOLVING NATIVE CORONARY ARTERY OF NATIVE HEART WITHOUT ANGINA PECTORIS: ICD-10-CM

## 2019-12-26 DIAGNOSIS — Z23 IMMUNIZATION DUE: Primary | ICD-10-CM

## 2019-12-26 DIAGNOSIS — F41.9 ANXIETY: ICD-10-CM

## 2019-12-26 PROBLEM — R93.1 ECHOCARDIOGRAM ABNORMAL: Status: ACTIVE | Noted: 2019-11-22

## 2019-12-26 PROBLEM — Z95.5 PRESENCE OF STENT IN CORONARY ARTERY: Status: ACTIVE | Noted: 2019-12-26

## 2019-12-26 PROBLEM — N52.9 ED (ERECTILE DYSFUNCTION): Status: ACTIVE | Noted: 2019-12-26

## 2019-12-26 PROBLEM — I12.9 HYPERTENSIVE CHRONIC KIDNEY DISEASE WITH STAGE 1 THROUGH STAGE 4 CHRONIC KIDNEY DISEASE, OR UNSPECIFIED CHRONIC KIDNEY DISEASE: Status: ACTIVE | Noted: 2018-10-18

## 2019-12-26 PROBLEM — E87.20 ACIDOSIS: Status: ACTIVE | Noted: 2018-11-02

## 2019-12-26 PROBLEM — N18.4 CRI (CHRONIC RENAL INSUFFICIENCY), STAGE 4 (SEVERE) (HCC): Status: ACTIVE | Noted: 2019-11-11

## 2019-12-26 PROBLEM — Q36.9 CLEFT LIP: Status: ACTIVE | Noted: 2019-12-26

## 2019-12-26 PROBLEM — M19.90 DJD (DEGENERATIVE JOINT DISEASE): Status: ACTIVE | Noted: 2019-12-26

## 2019-12-26 PROBLEM — I25.2 OLD MI (MYOCARDIAL INFARCTION): Status: ACTIVE | Noted: 2019-12-26

## 2019-12-26 PROBLEM — R13.19 ESOPHAGEAL DYSPHAGIA: Status: ACTIVE | Noted: 2018-11-14

## 2019-12-26 PROBLEM — E78.5 HYPERLIPIDEMIA: Status: ACTIVE | Noted: 2019-12-26

## 2019-12-26 PROBLEM — R07.89 CHEST TIGHTNESS: Status: ACTIVE | Noted: 2019-11-11

## 2019-12-26 PROBLEM — K52.831 COLLAGENOUS COLITIS: Status: ACTIVE | Noted: 2019-12-26

## 2019-12-26 PROBLEM — E83.39 OTHER DISORDERS OF PHOSPHORUS METABOLISM: Status: ACTIVE | Noted: 2018-10-18

## 2019-12-26 PROBLEM — N18.9 CRI (CHRONIC RENAL INSUFFICIENCY): Status: ACTIVE | Noted: 2018-10-18

## 2019-12-26 PROBLEM — N17.9 ACUTE KIDNEY FAILURE: Status: ACTIVE | Noted: 2018-10-18

## 2019-12-26 PROCEDURE — G0008 ADMIN INFLUENZA VIRUS VAC: HCPCS | Performed by: PHYSICIAN ASSISTANT

## 2019-12-26 PROCEDURE — 90653 IIV ADJUVANT VACCINE IM: CPT | Performed by: PHYSICIAN ASSISTANT

## 2019-12-26 PROCEDURE — 99214 OFFICE O/P EST MOD 30 MIN: CPT | Performed by: PHYSICIAN ASSISTANT

## 2019-12-26 RX ORDER — LOSARTAN POTASSIUM AND HYDROCHLOROTHIAZIDE 25; 100 MG/1; MG/1
TABLET ORAL
COMMUNITY
Start: 2018-10-12 | End: 2019-12-26

## 2019-12-26 RX ORDER — CILOSTAZOL 50 MG/1
50 TABLET ORAL 2 TIMES DAILY
COMMUNITY
Start: 2019-12-16

## 2019-12-26 RX ORDER — BALSALAZIDE DISODIUM 750 MG/1
CAPSULE ORAL
COMMUNITY
Start: 2018-11-02 | End: 2020-03-04 | Stop reason: ALTCHOICE

## 2019-12-26 RX ORDER — BUPROPION HYDROCHLORIDE 150 MG/1
150 TABLET ORAL DAILY
Qty: 90 TABLET | Refills: 3 | Status: SHIPPED | OUTPATIENT
Start: 2019-12-26 | End: 2020-09-22 | Stop reason: SDUPTHER

## 2019-12-26 RX ORDER — LOSARTAN POTASSIUM 100 MG/1
100 TABLET ORAL DAILY
Qty: 90 TABLET | Refills: 1 | Status: SHIPPED | OUTPATIENT
Start: 2019-12-26 | End: 2020-03-04 | Stop reason: ALTCHOICE

## 2019-12-26 NOTE — PROGRESS NOTES
"Subjective   Bob Monahan is a 69 y.o. male.     History of Present Illness    Since the last visit, he has overall felt fairly well.  He has Essential Hypertension and well controlled on current medication, Hyperlipidemia with goals met with current Rx and CAD and remains under care of their Cardiologist for mangement.  he has been compliant with current medications have reviewed them.  The patient denies medication side effects.  Will refill medications. /62   Pulse 70   Temp 98 °F (36.7 °C) (Oral)   Resp 18   Ht 165.1 cm (65\")   Wt 71.7 kg (158 lb)   SpO2 99%   BMI 26.29 kg/m²     Results for orders placed or performed during the hospital encounter of 11/15/19   Body Fluid Culture - Body Fluid, Knee, Right   Result Value Ref Range    Body Fluid Culture No growth at 5 days     Gram Stain Rare (1+) WBCs seen     Gram Stain No organisms seen    Crystal Exam, Fluid - Synovial Fluid, Knee, Right   Result Value Ref Range    Crystals, Fluid No crystals seen    Body fluid cell count - Synovial Fluid, Knee, Right   Result Value Ref Range    Color, Fluid Red     Appearance, Fluid Turbid (A) Clear    WBC, Fluid 2,300 /mm3    RBC, Fluid 570,000 /mm3   Body fluid differential - Synovial Fluid, Knee, Right   Result Value Ref Range    Neutrophils, Fluid 69 %    Lymphocytes, Fluid 7 %    Monocytes, Fluid 20 %    Mononuclear, Fluid 4 %         He cont to see nephrologist; DR Irving  She had stopped his Mevacor and changed to Crestor and I will continue this.  He has been back to his cardiologist Dr. Huitron.  I can see then he was referred to Jayant Overton.  He is to have a heart cath to make final arrangements to have his aortic valve replaced  PLAN:    The patient has severe, symptomatic, aortic stenosis. The patient would appear to be a good candidate for transcatheter aortic valve replacement. I am somewhat concerned about the patient's noted peripheral arterial disease (managed by vascular surgery at " [facility]). The patient will be scheduled for right and left heart catheterization with possible PCI. The patient will also be referred for CT surgery evaluation. The patient will then undergo CT scans of the chest abdomen and pelvis with distal runoff. Further recommendations will be dictated by the results of these evaluations.    Thank you for allowing opportunity to assist in the care of this patient.    Jayant Overton MD    He does see Dr. Barron for GI and history of collagenous colitis  Sees Dr Kinney for vascular.  Last saw him in October and said to follow-up in 9 months for his carotid duplex and ABIs  Doing a note he has been to Dr. 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    Review of Systems   Constitutional: Negative for activity change, appetite change and unexpected weight change.   HENT: Positive for tinnitus. Negative for nosebleeds and trouble swallowing.    Eyes: Negative for pain and visual disturbance.   Respiratory: Positive for shortness of breath. Negative for chest tightness and wheezing.    Cardiovascular: Negative for chest pain and palpitations.   Gastrointestinal: Negative for abdominal pain and blood in stool.   Endocrine: Negative.    Genitourinary: Negative for difficulty urinating and hematuria.   Musculoskeletal: Negative for joint swelling.   Skin: Negative for color change and rash.   Allergic/Immunologic: Negative.    Neurological: Negative for syncope and speech difficulty.   Hematological: Negative for adenopathy.   Psychiatric/Behavioral: Negative for agitation and confusion.   All other systems reviewed and are negative.      Objective   Physical  Exam   Constitutional: He is oriented to person, place, and time. He appears well-developed and well-nourished. No distress.   HENT:   Head: Normocephalic and atraumatic.   Cleft palate repair scar   Eyes: Pupils are equal, round, and reactive to light. Conjunctivae and EOM are normal. Right eye exhibits no discharge. Left eye exhibits no discharge. No scleral icterus.   Neck: Normal range of motion. Neck supple. No JVD present. No tracheal deviation present. No thyromegaly present.   Cardiovascular: Normal rate, regular rhythm, intact distal pulses and normal pulses. Exam reveals no gallop.   Murmur (2/6) heard.  Decreased pulse left leg and not new    Soft bruits neck--carotid   Pulmonary/Chest: Effort normal and breath sounds normal. No respiratory distress. He has no wheezes. He has no rales.   Musculoskeletal: Normal range of motion.   Lymphadenopathy:     He has no cervical adenopathy.   Neurological: He is alert and oriented to person, place, and time. He exhibits normal muscle tone. Coordination normal.   Skin: Skin is warm. No rash noted. No erythema. No pallor.   Psychiatric: He has a normal mood and affect. His behavior is normal. Judgment and thought content normal.   Nursing note and vitals reviewed.      Assessment/Plan   Problems Addressed this Visit        Cardiovascular and Mediastinum    Elevated cholesterol    Coronary artery disease involving native coronary artery of native heart    Relevant Medications    cilostazol (PLETAL) 50 MG tablet    Carotid stenosis, asymptomatic, bilateral    Hypertension    Relevant Medications    losartan (COZAAR) 100 MG tablet    Aortic valve stenosis    Relevant Medications    cilostazol (PLETAL) 50 MG tablet       Endocrine    Impaired fasting glucose       Other    Anxiety      Other Visit Diagnoses     Immunization due    -  Primary    Relevant Orders    Fluad Tri 65yr (1250-7472) (Completed)          Plan, Bob Monahan, was seen today.  he was seen for HTN  and continue medication, Imparied fasting glucose and plan follow up labs, diet, and exercise and CAD and is under care of their Cardiologist for medical management.  I am watching the specialist.  I want to get notes from his nephrologist in copy of last labs  I do want a follow-up on his A1c and will see if he wants to do that with his nephrology lab  Is due to see the urologist  Dr. Alaniz could not do a heart cath and then he will follow-up also with Dr. Huitron  He does see GI for colitis history  He goes to vascular as well  I will continue his Plavix and his aspirin and to control his blood pressure and watch over his lipids and impaired glucose  CKD--DR Irving every 3 mos

## 2019-12-27 LAB
ALBUMIN SERPL-MCNC: 4.4 G/DL (ref 3.6–4.8)
ALBUMIN/GLOB SERPL: 1.8 {RATIO} (ref 1.2–2.2)
ALP SERPL-CCNC: 99 IU/L (ref 39–117)
ALT SERPL-CCNC: 28 IU/L (ref 0–44)
AST SERPL-CCNC: 20 IU/L (ref 0–40)
BILIRUB SERPL-MCNC: 0.3 MG/DL (ref 0–1.2)
BUN SERPL-MCNC: 32 MG/DL (ref 8–27)
BUN/CREAT SERPL: 19 (ref 10–24)
CALCIUM SERPL-MCNC: 9.4 MG/DL (ref 8.6–10.2)
CHLORIDE SERPL-SCNC: 105 MMOL/L (ref 96–106)
CO2 SERPL-SCNC: 20 MMOL/L (ref 20–29)
CREAT SERPL-MCNC: 1.71 MG/DL (ref 0.76–1.27)
GLOBULIN SER CALC-MCNC: 2.5 G/DL (ref 1.5–4.5)
GLUCOSE SERPL-MCNC: 85 MG/DL (ref 65–99)
HBA1C MFR BLD: 5.8 % (ref 4.8–5.6)
POTASSIUM SERPL-SCNC: 5 MMOL/L (ref 3.5–5.2)
PROT SERPL-MCNC: 6.9 G/DL (ref 6–8.5)
SODIUM SERPL-SCNC: 141 MMOL/L (ref 134–144)

## 2020-03-04 ENCOUNTER — OFFICE VISIT (OUTPATIENT)
Dept: FAMILY MEDICINE CLINIC | Facility: CLINIC | Age: 70
End: 2020-03-04

## 2020-03-04 ENCOUNTER — APPOINTMENT (OUTPATIENT)
Dept: LAB | Facility: HOSPITAL | Age: 70
End: 2020-03-04

## 2020-03-04 VITALS
DIASTOLIC BLOOD PRESSURE: 80 MMHG | HEIGHT: 65 IN | HEART RATE: 81 BPM | WEIGHT: 158 LBS | OXYGEN SATURATION: 100 % | SYSTOLIC BLOOD PRESSURE: 120 MMHG | TEMPERATURE: 97.5 F | RESPIRATION RATE: 16 BRPM | BODY MASS INDEX: 26.33 KG/M2

## 2020-03-04 DIAGNOSIS — D64.9 LOW HEMOGLOBIN: ICD-10-CM

## 2020-03-04 DIAGNOSIS — Z09 HOSPITAL DISCHARGE FOLLOW-UP: Primary | ICD-10-CM

## 2020-03-04 DIAGNOSIS — Z95.2 AORTIC VALVE REPLACED: ICD-10-CM

## 2020-03-04 DIAGNOSIS — I25.2 H/O ACUTE MYOCARDIAL INFARCTION: ICD-10-CM

## 2020-03-04 DIAGNOSIS — N18.4 CRI (CHRONIC RENAL INSUFFICIENCY), STAGE 4 (SEVERE) (HCC): ICD-10-CM

## 2020-03-04 DIAGNOSIS — Z95.1 S/P CABG (CORONARY ARTERY BYPASS GRAFT): ICD-10-CM

## 2020-03-04 PROBLEM — I50.32 CHRONIC DIASTOLIC HEART FAILURE: Status: ACTIVE | Noted: 2020-02-10

## 2020-03-04 PROBLEM — J96.01 ACUTE RESPIRATORY FAILURE WITH HYPOXIA: Status: RESOLVED | Noted: 2020-02-13 | Resolved: 2020-03-04

## 2020-03-04 PROBLEM — J96.01 ACUTE RESPIRATORY FAILURE WITH HYPOXIA (HCC): Status: ACTIVE | Noted: 2020-02-13

## 2020-03-04 LAB
ALBUMIN SERPL-MCNC: 4.1 G/DL (ref 3.5–5.2)
ALBUMIN/GLOB SERPL: 1.2 G/DL
ALP SERPL-CCNC: 127 U/L (ref 39–117)
ALT SERPL W P-5'-P-CCNC: 32 U/L (ref 1–41)
ANION GAP SERPL CALCULATED.3IONS-SCNC: 16.7 MMOL/L (ref 5–15)
AST SERPL-CCNC: 19 U/L (ref 1–40)
BASOPHILS # BLD AUTO: 0.06 10*3/MM3 (ref 0–0.2)
BASOPHILS NFR BLD AUTO: 0.9 % (ref 0–1.5)
BILIRUB SERPL-MCNC: 0.3 MG/DL (ref 0.2–1.2)
BUN BLD-MCNC: 22 MG/DL (ref 8–23)
BUN/CREAT SERPL: 15.9 (ref 7–25)
CALCIUM SPEC-SCNC: 9.5 MG/DL (ref 8.6–10.5)
CHLORIDE SERPL-SCNC: 104 MMOL/L (ref 98–107)
CO2 SERPL-SCNC: 18.3 MMOL/L (ref 22–29)
CREAT BLD-MCNC: 1.38 MG/DL (ref 0.76–1.27)
DEPRECATED RDW RBC AUTO: 48.5 FL (ref 37–54)
EOSINOPHIL # BLD AUTO: 0.02 10*3/MM3 (ref 0–0.4)
EOSINOPHIL NFR BLD AUTO: 0.3 % (ref 0.3–6.2)
ERYTHROCYTE [DISTWIDTH] IN BLOOD BY AUTOMATED COUNT: 14.6 % (ref 12.3–15.4)
GFR SERPL CREATININE-BSD FRML MDRD: 51 ML/MIN/1.73
GLOBULIN UR ELPH-MCNC: 3.5 GM/DL
GLUCOSE BLD-MCNC: 115 MG/DL (ref 65–99)
HCT VFR BLD AUTO: 34.9 % (ref 37.5–51)
HGB BLD-MCNC: 11.7 G/DL (ref 13–17.7)
IMM GRANULOCYTES # BLD AUTO: 0.03 10*3/MM3 (ref 0–0.05)
IMM GRANULOCYTES NFR BLD AUTO: 0.4 % (ref 0–0.5)
LYMPHOCYTES # BLD AUTO: 1.44 10*3/MM3 (ref 0.7–3.1)
LYMPHOCYTES NFR BLD AUTO: 20.4 % (ref 19.6–45.3)
MCH RBC QN AUTO: 30.4 PG (ref 26.6–33)
MCHC RBC AUTO-ENTMCNC: 33.5 G/DL (ref 31.5–35.7)
MCV RBC AUTO: 90.6 FL (ref 79–97)
MONOCYTES # BLD AUTO: 0.57 10*3/MM3 (ref 0.1–0.9)
MONOCYTES NFR BLD AUTO: 8.1 % (ref 5–12)
NEUTROPHILS # BLD AUTO: 4.93 10*3/MM3 (ref 1.7–7)
NEUTROPHILS NFR BLD AUTO: 69.9 % (ref 42.7–76)
NRBC BLD AUTO-RTO: 0 /100 WBC (ref 0–0.2)
PLATELET # BLD AUTO: 228 10*3/MM3 (ref 140–450)
PMV BLD AUTO: 8.7 FL (ref 6–12)
POTASSIUM BLD-SCNC: 4.7 MMOL/L (ref 3.5–5.2)
PROT SERPL-MCNC: 7.6 G/DL (ref 6–8.5)
RBC # BLD AUTO: 3.85 10*6/MM3 (ref 4.14–5.8)
SODIUM BLD-SCNC: 139 MMOL/L (ref 136–145)
WBC NRBC COR # BLD: 7.05 10*3/MM3 (ref 3.4–10.8)

## 2020-03-04 PROCEDURE — 85025 COMPLETE CBC W/AUTO DIFF WBC: CPT | Performed by: PHYSICIAN ASSISTANT

## 2020-03-04 PROCEDURE — 99214 OFFICE O/P EST MOD 30 MIN: CPT | Performed by: PHYSICIAN ASSISTANT

## 2020-03-04 PROCEDURE — 36415 COLL VENOUS BLD VENIPUNCTURE: CPT | Performed by: PHYSICIAN ASSISTANT

## 2020-03-04 PROCEDURE — 80053 COMPREHEN METABOLIC PANEL: CPT | Performed by: PHYSICIAN ASSISTANT

## 2020-03-04 RX ORDER — ROSUVASTATIN CALCIUM 20 MG/1
20 TABLET, COATED ORAL DAILY
COMMUNITY
End: 2020-09-22 | Stop reason: SDUPTHER

## 2020-03-04 NOTE — PROGRESS NOTES
Transitional Care Follow Up Visit  Subjective     Bob Monahan is a 69 y.o. male who presents for a transitional care management visit.  He is here today with his wife and he is eating and sleeping and drinking well.  He has been home from the rehab center for about a week.  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  He is to start cardiac rehab.  I am going to order follow-up labs today to follow-up on the low hemoglobin of 8.0 when he was discharged and to look at electrolytes and renal function.   Within 48 business hours after discharge our office contacted him via telephone to coordinate his care and needs.      I reviewed and discussed the details of that call along with the discharge summary, hospital problems, inpatient lab results, inpatient diagnostic studies, and consultation reports with Bob.     He is not taking Plaquenil ---has RA and declines referral to Rheumatologist    Current outpatient and discharge medications have been reconciled for the patient.  Off Amlodipine and off Imdur  Current outpatient and discharge medications have been reconciled for the patient.  Reviewed by: Tiarra Yi PA-C    Reviewed by: Tiarra Yi PA-C      No flowsheet data found.  Risk for Readmission (LACE) No data recorded    History of Present Illness   Course During Hospital Stay:      hosp notes  Formatting of this note might be different from the original.  Physician Discharge Summary  Patient Identification:  Name: Bob Monahan  Age: 69 yr/o  Sex: male  : 1950  MRN: [id number]    Admit date: 2/10/2020    Discharge date and time: 2020 12:15 PM    Admitting Physician: Thiago Gerber MD     Discharge Physician: Same    Admission Diagnoses: CAD (coronary artery disease) [I25.10]  CAD (coronary artery disease)  CAD (coronary artery disease)  CAD (coronary artery disease)  CAD (coronary artery  disease)    Hospital Problems:   Principal Problem:  CAD (coronary artery disease) (1/28/2020) POA: Yes  Active Problems:  Hyperlipidemia (12/26/2019) POA: Yes  CRI (chronic renal insufficiency), stage 4 (severe) (CMS/HCC) (11/11/2019) POA: Yes  Benign essential HTN (2/24/2016) POA: Yes  Chronic depression (2/24/2016) POA: Yes  Hypertensive chronic kidney disease with stage 1 through stage 4 chronic kidney disease, or unspecified chronic kidney disease (10/18/2018) POA: Yes  S/P CABG (coronary artery bypass graft) (2/10/2020) POA: Not Applicable  S/P AVR (aortic valve replacement) (2/10/2020) POA: Not Applicable  Chronic diastolic heart failure (CMS/HCC) (2/10/2020) POA: Yes  Acute respiratory failure with hypoxia (CMS/HCC) (2/13/2020) POA: No    Discharge Diagnoses:   Principal Problem:  CAD (coronary artery disease) (1/28/2020) POA: Yes  Active Problems:  Hyperlipidemia (12/26/2019) POA: Yes  CRI (chronic renal insufficiency), stage 4 (severe) (CMS/HCC) (11/11/2019) POA: Yes  Benign essential HTN (2/24/2016) POA: Yes  Chronic depression (2/24/2016) POA: Yes  Hypertensive chronic kidney disease with stage 1 through stage 4 chronic kidney disease, or unspecified chronic kidney disease (10/18/2018) POA: Yes  S/P CABG (coronary artery bypass graft) (2/10/2020) POA: Not Applicable  S/P AVR (aortic valve replacement) (2/10/2020) POA: Not Applicable  Chronic diastolic heart failure (CMS/HCC) (2/10/2020) POA: Yes  Acute respiratory failure with hypoxia (CMS/HCC) (2/13/2020) POA: No    He cont to see nephrologist; DR Irving  He does see Dr. Barron for GI and history of collagenous colitis  Sees Dr Kinney for vascular.  Last saw him in October and said to follow-up in 9 months for his carotid duplex and ABIs  Doing a note he has been to Dr. 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 Huitron       He is here today for  hospital follow-up with his wife.  I want to follow-up on his electrolytes and his hemoglobin at discharge it was 8.0.  I know he had very vascular surgeries.  I also him to follow-up on the abnormal finding on his pancreas that was first seen on CT ANTHONY of the abdomen and pelvis  Cystic pancreatic head lesion measuring  4.0 x 3.7 x 2.7 cm with resultant dilation of the main pancreatic duct within the body and tail    MRI abd    IMPRESSION:   1. There is a dominant, approximately 4 cm cystic mass centered within the head and uncinate process of the pancreas which contains thin septations. While there is a dominant cystic component, there are multiple peripheral cystic foci present. The   appearance is compatible with a cystic pancreatic neoplasm, likely a serous cystic neoplasm. However, intraductal papillary mucinous neoplasm or mucinous cystic neoplasm are not excluded. At a minimum, a 3 month follow-up MRI is recommended to assess for   stability of the lesion. Alternatively, a more aggressive approach would include endoscopic ultrasound with fluid sampling.  2. There is a beaded appearance of the mildly dilated pancreatic duct. Differential would include intrahepatic biliary mucinous neoplasm versus sequela of chronic pancreatitis if such history is present. This appearance should also be reassessed with the   short-term follow-up MRI.  3. No adenopathy or ascites.  4. Advanced atherosclerosis    Seen by surgeon Dr Eric Dennis----[facility]; saw him while inpatient. Plan is to f/u 2mos; will have ERCP with him as well.    Left [facility] last Tues      The following portions of the patient's history were reviewed and updated as appropriate: allergies, current medications, past family history, past medical history, past social history, past surgical history and problem list.    Review of Systems   Constitutional: Negative for activity change, appetite change and unexpected weight change.   HENT: Negative for nosebleeds  and trouble swallowing.    Eyes: Negative for pain and visual disturbance.   Respiratory: Negative for chest tightness, shortness of breath and wheezing.    Cardiovascular: Negative for chest pain and palpitations.   Gastrointestinal: Negative for abdominal pain and blood in stool.   Endocrine: Negative.    Genitourinary: Negative for difficulty urinating and hematuria.   Musculoskeletal: Negative for joint swelling.   Skin: Negative for color change and rash.   Allergic/Immunologic: Negative.    Neurological: Negative for syncope and speech difficulty.   Hematological: Negative for adenopathy.   Psychiatric/Behavioral: Negative for agitation and confusion.   All other systems reviewed and are negative.      Objective   Physical Exam   Constitutional: He is oriented to person, place, and time. He appears well-developed and well-nourished. No distress.   HENT:   Head: Normocephalic and atraumatic.   Eyes: Pupils are equal, round, and reactive to light. Conjunctivae and EOM are normal. Right eye exhibits no discharge. Left eye exhibits no discharge. No scleral icterus.   Neck: Normal range of motion. Neck supple. No tracheal deviation present. No thyromegaly present.   Cardiovascular: Normal rate, regular rhythm, intact distal pulses and normal pulses. Exam reveals no gallop.   Murmur heard.  Decreased pulse left dorsalis pedis and posterior tibia--- this is chronic  Trace pedal edema = bilat     Pulmonary/Chest: Effort normal and breath sounds normal. No respiratory distress. He has no wheezes. He has no rales.   Musculoskeletal: Normal range of motion.   Neurological: He is alert and oriented to person, place, and time. He exhibits normal muscle tone. Coordination normal.   Skin: Skin is warm. No rash noted. No erythema. No pallor.   Psychiatric: He has a normal mood and affect. His behavior is normal. Judgment and thought content normal.   Nursing note and vitals reviewed.      Assessment/Plan   Bob was seen  today for hospital follow up.    Diagnoses and all orders for this visit:    Hospital discharge follow-up  -     Comprehensive Metabolic Panel  -     CBC & Differential  -     CBC Auto Differential    CRI (chronic renal insufficiency), stage 4 (severe) (CMS/HCC)  -     Comprehensive Metabolic Panel  -     CBC & Differential  -     CBC Auto Differential    H/O acute myocardial infarction  -     Comprehensive Metabolic Panel  -     CBC & Differential  -     CBC Auto Differential    Aortic valve replaced  -     Comprehensive Metabolic Panel  -     CBC & Differential  -     CBC Auto Differential    S/P CABG (coronary artery bypass graft)  -     Comprehensive Metabolic Panel  -     CBC & Differential  -     CBC Auto Differential    Low hemoglobin  -     Comprehensive Metabolic Panel  -     CBC & Differential  -     CBC Auto Differential       Will start cardiac rehab  I do want a note he is off Imdur, valsartan, amlodipine and went over his meds and reconciled everything  I do want to get some follow-up labs to follow-up from from being inpatient with that last hemoglobin 8.0 and check his electrolytes and renal functions  He will continue to be followed by cardiology cardiothoracic surgery and his nephrologist  I am pleased that he has an appointment with that specialty surgeon Dr. Dennis to follow-up on his pancreas and does have MRI scheduled with ERCP

## 2020-03-13 RX ORDER — LOSARTAN POTASSIUM 100 MG/1
TABLET ORAL
Qty: 90 TABLET | Refills: 1 | OUTPATIENT
Start: 2020-03-13

## 2020-09-21 RX ORDER — METOPROLOL SUCCINATE 50 MG/1
50 TABLET, EXTENDED RELEASE ORAL EVERY MORNING
Qty: 90 TABLET | Refills: 0 | Status: SHIPPED | OUTPATIENT
Start: 2020-09-21 | End: 2020-09-22 | Stop reason: SDUPTHER

## 2020-09-22 ENCOUNTER — OFFICE VISIT (OUTPATIENT)
Dept: FAMILY MEDICINE CLINIC | Facility: CLINIC | Age: 70
End: 2020-09-22

## 2020-09-22 VITALS
HEIGHT: 65 IN | SYSTOLIC BLOOD PRESSURE: 116 MMHG | BODY MASS INDEX: 26.33 KG/M2 | DIASTOLIC BLOOD PRESSURE: 68 MMHG | OXYGEN SATURATION: 97 % | HEART RATE: 81 BPM | TEMPERATURE: 97.3 F | RESPIRATION RATE: 16 BRPM | WEIGHT: 158 LBS

## 2020-09-22 DIAGNOSIS — I25.10 CAD IN NATIVE ARTERY: ICD-10-CM

## 2020-09-22 DIAGNOSIS — N18.30 CHRONIC RENAL INSUFFICIENCY, STAGE III (MODERATE) (HCC): ICD-10-CM

## 2020-09-22 DIAGNOSIS — R73.01 IMPAIRED FASTING GLUCOSE: Primary | ICD-10-CM

## 2020-09-22 DIAGNOSIS — F41.9 ANXIETY: ICD-10-CM

## 2020-09-22 DIAGNOSIS — I10 ESSENTIAL HYPERTENSION: ICD-10-CM

## 2020-09-22 DIAGNOSIS — I65.23 CAROTID STENOSIS, ASYMPTOMATIC, BILATERAL: ICD-10-CM

## 2020-09-22 DIAGNOSIS — E78.2 HYPERLIPIDEMIA, MIXED: ICD-10-CM

## 2020-09-22 DIAGNOSIS — I10 BENIGN ESSENTIAL HTN: ICD-10-CM

## 2020-09-22 PROBLEM — R10.9 ABDOMINAL PAIN: Status: ACTIVE | Noted: 2020-07-18

## 2020-09-22 PROBLEM — R13.19 ESOPHAGEAL DYSPHAGIA: Status: ACTIVE | Noted: 2018-11-14

## 2020-09-22 PROBLEM — K86.89 PANCREATIC MASS: Status: ACTIVE | Noted: 2020-06-22

## 2020-09-22 PROCEDURE — G0009 ADMIN PNEUMOCOCCAL VACCINE: HCPCS | Performed by: PHYSICIAN ASSISTANT

## 2020-09-22 PROCEDURE — 90694 VACC AIIV4 NO PRSRV 0.5ML IM: CPT | Performed by: PHYSICIAN ASSISTANT

## 2020-09-22 PROCEDURE — G0008 ADMIN INFLUENZA VIRUS VAC: HCPCS | Performed by: PHYSICIAN ASSISTANT

## 2020-09-22 PROCEDURE — 99214 OFFICE O/P EST MOD 30 MIN: CPT | Performed by: PHYSICIAN ASSISTANT

## 2020-09-22 PROCEDURE — 90732 PPSV23 VACC 2 YRS+ SUBQ/IM: CPT | Performed by: PHYSICIAN ASSISTANT

## 2020-09-22 RX ORDER — BUPROPION HYDROCHLORIDE 150 MG/1
150 TABLET ORAL DAILY
Qty: 90 TABLET | Refills: 3 | Status: SHIPPED | OUTPATIENT
Start: 2020-09-22 | End: 2021-11-12 | Stop reason: SDUPTHER

## 2020-09-22 RX ORDER — BLOOD-GLUCOSE METER
EACH MISCELLANEOUS SEE ADMIN INSTRUCTIONS
COMMUNITY
Start: 2020-08-04

## 2020-09-22 RX ORDER — AMLODIPINE BESYLATE 5 MG/1
5 TABLET ORAL DAILY
COMMUNITY
End: 2020-09-22 | Stop reason: SDUPTHER

## 2020-09-22 RX ORDER — METOPROLOL SUCCINATE 50 MG/1
50 TABLET, EXTENDED RELEASE ORAL EVERY MORNING
Qty: 90 TABLET | Refills: 3 | Status: SHIPPED | OUTPATIENT
Start: 2020-09-22 | End: 2021-08-03

## 2020-09-22 RX ORDER — ROSUVASTATIN CALCIUM 20 MG/1
20 TABLET, COATED ORAL DAILY
Qty: 90 TABLET | Refills: 3 | Status: SHIPPED | OUTPATIENT
Start: 2020-09-22 | End: 2022-01-20 | Stop reason: SDUPTHER

## 2020-09-22 RX ORDER — CLOPIDOGREL BISULFATE 75 MG/1
75 TABLET ORAL DAILY
Qty: 90 TABLET | Refills: 3 | Status: SHIPPED | OUTPATIENT
Start: 2020-09-22 | End: 2021-09-27

## 2020-09-22 RX ORDER — AMLODIPINE BESYLATE 5 MG/1
5 TABLET ORAL DAILY
Qty: 90 TABLET | Refills: 1 | Status: SHIPPED | OUTPATIENT
Start: 2020-09-22 | End: 2022-01-20 | Stop reason: SDUPTHER

## 2020-09-22 NOTE — PATIENT INSTRUCTIONS
"Hypertension, Adult  High blood pressure (hypertension) is when the force of blood pumping through the arteries is too strong. The arteries are the blood vessels that carry blood from the heart throughout the body. Hypertension forces the heart to work harder to pump blood and may cause arteries to become narrow or stiff. Untreated or uncontrolled hypertension can cause a heart attack, heart failure, a stroke, kidney disease, and other problems.  A blood pressure reading consists of a higher number over a lower number. Ideally, your blood pressure should be below 120/80. The first (\"top\") number is called the systolic pressure. It is a measure of the pressure in your arteries as your heart beats. The second (\"bottom\") number is called the diastolic pressure. It is a measure of the pressure in your arteries as the heart relaxes.  What are the causes?  The exact cause of this condition is not known. There are some conditions that result in or are related to high blood pressure.  What increases the risk?  Some risk factors for high blood pressure are under your control. The following factors may make you more likely to develop this condition:  · Smoking.  · Having type 2 diabetes mellitus, high cholesterol, or both.  · Not getting enough exercise or physical activity.  · Being overweight.  · Having too much fat, sugar, calories, or salt (sodium) in your diet.  · Drinking too much alcohol.  Some risk factors for high blood pressure may be difficult or impossible to change. Some of these factors include:  · Having chronic kidney disease.  · Having a family history of high blood pressure.  · Age. Risk increases with age.  · Race. You may be at higher risk if you are .  · Gender. Men are at higher risk than women before age 45. After age 65, women are at higher risk than men.  · Having obstructive sleep apnea.  · Stress.  What are the signs or symptoms?  High blood pressure may not cause symptoms. Very high " blood pressure (hypertensive crisis) may cause:  · Headache.  · Anxiety.  · Shortness of breath.  · Nosebleed.  · Nausea and vomiting.  · Vision changes.  · Severe chest pain.  · Seizures.  How is this diagnosed?  This condition is diagnosed by measuring your blood pressure while you are seated, with your arm resting on a flat surface, your legs uncrossed, and your feet flat on the floor. The cuff of the blood pressure monitor will be placed directly against the skin of your upper arm at the level of your heart. It should be measured at least twice using the same arm. Certain conditions can cause a difference in blood pressure between your right and left arms.  Certain factors can cause blood pressure readings to be lower or higher than normal for a short period of time:  · When your blood pressure is higher when you are in a health care provider's office than when you are at home, this is called white coat hypertension. Most people with this condition do not need medicines.  · When your blood pressure is higher at home than when you are in a health care provider's office, this is called masked hypertension. Most people with this condition may need medicines to control blood pressure.  If you have a high blood pressure reading during one visit or you have normal blood pressure with other risk factors, you may be asked to:  · Return on a different day to have your blood pressure checked again.  · Monitor your blood pressure at home for 1 week or longer.  If you are diagnosed with hypertension, you may have other blood or imaging tests to help your health care provider understand your overall risk for other conditions.  How is this treated?  This condition is treated by making healthy lifestyle changes, such as eating healthy foods, exercising more, and reducing your alcohol intake. Your health care provider may prescribe medicine if lifestyle changes are not enough to get your blood pressure under control, and  if:  · Your systolic blood pressure is above 130.  · Your diastolic blood pressure is above 80.  Your personal target blood pressure may vary depending on your medical conditions, your age, and other factors.  Follow these instructions at home:  Eating and drinking    · Eat a diet that is high in fiber and potassium, and low in sodium, added sugar, and fat. An example eating plan is called the DASH (Dietary Approaches to Stop Hypertension) diet. To eat this way:  ? Eat plenty of fresh fruits and vegetables. Try to fill one half of your plate at each meal with fruits and vegetables.  ? Eat whole grains, such as whole-wheat pasta, brown rice, or whole-grain bread. Fill about one fourth of your plate with whole grains.  ? Eat or drink low-fat dairy products, such as skim milk or low-fat yogurt.  ? Avoid fatty cuts of meat, processed or cured meats, and poultry with skin. Fill about one fourth of your plate with lean proteins, such as fish, chicken without skin, beans, eggs, or tofu.  ? Avoid pre-made and processed foods. These tend to be higher in sodium, added sugar, and fat.  · Reduce your daily sodium intake. Most people with hypertension should eat less than 1,500 mg of sodium a day.  · Do not drink alcohol if:  ? Your health care provider tells you not to drink.  ? You are pregnant, may be pregnant, or are planning to become pregnant.  · If you drink alcohol:  ? Limit how much you use to:  § 0-1 drink a day for women.  § 0-2 drinks a day for men.  ? Be aware of how much alcohol is in your drink. In the [place] one drink equals one 12 oz bottle of beer (355 mL), one 5 oz glass of wine (148 mL), or one 1½ oz glass of hard liquor (44 mL).  Lifestyle    · Work with your health care provider to maintain a healthy body weight or to lose weight. Ask what an ideal weight is for you.  · Get at least 30 minutes of exercise most days of the week. Activities may include walking, swimming, or biking.  · Include exercise to  strengthen your muscles (resistance exercise), such as Pilates or lifting weights, as part of your weekly exercise routine. Try to do these types of exercises for 30 minutes at least 3 days a week.  · Do not use any products that contain nicotine or tobacco, such as cigarettes, e-cigarettes, and chewing tobacco. If you need help quitting, ask your health care provider.  · Monitor your blood pressure at home as told by your health care provider.  · Keep all follow-up visits as told by your health care provider. This is important.  Medicines  · Take over-the-counter and prescription medicines only as told by your health care provider. Follow directions carefully. Blood pressure medicines must be taken as prescribed.  · Do not skip doses of blood pressure medicine. Doing this puts you at risk for problems and can make the medicine less effective.  · Ask your health care provider about side effects or reactions to medicines that you should watch for.  Contact a health care provider if you:  · Think you are having a reaction to a medicine you are taking.  · Have headaches that keep coming back (recurring).  · Feel dizzy.  · Have swelling in your ankles.  · Have trouble with your vision.  Get help right away if you:  · Develop a severe headache or confusion.  · Have unusual weakness or numbness.  · Feel faint.  · Have severe pain in your chest or abdomen.  · Vomit repeatedly.  · Have trouble breathing.  Summary  · Hypertension is when the force of blood pumping through your arteries is too strong. If this condition is not controlled, it may put you at risk for serious complications.  · Your personal target blood pressure may vary depending on your medical conditions, your age, and other factors. For most people, a normal blood pressure is less than 120/80.  · Hypertension is treated with lifestyle changes, medicines, or a combination of both. Lifestyle changes include losing weight, eating a healthy, low-sodium diet,  exercising more, and limiting alcohol.  This information is not intended to replace advice given to you by your health care provider. Make sure you discuss any questions you have with your health care provider.  Document Released: 12/18/2006 Document Revised: 08/28/2019 Document Reviewed: 08/28/2019  Elsevier Patient Education © 2020 [employer]      Dyslipidemia  Dyslipidemia is an imbalance of waxy, fat-like substances (lipids) in the blood. The body needs lipids in small amounts. Dyslipidemia often involves a high level of cholesterol or triglycerides, which are types of lipids.  Common forms of dyslipidemia include:  · High levels of LDL cholesterol. LDL is the type of cholesterol that causes fatty deposits (plaques) to build up in the blood vessels that carry blood away from your heart (arteries).  · Low levels of HDL cholesterol. HDL cholesterol is the type of cholesterol that protects against heart disease. High levels of HDL remove the LDL buildup from arteries.  · High levels of triglycerides. Triglycerides are a fatty substance in the blood that is linked to a buildup of plaques in the arteries.  What are the causes?  Primary dyslipidemia is caused by changes (mutations) in genes that are passed down through families (inherited). These mutations cause several types of dyslipidemia.  Secondary dyslipidemia is caused by lifestyle choices and diseases that lead to dyslipidemia, such as:  · Eating a diet that is high in animal fat.  · Not getting enough exercise.  · Having diabetes, kidney disease, liver disease, or thyroid disease.  · Drinking large amounts of alcohol.  · Using certain medicines.  What increases the risk?  You are more likely to develop this condition if you are an older man or if you are a woman who has gone through menopause. Other risk factors include:  · Having a family history of dyslipidemia.  · Taking certain medicines, including birth control pills, steroids, some diuretics, and  beta-blockers.  · Smoking cigarettes.  · Eating a high-fat diet.  · Having certain medical conditions such as diabetes, polycystic ovary syndrome (PCOS), kidney disease, liver disease, or hypothyroidism.  · Not exercising regularly.  · Being overweight or obese with too much belly fat.  What are the signs or symptoms?  In most cases, dyslipidemia does not usually cause any symptoms.  In severe cases, very high lipid levels can cause:  · Fatty bumps under the skin (xanthomas).  · White or gray ring around the black center (pupil) of the eye.  Very high triglyceride levels can cause inflammation of the pancreas (pancreatitis).  How is this diagnosed?  Your health care provider may diagnose dyslipidemia based on a routine blood test (fasting blood test). Because most people do not have symptoms of the condition, this blood testing (lipid profile) is done on adults age 20 and older and is repeated every 5 years. This test checks:  · Total cholesterol. This measures the total amount of cholesterol in your blood, including LDL cholesterol, HDL cholesterol, and triglycerides. A healthy number is below 200.  · LDL cholesterol. The target number for LDL cholesterol is different for each person, depending on individual risk factors. Ask your health care provider what your LDL cholesterol should be.  · HDL cholesterol. An HDL level of 60 or higher is best because it helps to protect against heart disease. A number below 40 for men or below 50 for women increases the risk for heart disease.  · Triglycerides. A healthy triglyceride number is below 150.  If your lipid profile is abnormal, your health care provider may do other blood tests.  How is this treated?  Treatment depends on the type of dyslipidemia that you have and your other risk factors for heart disease and stroke. Your health care provider will have a target range for your lipid levels based on this information.  For many people, this condition may be treated by  lifestyle changes, such as diet and exercise. Your health care provider may recommend that you:  · Get regular exercise.  · Make changes to your diet.  · Quit smoking if you smoke.  If diet changes and exercise do not help you reach your goals, your health care provider may also prescribe medicine to lower lipids. The most commonly prescribed type of medicine lowers your LDL cholesterol (statin drug). If you have a high triglyceride level, your provider may prescribe another type of drug (fibrate) or an omega-3 fish oil supplement, or both.  Follow these instructions at home:    Eating and drinking  · Follow instructions from your health care provider or dietitian about eating or drinking restrictions.  · Eat a healthy diet as told by your health care provider. This can help you reach and maintain a healthy weight, lower your LDL cholesterol, and raise your HDL cholesterol. This may include:  ? Limiting your calories, if you are overweight.  ? Eating more fruits, vegetables, whole grains, fish, and lean meats.  ? Limiting saturated fat, trans fat, and cholesterol.  · If you drink alcohol:  ? Limit how much you use.  ? Be aware of how much alcohol is in your drink. In the [place] one drink equals one 12 oz bottle of beer (355 mL), one 5 oz glass of wine (148 mL), or one 1½ oz glass of hard liquor (44 mL).  · Do not drink alcohol if:  ? Your health care provider tells you not to drink.  ? You are pregnant, may be pregnant, or are planning to become pregnant.  Activity  · Get regular exercise. Start an exercise and strength training program as told by your health care provider. Ask your health care provider what activities are safe for you. Your health care provider may recommend:  ? 30 minutes of aerobic activity 4-6 days a week. Brisk walking is an example of aerobic activity.  ? Strength training 2 days a week.  General instructions  · Do not use any products that contain nicotine or tobacco, such as cigarettes,  e-cigarettes, and chewing tobacco. If you need help quitting, ask your health care provider.  · Take over-the-counter and prescription medicines only as told by your health care provider. This includes supplements.  · Keep all follow-up visits as told by your health care provider.  Contact a health care provider if:  · You are:  ? Having trouble sticking to your exercise or diet plan.  ? Struggling to quit smoking or control your use of alcohol.  Summary  · Dyslipidemia often involves a high level of cholesterol or triglycerides, which are types of lipids.  · Treatment depends on the type of dyslipidemia that you have and your other risk factors for heart disease and stroke.  · For many people, treatment starts with lifestyle changes, such as diet and exercise.  · Your health care provider may prescribe medicine to lower lipids.  This information is not intended to replace advice given to you by your health care provider. Make sure you discuss any questions you have with your health care provider.  Document Released: 12/23/2014 Document Revised: 08/12/2019 Document Reviewed: 07/19/2019  [employer] Patient Education © 2020 [employer]

## 2020-09-22 NOTE — PROGRESS NOTES
Immunization  Immunization performed in LD by Jade De Jesus MA. Patient tolerated the procedure well without complications.  09/22/20   Jade De Jesus MA        Immunization  Immunization performed in RD by Jade De Jesus MA. Patient tolerated the procedure well without complications.  09/22/20   Jade De Jesus MA

## 2020-09-28 ENCOUNTER — LAB (OUTPATIENT)
Dept: LAB | Facility: HOSPITAL | Age: 70
End: 2020-09-28

## 2020-09-28 LAB
ALBUMIN SERPL-MCNC: 4.3 G/DL (ref 3.5–5.2)
ALBUMIN/GLOB SERPL: 1.6 G/DL
ALP SERPL-CCNC: 111 U/L (ref 39–117)
ALT SERPL W P-5'-P-CCNC: 50 U/L (ref 1–41)
ANION GAP SERPL CALCULATED.3IONS-SCNC: 10.5 MMOL/L (ref 5–15)
ANISOCYTOSIS BLD QL: NORMAL
AST SERPL-CCNC: 35 U/L (ref 1–40)
BILIRUB SERPL-MCNC: 0.5 MG/DL (ref 0–1.2)
BUN SERPL-MCNC: 31 MG/DL (ref 8–23)
BUN/CREAT SERPL: 19.3 (ref 7–25)
CALCIUM SPEC-SCNC: 9.2 MG/DL (ref 8.6–10.5)
CHLORIDE SERPL-SCNC: 104 MMOL/L (ref 98–107)
CHOLEST SERPL-MCNC: 111 MG/DL (ref 0–200)
CO2 SERPL-SCNC: 22.5 MMOL/L (ref 22–29)
CREAT SERPL-MCNC: 1.61 MG/DL (ref 0.76–1.27)
DEPRECATED RDW RBC AUTO: 50.5 FL (ref 37–54)
ERYTHROCYTE [DISTWIDTH] IN BLOOD BY AUTOMATED COUNT: 15.1 % (ref 12.3–15.4)
FOLATE SERPL-MCNC: 12 NG/ML (ref 4.78–24.2)
GFR SERPL CREATININE-BSD FRML MDRD: 43 ML/MIN/1.73
GLOBULIN UR ELPH-MCNC: 2.7 GM/DL
GLUCOSE SERPL-MCNC: 116 MG/DL (ref 65–99)
HBA1C MFR BLD: 6.01 % (ref 4.8–5.6)
HCT VFR BLD AUTO: 39.2 % (ref 37.5–51)
HDLC SERPL-MCNC: 40 MG/DL (ref 40–60)
HGB BLD-MCNC: 13.4 G/DL (ref 13–17.7)
LDLC SERPL CALC-MCNC: 38 MG/DL (ref 0–100)
LDLC/HDLC SERPL: 0.95 {RATIO}
LYMPHOCYTES # BLD MANUAL: 1.01 10*3/MM3 (ref 0.7–3.1)
LYMPHOCYTES NFR BLD MANUAL: 22 % (ref 19.6–45.3)
LYMPHOCYTES NFR BLD MANUAL: 9 % (ref 5–12)
MCH RBC QN AUTO: 31 PG (ref 26.6–33)
MCHC RBC AUTO-ENTMCNC: 34.2 G/DL (ref 31.5–35.7)
MCV RBC AUTO: 90.7 FL (ref 79–97)
MONOCYTES # BLD AUTO: 0.41 10*3/MM3 (ref 0.1–0.9)
NEUTROPHILS # BLD AUTO: 3.17 10*3/MM3 (ref 1.7–7)
NEUTROPHILS NFR BLD MANUAL: 69 % (ref 42.7–76)
PLAT MORPH BLD: NORMAL
PLATELET # BLD AUTO: 149 10*3/MM3 (ref 140–450)
PMV BLD AUTO: 9.3 FL (ref 6–12)
POLYCHROMASIA BLD QL SMEAR: NORMAL
POTASSIUM SERPL-SCNC: 4.7 MMOL/L (ref 3.5–5.2)
PROT SERPL-MCNC: 7 G/DL (ref 6–8.5)
RBC # BLD AUTO: 4.32 10*6/MM3 (ref 4.14–5.8)
SODIUM SERPL-SCNC: 137 MMOL/L (ref 136–145)
T3FREE SERPL-MCNC: 2.87 PG/ML (ref 2–4.4)
T4 FREE SERPL-MCNC: 1.04 NG/DL (ref 0.93–1.7)
TRIGL SERPL-MCNC: 166 MG/DL (ref 0–150)
TSH SERPL DL<=0.05 MIU/L-ACNC: 2.11 UIU/ML (ref 0.27–4.2)
VIT B12 BLD-MCNC: 933 PG/ML (ref 211–946)
VLDLC SERPL-MCNC: 33.2 MG/DL (ref 5–40)
WBC # BLD AUTO: 4.59 10*3/MM3 (ref 3.4–10.8)
WBC MORPH BLD: NORMAL

## 2020-09-28 PROCEDURE — 82607 VITAMIN B-12: CPT | Performed by: PHYSICIAN ASSISTANT

## 2020-09-28 PROCEDURE — 36415 COLL VENOUS BLD VENIPUNCTURE: CPT | Performed by: PHYSICIAN ASSISTANT

## 2020-09-28 PROCEDURE — 84439 ASSAY OF FREE THYROXINE: CPT | Performed by: PHYSICIAN ASSISTANT

## 2020-09-28 PROCEDURE — 84443 ASSAY THYROID STIM HORMONE: CPT | Performed by: PHYSICIAN ASSISTANT

## 2020-09-28 PROCEDURE — 83036 HEMOGLOBIN GLYCOSYLATED A1C: CPT | Performed by: PHYSICIAN ASSISTANT

## 2020-09-28 PROCEDURE — 85025 COMPLETE CBC W/AUTO DIFF WBC: CPT | Performed by: PHYSICIAN ASSISTANT

## 2020-09-28 PROCEDURE — 85007 BL SMEAR W/DIFF WBC COUNT: CPT | Performed by: PHYSICIAN ASSISTANT

## 2020-09-28 PROCEDURE — 80053 COMPREHEN METABOLIC PANEL: CPT | Performed by: PHYSICIAN ASSISTANT

## 2020-09-28 PROCEDURE — 80061 LIPID PANEL: CPT | Performed by: PHYSICIAN ASSISTANT

## 2020-09-28 PROCEDURE — 82746 ASSAY OF FOLIC ACID SERUM: CPT | Performed by: PHYSICIAN ASSISTANT

## 2020-09-28 PROCEDURE — 84481 FREE ASSAY (FT-3): CPT | Performed by: PHYSICIAN ASSISTANT

## 2020-10-08 ENCOUNTER — HOSPITAL ENCOUNTER (OUTPATIENT)
Dept: DIABETES SERVICES | Facility: HOSPITAL | Age: 70
Discharge: HOME | End: 2020-10-08
Admitting: PHYSICIAN ASSISTANT

## 2020-10-08 PROCEDURE — 97802 MEDICAL NUTRITION INDIV IN: CPT | Performed by: DIETITIAN, REGISTERED

## 2021-08-03 RX ORDER — METOPROLOL SUCCINATE 50 MG/1
TABLET, EXTENDED RELEASE ORAL
Qty: 90 TABLET | Refills: 0 | Status: SHIPPED | OUTPATIENT
Start: 2021-08-03 | End: 2022-01-20 | Stop reason: SDUPTHER

## 2021-09-27 RX ORDER — CLOPIDOGREL BISULFATE 75 MG/1
75 TABLET ORAL DAILY
Qty: 30 TABLET | Refills: 0 | Status: SHIPPED | OUTPATIENT
Start: 2021-09-27 | End: 2021-11-12 | Stop reason: SDUPTHER

## 2021-11-12 RX ORDER — CLOPIDOGREL BISULFATE 75 MG/1
75 TABLET ORAL DAILY
Qty: 30 TABLET | Refills: 0 | Status: SHIPPED | OUTPATIENT
Start: 2021-11-12 | End: 2021-12-17 | Stop reason: SDUPTHER

## 2021-11-12 RX ORDER — BUPROPION HYDROCHLORIDE 150 MG/1
150 TABLET ORAL DAILY
Qty: 30 TABLET | Refills: 0 | Status: SHIPPED | OUTPATIENT
Start: 2021-11-12 | End: 2021-11-16 | Stop reason: SDUPTHER

## 2021-11-12 NOTE — TELEPHONE ENCOUNTER
Caller: Bob Monahan    Relationship: Self    Best call back number:     Requested Prescriptions:   Requested Prescriptions     Pending Prescriptions Disp Refills   • buPROPion XL (WELLBUTRIN XL) 150 MG 24 hr tablet 90 tablet 3     Sig: Take 1 tablet by mouth Daily. For mood   • clopidogrel (PLAVIX) 75 MG tablet 30 tablet 0     Sig: Take 1 tablet by mouth Daily.        Pharmacy where request should be sent: [facility] #[id number] [facility] [address] [place] [address] [address]RL AT [facility] [phone] [facility] [phone] FX         Does the patient have less than a 3 day supply:  [x] Yes  [] No    Adelita Clifford Rep   11/12/21 10:06 EST

## 2021-11-16 ENCOUNTER — TELEPHONE (OUTPATIENT)
Dept: FAMILY MEDICINE CLINIC | Facility: CLINIC | Age: 71
End: 2021-11-16

## 2021-11-16 RX ORDER — BUPROPION HYDROCHLORIDE 150 MG/1
150 TABLET ORAL DAILY
Qty: 30 TABLET | Refills: 0 | Status: SHIPPED | OUTPATIENT
Start: 2021-11-16 | End: 2022-01-20

## 2021-11-16 NOTE — TELEPHONE ENCOUNTER
PATIENT STATES THAT:  [id number] - [place], [place] - [address] AT [facility] - [phone]  - [phone] FX     DID NOT RECEIVE THE PRESCRIPTION:  buPROPion XL (WELLBUTRIN XL) 150 MG 24 hr tablet [[id number]] (Order [id number])    PLEASE REFILL    PATIENT PHONE #: [phone]

## 2021-11-16 NOTE — TELEPHONE ENCOUNTER
I will only send a 30-day supply.  His last appointment with me was the end of September 2020------------- needs to see me at least once a year

## 2021-12-17 ENCOUNTER — TELEPHONE (OUTPATIENT)
Dept: FAMILY MEDICINE CLINIC | Facility: CLINIC | Age: 71
End: 2021-12-17

## 2021-12-17 RX ORDER — CLOPIDOGREL BISULFATE 75 MG/1
75 TABLET ORAL DAILY
Qty: 30 TABLET | Refills: 0 | Status: SHIPPED | OUTPATIENT
Start: 2021-12-17 | End: 2021-12-17

## 2021-12-17 RX ORDER — CLOPIDOGREL BISULFATE 75 MG/1
75 TABLET ORAL DAILY
Qty: 90 TABLET | Refills: 0 | Status: SHIPPED | OUTPATIENT
Start: 2021-12-17 | End: 2022-01-20 | Stop reason: SDUPTHER

## 2021-12-17 NOTE — TELEPHONE ENCOUNTER
Caller: Bob Monahan    Relationship: Self    Best call back number: [phone]    Requested Prescriptions:   Requested Prescriptions     Pending Prescriptions Disp Refills   • clopidogrel (PLAVIX) 75 MG tablet 30 tablet 0     Sig: Take 1 tablet by mouth Daily.        Pharmacy where request should be sent:  [facility] #[id number] [facility] [address] [facility] [address] AT [facility] - [phone]  - [phone]   [phone]    Additional details provided by patient:PATIENT HAS 1 TABLET LEFT, HAS AN APPOINTMENT ON 1/20/22     Does the patient have less than a 3 day supply:  [x] Yes  [] No    Adelita Oneil Rep   12/17/21 11:40 EST

## 2022-01-01 ENCOUNTER — TELEPHONE (OUTPATIENT)
Dept: FAMILY MEDICINE CLINIC | Facility: CLINIC | Age: 72
End: 2022-01-01

## 2022-01-20 ENCOUNTER — OFFICE VISIT (OUTPATIENT)
Dept: FAMILY MEDICINE CLINIC | Facility: CLINIC | Age: 72
End: 2022-01-20

## 2022-01-20 VITALS
SYSTOLIC BLOOD PRESSURE: 110 MMHG | RESPIRATION RATE: 16 BRPM | OXYGEN SATURATION: 97 % | HEIGHT: 65 IN | DIASTOLIC BLOOD PRESSURE: 62 MMHG | HEART RATE: 78 BPM | BODY MASS INDEX: 25.86 KG/M2 | WEIGHT: 155.2 LBS | TEMPERATURE: 97.5 F

## 2022-01-20 DIAGNOSIS — I25.10 CORONARY ARTERY DISEASE INVOLVING NATIVE CORONARY ARTERY OF NATIVE HEART WITHOUT ANGINA PECTORIS: ICD-10-CM

## 2022-01-20 DIAGNOSIS — I10 BENIGN ESSENTIAL HTN: Primary | ICD-10-CM

## 2022-01-20 DIAGNOSIS — Z12.5 SCREENING PSA (PROSTATE SPECIFIC ANTIGEN): ICD-10-CM

## 2022-01-20 DIAGNOSIS — E78.00 ELEVATED CHOLESTEROL: ICD-10-CM

## 2022-01-20 DIAGNOSIS — R73.01 IMPAIRED FASTING GLUCOSE: ICD-10-CM

## 2022-01-20 DIAGNOSIS — Z98.61 H/O CORONARY ANGIOPLASTY: ICD-10-CM

## 2022-01-20 DIAGNOSIS — R91.8 ABNORMAL FINDING ON LUNG IMAGING: ICD-10-CM

## 2022-01-20 DIAGNOSIS — I65.22 STENOSIS OF LEFT CAROTID ARTERY: ICD-10-CM

## 2022-01-20 DIAGNOSIS — R80.1 PERSISTENT PROTEINURIA: ICD-10-CM

## 2022-01-20 DIAGNOSIS — E87.5 HIGH POTASSIUM: ICD-10-CM

## 2022-01-20 DIAGNOSIS — N18.32 STAGE 3B CHRONIC KIDNEY DISEASE: ICD-10-CM

## 2022-01-20 PROBLEM — K86.89 MASS OF PANCREAS: Status: ACTIVE | Noted: 2020-06-26

## 2022-01-20 PROBLEM — K86.2 CYST OF PANCREAS: Status: ACTIVE | Noted: 2020-02-06

## 2022-01-20 PROCEDURE — G0438 PPPS, INITIAL VISIT: HCPCS | Performed by: PHYSICIAN ASSISTANT

## 2022-01-20 PROCEDURE — 99214 OFFICE O/P EST MOD 30 MIN: CPT | Performed by: PHYSICIAN ASSISTANT

## 2022-01-20 RX ORDER — CLOPIDOGREL BISULFATE 75 MG/1
75 TABLET ORAL DAILY
Qty: 90 TABLET | Refills: 3 | Status: SHIPPED | OUTPATIENT
Start: 2022-01-20 | End: 2022-12-04

## 2022-01-20 RX ORDER — ROSUVASTATIN CALCIUM 20 MG/1
20 TABLET, COATED ORAL DAILY
Qty: 90 TABLET | Refills: 3 | Status: SHIPPED | OUTPATIENT
Start: 2022-01-20 | End: 2023-04-05

## 2022-01-20 RX ORDER — AMLODIPINE BESYLATE 5 MG/1
5 TABLET ORAL DAILY
Qty: 90 TABLET | Refills: 1 | Status: SHIPPED | OUTPATIENT
Start: 2022-01-20 | End: 2022-07-13

## 2022-01-20 RX ORDER — METOPROLOL SUCCINATE 50 MG/1
50 TABLET, EXTENDED RELEASE ORAL DAILY
Qty: 90 TABLET | Refills: 3 | Status: SHIPPED | OUTPATIENT
Start: 2022-01-20 | End: 2022-10-14

## 2022-01-20 NOTE — PROGRESS NOTES
Subjective   Bob Monahan is a 71 y.o. male.     History of Present Illness       He was inpatient from 7/18 to 7/27/2020 acute pancreatitis   Sees DR Dennis; see CT abd pelvis report below--had f/u CT  Home glucose staying up 170s highest; average 110 and need labs  Had lipids Gary and LDL was 66  Had bx pancreas and was cyst--not cancer then had acute pancreatitis  Was inpatient for this and had TPN then  Had follow-up with GI doctor Dr. Barron 12/20/2021 for his GERD and history of esophageal stricture and collagenous colitis.  Noted last colonoscopy was January 2, 2017.  And that Dr. Dennis still monitors that pancreatic cystic mass.    Sees cardiologist for CAD and severe aortic stenosis s/p CABG x 1 with AVR (2/20). He has comorbid conditions that include HTN, HLD and further as outlined below. His primary cardiologist is Bryce Huitron MD  Coronary artery disease involving native coronary artery of native heart without angina pectoris Yes   • Benign essential HTN   • S/P CABG (coronary artery bypass graft)   • S/P AVR (aortic valve replacement)     1. CAD  -Status post CABG x1 with AVR in 2/20  -Patient denies anginal chest pain or equivalents  -Continue ASA, Plavix, beta-blocker, statin  -LDL noted to be 38 with labs from 9/2020    2. Chronic diastolic heart failure  -Appears euvolemic on exam  -ECHO 11/19: EF 71%. Impaired relaxation compatible with diastolic dysfunction.     3. Hypertension  -Well-controlled at office visit today  -Continue medications as currently prescribed    PLAN:    1. Echocardiogram to evaluate function and structure  2. Follow-up with PCP for annual evaluation and labs  3. Continue medications as currently prescribed  4. Pending results of echocardiogram, follow-up in 6 months or sooner if needed    Encounter Medications     Signed Prescriptions Disp Refills   • nitroglycerin (NITROSTAT) 0.4 MG SL tablet 25 tablet 3   Sig: Place 1 tablet under the tongue every 5 (five)  minutes as needed for Chest pain.   • metoprolol, TOPROL-XL, 50 MG 24 hr tablet 90 tablet 3   Sig: Take 1 tablet by mouth daily.   • rosuvastatin (CRESTOR) 20 MG tablet 90 tablet 3   Sig: Take 1 tablet by mouth daily.     Please note that the summary of the plan was based on an established/existing care plan developed by the cardiologist.    Thank you for allowing us to participate in the care of this patient.    Sincerely,  Ev Kingsley PA-C  [facility]    Seen by: Ev Kingsley PA-C  Under the direction of: Manolo Stearns MD   Primary cardiologist: Bryce Huitron MD       Concern CT abd 8-13-21 notes:      There is slight progression of suspected fibrotic change at the lung bases. No acute bone abnormality.     Sees Dr Kinney for vascular  He cont to see nephrologist; DR Irving  He does see urologist; DR Joseph  cardiologist Dr. Huitron----8-1-21---CAD;      Also sees vascular for left subclavian artery steal phenomenon, carotid artery stenosis with surgical repair left, peripheral artery disease.  He continued treatment for his collagenous colitis with mesalamine and to continue generic Nexium for esophageal dysphagia.  Saw Dr. Dennis 8/13/2021---  Assessment, Management and Plan:   I reviewed and discussed the pts recent CT scan results in detail. Duct is more dilated but no sign of tumor. EUS test from last year showed no tumor as well. I will px Creon to assist with pancreatic enzyme absorption and assist with his BMs. Since pt does not have any weight loss and abd pain, I recommend proceeding with observation with alternating EUS and CT scan. I recommend also proceeding with routine EGD and colonoscopy. All pt questions and concerns were answered. Pt agrees with plan. RTC precautions discussed, otherwise f/u with EUS, EGD, and routine colonoscopy in 7 months. CT scan will happen during winter of 2022.      Incidental note on CT abc;  8-13-21;  pulm fibrosis  The following portions  of the patient's history were reviewed and updated as appropriate: allergies, current medications, past family history, past medical history, past social history, past surgical history and problem list.    Review of Systems   Constitutional: Negative for activity change, appetite change and unexpected weight change.   HENT: Negative for nosebleeds and trouble swallowing.    Eyes: Negative for pain and visual disturbance.   Respiratory: Negative for chest tightness, shortness of breath and wheezing.    Cardiovascular: Negative for chest pain and palpitations.   Gastrointestinal: Negative for abdominal pain and blood in stool.   Endocrine: Negative.    Genitourinary: Negative for difficulty urinating and hematuria.   Musculoskeletal: Negative for joint swelling.   Skin: Negative for color change and rash.   Allergic/Immunologic: Negative.    Neurological: Negative for syncope and speech difficulty.   Hematological: Negative for adenopathy.   Psychiatric/Behavioral: Negative for agitation and confusion.   All other systems reviewed and are negative.      Objective   Physical Exam  Vitals and nursing note reviewed.   Constitutional:       General: He is not in acute distress.     Appearance: He is well-developed.      Comments: Looks age   HENT:      Head: Normocephalic and atraumatic.      Right Ear: External ear normal.      Left Ear: External ear normal.      Nose: Nose normal.   Eyes:      General: No scleral icterus.        Right eye: No discharge.         Left eye: No discharge.      Conjunctiva/sclera: Conjunctivae normal.      Pupils: Pupils are equal, round, and reactive to light.   Neck:      Thyroid: No thyromegaly.      Vascular: Carotid bruit present.      Trachea: No tracheal deviation.   Cardiovascular:      Rate and Rhythm: Normal rate and regular rhythm.      Pulses: Normal pulses.      Heart sounds: Murmur heard.   No gallop.       Comments: Decreased pulse left dorsalis pedis and posterior tibia---  this is chronic  Trace pedal edema = bilat  Pulmonary:      Effort: Pulmonary effort is normal. No respiratory distress.      Breath sounds: Normal breath sounds. No wheezing or rales.   Abdominal:      Palpations: Abdomen is soft.   Musculoskeletal:         General: Normal range of motion.      Cervical back: Normal range of motion and neck supple.      Right lower leg: No edema.      Left lower leg: No edema.   Skin:     General: Skin is warm.      Coloration: Skin is not pale.      Findings: No erythema or rash.   Neurological:      General: No focal deficit present.      Mental Status: He is alert. Mental status is at baseline.      Motor: No abnormal muscle tone.      Coordination: Coordination normal.   Psychiatric:         Mood and Affect: Mood normal.         Behavior: Behavior normal.         Thought Content: Thought content normal.         Judgment: Judgment normal.         Assessment/Plan   Diagnoses and all orders for this visit:    1. Benign essential HTN (Primary)  -     Comprehensive metabolic panel  -     Lipid panel  -     CBC and Differential  -     TSH  -     T3, Free  -     T4, Free  -     PSA Screen  -     Vitamin B12  -     Folate  -     Vitamin D 25 Hydroxy  -     Urinalysis With Microscopic - Urine, Clean Catch  -     MicroAlbumin, Urine, Random - Urine, Clean Catch    2. Elevated cholesterol  -     Comprehensive metabolic panel  -     Lipid panel  -     CBC and Differential  -     TSH  -     T3, Free  -     T4, Free  -     PSA Screen  -     Vitamin B12  -     Folate  -     Vitamin D 25 Hydroxy  -     Urinalysis With Microscopic - Urine, Clean Catch  -     MicroAlbumin, Urine, Random - Urine, Clean Catch    3. Coronary artery disease involving native coronary artery of native heart without angina pectoris  -     Comprehensive metabolic panel  -     Lipid panel  -     CBC and Differential  -     TSH  -     T3, Free  -     T4, Free  -     PSA Screen  -     Vitamin B12  -     Folate  -      Vitamin D 25 Hydroxy  -     Urinalysis With Microscopic - Urine, Clean Catch  -     MicroAlbumin, Urine, Random - Urine, Clean Catch    4. H/O coronary angioplasty  -     Comprehensive metabolic panel  -     Lipid panel  -     CBC and Differential  -     TSH  -     T3, Free  -     T4, Free  -     PSA Screen  -     Vitamin B12  -     Folate  -     Vitamin D 25 Hydroxy  -     Urinalysis With Microscopic - Urine, Clean Catch  -     MicroAlbumin, Urine, Random - Urine, Clean Catch    5. Impaired fasting glucose  -     Comprehensive metabolic panel  -     Lipid panel  -     CBC and Differential  -     TSH  -     T3, Free  -     T4, Free  -     PSA Screen  -     Vitamin B12  -     Folate  -     Vitamin D 25 Hydroxy  -     Urinalysis With Microscopic - Urine, Clean Catch  -     MicroAlbumin, Urine, Random - Urine, Clean Catch    6. Stenosis of left carotid artery  -     Comprehensive metabolic panel  -     Lipid panel  -     CBC and Differential  -     TSH  -     T3, Free  -     T4, Free  -     PSA Screen  -     Vitamin B12  -     Folate  -     Vitamin D 25 Hydroxy  -     Urinalysis With Microscopic - Urine, Clean Catch  -     MicroAlbumin, Urine, Random - Urine, Clean Catch    7. Stage 3b chronic kidney disease (HCC)  -     Comprehensive metabolic panel  -     Lipid panel  -     CBC and Differential  -     TSH  -     T3, Free  -     T4, Free  -     PSA Screen  -     Vitamin B12  -     Folate  -     Vitamin D 25 Hydroxy  -     Urinalysis With Microscopic - Urine, Clean Catch  -     MicroAlbumin, Urine, Random - Urine, Clean Catch    8. Abnormal finding on lung imaging  Comments:  There is slight progression of suspected fibrotic change at the lung bases. No acute bone abnormality. ---on Abd CT 8-13-21    Orders:  -     Ambulatory Referral to Pulmonology  -     Comprehensive metabolic panel  -     Lipid panel  -     CBC and Differential  -     TSH  -     T3, Free  -     T4, Free  -     PSA Screen  -     Vitamin B12  -      Folate  -     Vitamin D 25 Hydroxy  -     Urinalysis With Microscopic - Urine, Clean Catch  -     MicroAlbumin, Urine, Random - Urine, Clean Catch    9. Screening PSA (prostate specific antigen)  -     Comprehensive metabolic panel  -     Lipid panel  -     CBC and Differential  -     TSH  -     T3, Free  -     T4, Free  -     PSA Screen  -     Vitamin B12  -     Folate  -     Vitamin D 25 Hydroxy  -     Urinalysis With Microscopic - Urine, Clean Catch  -     MicroAlbumin, Urine, Random - Urine, Clean Catch    Other orders  -     metoprolol succinate XL (TOPROL-XL) 50 MG 24 hr tablet; Take 1 tablet by mouth Daily. For HEART  Dispense: 90 tablet; Refill: 3  -     amLODIPine (NORVASC) 5 MG tablet; Take 1 tablet by mouth Daily. For BP  Dispense: 90 tablet; Refill: 1  -     clopidogrel (PLAVIX) 75 MG tablet; Take 1 tablet by mouth Daily. For heart  Dispense: 90 tablet; Refill: 3  -     rosuvastatin (CRESTOR) 20 MG tablet; Take 1 tablet by mouth Daily. For cholesterol  Dispense: 90 tablet; Refill: 3      8-13-21 CT There is slight progression of suspected fibrotic change at the lung bases. No acute bone abnormality.   See pulm  F/u cardio--has appt  Sees vascular--carotid disease  See nephrologist--CKD

## 2022-01-20 NOTE — PATIENT INSTRUCTIONS
Medicare Wellness  Personal Prevention Plan of Service     Date of Office Visit:  2022  Encounter Provider:  Tiarra Yi PA-C  Place of Service:  [facility]  Patient Name: Bob Monahan  :  1950    As part of the Medicare Wellness portion of your visit today, we are providing you with this personalized preventive plan of services (PPPS). This plan is based upon recommendations of the [place] Preventive Services Task Force (USPSTF) and the Advisory Committee on Immunization Practices (ACIP).    This lists the preventive care services that should be considered, and provides dates of when you are due. Items listed as completed are up-to-date and do not require any further intervention.    Health Maintenance   Topic Date Due   • TDAP/TD VACCINES (1 - Tdap) Never done   • ZOSTER VACCINE (1 of 2) Never done   • LIPID PANEL  2021   • COLORECTAL CANCER SCREENING  2022   • ANNUAL WELLNESS VISIT  2023   • COVID-19 Vaccine  Completed   • INFLUENZA VACCINE  Completed   • Pneumococcal Vaccine 65+  Completed   • HEPATITIS C SCREENING  Discontinued   • AAA SCREEN (ONE-TIME)  Discontinued       Orders Placed This Encounter   Procedures   • Comprehensive metabolic panel     Order Specific Question:   Release to patient     Answer:   Immediate   • Lipid panel   • TSH     Order Specific Question:   Release to patient     Answer:   Immediate   • T3, Free     Order Specific Question:   Release to patient     Answer:   Immediate   • T4, Free     Order Specific Question:   Release to patient     Answer:   Immediate   • PSA Screen     Order Specific Question:   Release to patient     Answer:   Immediate   • Vitamin B12     Order Specific Question:   Release to patient     Answer:   Immediate   • Folate     Order Specific Question:   Release to patient     Answer:   Immediate   • Vitamin D 25 Hydroxy     Order Specific Question:   Release to patient     Answer:    "Immediate   • MicroAlbumin, Urine, Random - Urine, Clean Catch     Order Specific Question:   Release to patient     Answer:   Immediate   • Ambulatory Referral to Pulmonology     Referral Priority:   Routine     Referral Type:   Consultation     Referral Reason:   Specialty Services Required     Referred to Provider:   Parveen Lopez MD     Requested Specialty:   Pulmonary Disease     Number of Visits Requested:   1   • CBC and Differential     Order Specific Question:   Manual Differential     Answer:   No   • Urinalysis With Microscopic - Urine, Clean Catch     Order Specific Question:   Release to patient     Answer:   Immediate       No follow-ups on file.          Hypertension, Adult  High blood pressure (hypertension) is when the force of blood pumping through the arteries is too strong. The arteries are the blood vessels that carry blood from the heart throughout the body. Hypertension forces the heart to work harder to pump blood and may cause arteries to become narrow or stiff. Untreated or uncontrolled hypertension can cause a heart attack, heart failure, a stroke, kidney disease, and other problems.  A blood pressure reading consists of a higher number over a lower number. Ideally, your blood pressure should be below 120/80. The first (\"top\") number is called the systolic pressure. It is a measure of the pressure in your arteries as your heart beats. The second (\"bottom\") number is called the diastolic pressure. It is a measure of the pressure in your arteries as the heart relaxes.  What are the causes?  The exact cause of this condition is not known. There are some conditions that result in or are related to high blood pressure.  What increases the risk?  Some risk factors for high blood pressure are under your control. The following factors may make you more likely to develop this condition:  · Smoking.  · Having type 2 diabetes mellitus, high cholesterol, or both.  · Not getting enough exercise or " physical activity.  · Being overweight.  · Having too much fat, sugar, calories, or salt (sodium) in your diet.  · Drinking too much alcohol.  Some risk factors for high blood pressure may be difficult or impossible to change. Some of these factors include:  · Having chronic kidney disease.  · Having a family history of high blood pressure.  · Age. Risk increases with age.  · Race. You may be at higher risk if you are .  · Gender. Men are at higher risk than women before age 45. After age 65, women are at higher risk than men.  · Having obstructive sleep apnea.  · Stress.  What are the signs or symptoms?  High blood pressure may not cause symptoms. Very high blood pressure (hypertensive crisis) may cause:  · Headache.  · Anxiety.  · Shortness of breath.  · Nosebleed.  · Nausea and vomiting.  · Vision changes.  · Severe chest pain.  · Seizures.  How is this diagnosed?  This condition is diagnosed by measuring your blood pressure while you are seated, with your arm resting on a flat surface, your legs uncrossed, and your feet flat on the floor. The cuff of the blood pressure monitor will be placed directly against the skin of your upper arm at the level of your heart. It should be measured at least twice using the same arm. Certain conditions can cause a difference in blood pressure between your right and left arms.  Certain factors can cause blood pressure readings to be lower or higher than normal for a short period of time:  · When your blood pressure is higher when you are in a health care provider's office than when you are at home, this is called white coat hypertension. Most people with this condition do not need medicines.  · When your blood pressure is higher at home than when you are in a health care provider's office, this is called masked hypertension. Most people with this condition may need medicines to control blood pressure.  If you have a high blood pressure reading during one visit or  you have normal blood pressure with other risk factors, you may be asked to:  · Return on a different day to have your blood pressure checked again.  · Monitor your blood pressure at home for 1 week or longer.  If you are diagnosed with hypertension, you may have other blood or imaging tests to help your health care provider understand your overall risk for other conditions.  How is this treated?  This condition is treated by making healthy lifestyle changes, such as eating healthy foods, exercising more, and reducing your alcohol intake. Your health care provider may prescribe medicine if lifestyle changes are not enough to get your blood pressure under control, and if:  · Your systolic blood pressure is above 130.  · Your diastolic blood pressure is above 80.  Your personal target blood pressure may vary depending on your medical conditions, your age, and other factors.  Follow these instructions at home:  Eating and drinking    · Eat a diet that is high in fiber and potassium, and low in sodium, added sugar, and fat. An example eating plan is called the DASH (Dietary Approaches to Stop Hypertension) diet. To eat this way:  ? Eat plenty of fresh fruits and vegetables. Try to fill one half of your plate at each meal with fruits and vegetables.  ? Eat whole grains, such as whole-wheat pasta, brown rice, or whole-grain bread. Fill about one fourth of your plate with whole grains.  ? Eat or drink low-fat dairy products, such as skim milk or low-fat yogurt.  ? Avoid fatty cuts of meat, processed or cured meats, and poultry with skin. Fill about one fourth of your plate with lean proteins, such as fish, chicken without skin, beans, eggs, or tofu.  ? Avoid pre-made and processed foods. These tend to be higher in sodium, added sugar, and fat.  · Reduce your daily sodium intake. Most people with hypertension should eat less than 1,500 mg of sodium a day.  · Do not drink alcohol if:  ? Your health care provider tells you  not to drink.  ? You are pregnant, may be pregnant, or are planning to become pregnant.  · If you drink alcohol:  ? Limit how much you use to:  § 0-1 drink a day for women.  § 0-2 drinks a day for men.  ? Be aware of how much alcohol is in your drink. In the [place] one drink equals one 12 oz bottle of beer (355 mL), one 5 oz glass of wine (148 mL), or one 1½ oz glass of hard liquor (44 mL).    Lifestyle    · Work with your health care provider to maintain a healthy body weight or to lose weight. Ask what an ideal weight is for you.  · Get at least 30 minutes of exercise most days of the week. Activities may include walking, swimming, or biking.  · Include exercise to strengthen your muscles (resistance exercise), such as Pilates or lifting weights, as part of your weekly exercise routine. Try to do these types of exercises for 30 minutes at least 3 days a week.  · Do not use any products that contain nicotine or tobacco, such as cigarettes, e-cigarettes, and chewing tobacco. If you need help quitting, ask your health care provider.  · Monitor your blood pressure at home as told by your health care provider.  · Keep all follow-up visits as told by your health care provider. This is important.    Medicines  · Take over-the-counter and prescription medicines only as told by your health care provider. Follow directions carefully. Blood pressure medicines must be taken as prescribed.  · Do not skip doses of blood pressure medicine. Doing this puts you at risk for problems and can make the medicine less effective.  · Ask your health care provider about side effects or reactions to medicines that you should watch for.  Contact a health care provider if you:  · Think you are having a reaction to a medicine you are taking.  · Have headaches that keep coming back (recurring).  · Feel dizzy.  · Have swelling in your ankles.  · Have trouble with your vision.  Get help right away if you:  · Develop a severe headache or  confusion.  · Have unusual weakness or numbness.  · Feel faint.  · Have severe pain in your chest or abdomen.  · Vomit repeatedly.  · Have trouble breathing.  Summary  · Hypertension is when the force of blood pumping through your arteries is too strong. If this condition is not controlled, it may put you at risk for serious complications.  · Your personal target blood pressure may vary depending on your medical conditions, your age, and other factors. For most people, a normal blood pressure is less than 120/80.  · Hypertension is treated with lifestyle changes, medicines, or a combination of both. Lifestyle changes include losing weight, eating a healthy, low-sodium diet, exercising more, and limiting alcohol.  This information is not intended to replace advice given to you by your health care provider. Make sure you discuss any questions you have with your health care provider.  Document Revised: 08/28/2019 Document Reviewed: 08/28/2019  [employer] Patient Education © 2021 [employer]

## 2022-01-20 NOTE — PROGRESS NOTES
The ABCs of the Annual Wellness Visit  Subsequent Medicare Wellness Visit    Chief Complaint   Patient presents with   • Welcome To Medicare      Subjective    History of Present Illness:  Bob Monahan is a 71 y.o. male who presents for a Subsequent Medicare Wellness Visit.    The following portions of the patient's history were reviewed and   updated as appropriate: allergies, current medications, past family history, past medical history, past social history, past surgical history and problem list.    Compared to one year ago, the patient feels his physical   health is the same.    Compared to one year ago, the patient feels his mental   health is the same.    Recent Hospitalizations:  He was not admitted to the hospital during the last year.       Current Medical Providers:  Patient Care Team:  Tiarra Yi PA-C as PCP - General (Family Medicine)  Bryce Huitron II, MD as Consulting Physician (Interventional Cardiology)  Tristen Kinney MD as Surgeon (Vascular Surgery)  Prosper Barron MD as Consulting Physician (Gastroenterology)  Félix Joseph MD as Consulting Physician (Urology)  Colette Irving MD as Consulting Physician (Nephrology)  Colette Irving MD as Consulting Physician (Nephrology)  Prosper Barron MD as Consulting Physician (Gastroenterology)    Outpatient Medications Prior to Visit   Medication Sig Dispense Refill   • amLODIPine (NORVASC) 5 MG tablet Take 1 tablet by mouth Daily. For BP 90 tablet 1   • aspirin 81 MG tablet Take 81 mg by mouth Daily.     • Aspirin-Acetaminophen-Caffeine (GOODYS EXTRA STRENGTH PO) Take 1 package by mouth As Needed.     • Blood Glucose Monitoring Suppl (Accu-Chek Guide Me) w/Device kit See Admin Instructions.     • buPROPion XL (WELLBUTRIN XL) 150 MG 24 hr tablet Take 1 tablet by mouth Daily. For mood;  Last appt Sept 2020.  Will not refill again without appt in next few weeks 30 tablet 0   • cilostazol (PLETAL) 50 MG tablet Take 50 mg  by mouth 2 (Two) Times a Day.     • clopidogrel (PLAVIX) 75 MG tablet TAKE 1 TABLET BY MOUTH DAILY 90 tablet 0   • esomeprazole (nexIUM) 20 MG capsule Take 20 mg by mouth Every Morning Before Breakfast.     • mesalamine (LIALDA) 1.2 g EC tablet TK 2 TS PO D WITH KASH  1   • metoprolol succinate XL (TOPROL-XL) 50 MG 24 hr tablet TAKE 1 TABLET BY MOUTH EVERY MORNING FOR HEART AND BLOOD PRESSURE 90 tablet 0   • Misc Natural Products (PROSTATE HEALTH PO) Take  by mouth Daily.     • nitroglycerin (NITROSTAT) 0.4 MG SL tablet Place 0.4 mg under the tongue Every 5 (Five) Minutes As Needed.     • rosuvastatin (CRESTOR) 20 MG tablet Take 1 tablet by mouth Daily. For cholesterol 90 tablet 3     No facility-administered medications prior to visit.       No opioid medication identified on active medication list. I have reviewed chart for other potential  high risk medication/s and harmful drug interactions in the elderly.          Aspirin is on active medication list. Aspirin use is indicated based on review of current medical condition/s. Pros and cons of this therapy have been discussed today. Benefits of this medication outweigh potential harm.  Patient has been encouraged to continue taking this medication.  .      Patient Active Problem List   Diagnosis   • Benign essential HTN   • Degenerative joint disease involving multiple joints   • Elevated cholesterol   • H/O acute myocardial infarction   • Chronic depression   • Hypogonadism in male   • Nonrheumatic aortic valve stenosis   • H/O coronary angioplasty   • Chronic renal impairment, stage 3 (moderate) (HCC)   • History of skin cancer   • Coronary artery disease involving native coronary artery of native heart   • Colitis   • Stenosis of left carotid artery   • Carotid stenosis, asymptomatic, bilateral   • Abnormal CT of brain   • Impaired fasting glucose   • Cleft lip   • Acidosis   • Acute kidney failure (HCC)   • Anxiety   • Diarrhea   • ROMAN (dyspnea on exertion)   •  "Echocardiogram abnormal   • ED (erectile dysfunction)   • Esophageal dysphagia   • Hypertension   • Old MI (myocardial infarction)   • Other disorders of phosphorus metabolism   • Presence of stent in coronary artery   • Aortic valve stenosis   • CAD in native artery   • Coronary artery disease   • Chronic renal insufficiency, stage III (moderate) (HCC)   • CRI (chronic renal insufficiency), stage 4 (severe) (HCC)   • CRI (chronic renal insufficiency)   • Hypertensive chronic kidney disease with stage 1 through stage 4 chronic kidney disease, or unspecified chronic kidney disease   • Collagenous colitis   • DJD (degenerative joint disease)   • Hyperlipidemia   • Chest pain   • Chest tightness   • Chronic diastolic heart failure (HCC)   • S/P CABG (coronary artery bypass graft)   • Pancreatic mass   • Abdominal pain   • Esophageal dysphagia   • Gastrointestinal hemorrhage   • Cyst of pancreas   • Mass of pancreas     Advance Care Planning  Advance Directive is on file.  ACP discussion was held with the patient during this visit. Patient has an advance directive in EMR which is still valid.           Objective    Vitals:    22 1452   BP: 110/62   BP Location: Left arm   Patient Position: Sitting   Cuff Size: Adult   Pulse: 78   Resp: 16   Temp: 97.5 °F (36.4 °C)   SpO2: 97%   Weight: 70.4 kg (155 lb 3.2 oz)   Height: 165.1 cm (65\")     BMI Readings from Last 1 Encounters:   22 25.83 kg/m²   BMI is above normal parameters. Recommendations include: exercise counseling    Does the patient have evidence of cognitive impairment? No    Physical Exam            HEALTH RISK ASSESSMENT    Smoking Status:  Social History     Tobacco Use   Smoking Status Former Smoker   • Packs/day: 0.25   • Years: 25.00   • Pack years: 6.25   • Types: Cigarettes   • Quit date:    • Years since quittin.0   Smokeless Tobacco Never Used     Alcohol Consumption:  Social History     Substance and Sexual Activity   Alcohol Use " Yes   • Alcohol/week: 7.0 standard drinks   • Types: 7 Shots of liquor per week     Fall Risk Screen:    BABAKJON Fall Risk Assessment was completed, and patient is at LOW risk for falls.Assessment completed on:1/20/2022    Depression Screening:  PHQ-2/PHQ-9 Depression Screening 1/20/2022   Little interest or pleasure in doing things 0   Feeling down, depressed, or hopeless 0   Trouble falling or staying asleep, or sleeping too much 0   Feeling tired or having little energy 0   Poor appetite or overeating 0   Feeling bad about yourself - or that you are a failure or have let yourself or your family down 0   Trouble concentrating on things, such as reading the newspaper or watching television 0   Moving or speaking so slowly that other people could have noticed. Or the opposite - being so fidgety or restless that you have been moving around a lot more than usual 0   Thoughts that you would be better off dead, or of hurting yourself in some way 0   Total Score 0   If you checked off any problems, how difficult have these problems made it for you to do your work, take care of things at home, or get along with other people? Not difficult at all       Health Habits and Functional and Cognitive Screening:  Functional & Cognitive Status 1/20/2022   Do you have difficulty preparing food and eating? No   Do you have difficulty bathing yourself, getting dressed or grooming yourself? No   Do you have difficulty using the toilet? No   Do you have difficulty moving around from place to place? No   Do you have trouble with steps or getting out of a bed or a chair? No   Current Diet Well Balanced Diet   Dental Exam Up to date   Eye Exam Up to date   Exercise (times per week) 5 times per week   Current Exercises Include Walking   Current Exercise Activities Include -   Do you need help using the phone?  No   Are you deaf or do you have serious difficulty hearing?  No   Do you need help with transportation? No   Do you need help  shopping? No   Do you need help preparing meals?  No   Do you need help with housework?  No   Do you need help with laundry? No   Do you need help taking your medications? No   Do you need help managing money? No   Do you ever drive or ride in a car without wearing a seat belt? No   Have you felt unusual stress, anger or loneliness in the last month? No   Who do you live with? Spouse   If you need help, do you have trouble finding someone available to you? No   Have you been bothered in the last four weeks by sexual problems? No   Do you have difficulty concentrating, remembering or making decisions? No       Age-appropriate Screening Schedule:  Refer to the list below for future screening recommendations based on patient's age, sex and/or medical conditions. Orders for these recommended tests are listed in the plan section. The patient has been provided with a written plan.    Health Maintenance   Topic Date Due   • TDAP/TD VACCINES (1 - Tdap) Never done   • ZOSTER VACCINE (1 of 2) Never done   • LIPID PANEL  09/28/2021   • INFLUENZA VACCINE  Completed              Assessment/Plan   CMS Preventative Services Quick Reference  Risk Factors Identified During Encounter  Cardiovascular Disease  The above risks/problems have been discussed with the patient.  Follow up actions/plans if indicated are seen below in the Assessment/Plan Section.  Pertinent information has been shared with the patient in the After Visit Summary.    There are no diagnoses linked to this encounter.    Follow Up:   No follow-ups on file.

## 2022-01-21 LAB
25(OH)D3+25(OH)D2 SERPL-MCNC: 32.5 NG/ML (ref 30–100)
ALBUMIN SERPL-MCNC: 4.5 G/DL (ref 3.8–4.8)
ALBUMIN/GLOB SERPL: 1.7 {RATIO} (ref 1.2–2.2)
ALP SERPL-CCNC: 90 IU/L (ref 44–121)
ALT SERPL-CCNC: 24 IU/L (ref 0–44)
APPEARANCE UR: CLEAR
AST SERPL-CCNC: 29 IU/L (ref 0–40)
BACTERIA #/AREA URNS HPF: NORMAL /[HPF]
BASOPHILS # BLD AUTO: 0 X10E3/UL (ref 0–0.2)
BASOPHILS NFR BLD AUTO: 1 %
BILIRUB SERPL-MCNC: 0.2 MG/DL (ref 0–1.2)
BILIRUB UR QL STRIP: NEGATIVE
BUN SERPL-MCNC: 32 MG/DL (ref 8–27)
BUN/CREAT SERPL: 20 (ref 10–24)
CALCIUM SERPL-MCNC: 9.6 MG/DL (ref 8.6–10.2)
CASTS URNS QL MICRO: NORMAL /LPF
CHLORIDE SERPL-SCNC: 107 MMOL/L (ref 96–106)
CHOLEST SERPL-MCNC: 142 MG/DL (ref 100–199)
CO2 SERPL-SCNC: 23 MMOL/L (ref 20–29)
COLOR UR: YELLOW
CREAT SERPL-MCNC: 1.61 MG/DL (ref 0.76–1.27)
EOSINOPHIL # BLD AUTO: 0 X10E3/UL (ref 0–0.4)
EOSINOPHIL NFR BLD AUTO: 0 %
EPI CELLS #/AREA URNS HPF: NORMAL /HPF (ref 0–10)
ERYTHROCYTE [DISTWIDTH] IN BLOOD BY AUTOMATED COUNT: 13.1 % (ref 11.6–15.4)
FOLATE SERPL-MCNC: 10.5 NG/ML
GLOBULIN SER CALC-MCNC: 2.7 G/DL (ref 1.5–4.5)
GLUCOSE SERPL-MCNC: 120 MG/DL (ref 65–99)
GLUCOSE UR QL STRIP: NEGATIVE
HCT VFR BLD AUTO: 43.2 % (ref 37.5–51)
HDLC SERPL-MCNC: 48 MG/DL
HGB BLD-MCNC: 14.4 G/DL (ref 13–17.7)
HGB UR QL STRIP: NEGATIVE
IMM GRANULOCYTES # BLD AUTO: 0 X10E3/UL (ref 0–0.1)
IMM GRANULOCYTES NFR BLD AUTO: 0 %
KETONES UR QL STRIP: NEGATIVE
LDLC SERPL CALC-MCNC: 62 MG/DL (ref 0–99)
LEUKOCYTE ESTERASE UR QL STRIP: NEGATIVE
LYMPHOCYTES # BLD AUTO: 2 X10E3/UL (ref 0.7–3.1)
LYMPHOCYTES NFR BLD AUTO: 32 %
MCH RBC QN AUTO: 30.3 PG (ref 26.6–33)
MCHC RBC AUTO-ENTMCNC: 33.3 G/DL (ref 31.5–35.7)
MCV RBC AUTO: 91 FL (ref 79–97)
MICRO URNS: ABNORMAL
MICROALBUMIN UR-MCNC: 408.3 UG/ML
MONOCYTES # BLD AUTO: 0.5 X10E3/UL (ref 0.1–0.9)
MONOCYTES NFR BLD AUTO: 8 %
NEUTROPHILS # BLD AUTO: 3.7 X10E3/UL (ref 1.4–7)
NEUTROPHILS NFR BLD AUTO: 59 %
NITRITE UR QL STRIP: NEGATIVE
PH UR STRIP: 6 [PH] (ref 5–7.5)
PLATELET # BLD AUTO: 196 X10E3/UL (ref 150–450)
POTASSIUM SERPL-SCNC: 5.4 MMOL/L (ref 3.5–5.2)
PROT SERPL-MCNC: 7.2 G/DL (ref 6–8.5)
PROT UR QL STRIP: ABNORMAL
PSA SERPL-MCNC: 0.4 NG/ML (ref 0–4)
RBC # BLD AUTO: 4.76 X10E6/UL (ref 4.14–5.8)
RBC #/AREA URNS HPF: NORMAL /HPF (ref 0–2)
SODIUM SERPL-SCNC: 147 MMOL/L (ref 134–144)
SP GR UR STRIP: 1.02 (ref 1–1.03)
T3FREE SERPL-MCNC: 3.5 PG/ML (ref 2–4.4)
T4 FREE SERPL-MCNC: 1.15 NG/DL (ref 0.82–1.77)
TRIGL SERPL-MCNC: 193 MG/DL (ref 0–149)
TSH SERPL-ACNC: 2.73 UIU/ML (ref 0.45–4.5)
UROBILINOGEN UR STRIP-MCNC: 0.2 MG/DL (ref 0.2–1)
VIT B12 SERPL-MCNC: 871 PG/ML (ref 232–1245)
VLDLC SERPL CALC-MCNC: 32 MG/DL (ref 5–40)
WBC # BLD AUTO: 6.3 X10E3/UL (ref 3.4–10.8)
WBC #/AREA URNS HPF: NORMAL /HPF (ref 0–5)

## 2022-01-25 LAB
HBA1C MFR BLD: 6.4 % (ref 4.8–5.6)
Lab: NORMAL
WRITTEN AUTHORIZATION: NORMAL

## 2022-02-07 ENCOUNTER — OFFICE VISIT (OUTPATIENT)
Dept: FAMILY MEDICINE CLINIC | Facility: CLINIC | Age: 72
End: 2022-02-07

## 2022-02-07 VITALS
BODY MASS INDEX: 25.33 KG/M2 | TEMPERATURE: 97.5 F | SYSTOLIC BLOOD PRESSURE: 110 MMHG | HEIGHT: 65 IN | HEART RATE: 80 BPM | OXYGEN SATURATION: 98 % | WEIGHT: 152 LBS | RESPIRATION RATE: 16 BRPM | DIASTOLIC BLOOD PRESSURE: 62 MMHG

## 2022-02-07 DIAGNOSIS — E87.5 HIGH POTASSIUM: ICD-10-CM

## 2022-02-07 DIAGNOSIS — Z98.61 H/O CORONARY ANGIOPLASTY: ICD-10-CM

## 2022-02-07 DIAGNOSIS — I10 PRIMARY HYPERTENSION: ICD-10-CM

## 2022-02-07 DIAGNOSIS — N18.32 STAGE 3B CHRONIC KIDNEY DISEASE: ICD-10-CM

## 2022-02-07 DIAGNOSIS — I10 BENIGN ESSENTIAL HTN: ICD-10-CM

## 2022-02-07 DIAGNOSIS — R73.01 IMPAIRED FASTING GLUCOSE: Primary | ICD-10-CM

## 2022-02-07 PROBLEM — N52.9 ERECTILE DYSFUNCTION: Status: ACTIVE | Noted: 2019-12-26

## 2022-02-07 PROBLEM — J96.01 ACUTE RESPIRATORY FAILURE WITH HYPOXIA: Status: ACTIVE | Noted: 2020-02-13

## 2022-02-07 PROBLEM — R13.19 ESOPHAGEAL DYSPHAGIA: Status: ACTIVE | Noted: 2018-11-14

## 2022-02-07 PROBLEM — Z95.5 STENTED CORONARY ARTERY: Status: ACTIVE | Noted: 2019-12-26

## 2022-02-07 PROBLEM — I25.2 OLD MYOCARDIAL INFARCTION: Status: ACTIVE | Noted: 2019-12-26

## 2022-02-07 PROBLEM — Z95.2 HISTORY OF AORTIC VALVE REPLACEMENT: Status: ACTIVE | Noted: 2020-02-10

## 2022-02-07 PROBLEM — M19.90 OSTEOARTHRITIS: Status: ACTIVE | Noted: 2019-12-26

## 2022-02-07 PROCEDURE — 99214 OFFICE O/P EST MOD 30 MIN: CPT | Performed by: PHYSICIAN ASSISTANT

## 2022-02-07 NOTE — PROGRESS NOTES
"Subjective   Bob Monahan is a 70 y.o. male.     History of Present Illness    Since the last visit, he has overall felt fairly well.  He has Essential Hypertension and well controlled on current medication, Impaired fasting glucose and will monitor labs to watch for DMII, CAD and remains under care of their Cardiologist for mangement, Seasonal allergies and doing well on their medication , Vitamin D deficiency and will update labs for continued management and Mixed hyperlipidemia and goal of LDL is to be less than 70 will update labs goal not met last labs 7 months ago..  he has been compliant with current medications have reviewed them.  The patient denies medication side effects.  Will refill medications. /68 (BP Location: Right arm, Patient Position: Sitting, Cuff Size: Adult)   Pulse 81   Temp 97.3 °F (36.3 °C) (Skin)   Resp 16   Ht 165.1 cm (65\")   Wt 71.7 kg (158 lb)   SpO2 97%   BMI 26.29 kg/m²   Also sees vascular for left subclavian artery steal phenomenon, carotid artery stenosis with surgical repair left, peripheral artery disease  Results for orders placed or performed in visit on 03/04/20   Comprehensive Metabolic Panel    Specimen: Blood   Result Value Ref Range    Glucose 115 (H) 65 - 99 mg/dL    BUN 22 8 - 23 mg/dL    Creatinine 1.38 (H) 0.76 - 1.27 mg/dL    Sodium 139 136 - 145 mmol/L    Potassium 4.7 3.5 - 5.2 mmol/L    Chloride 104 98 - 107 mmol/L    CO2 18.3 (L) 22.0 - 29.0 mmol/L    Calcium 9.5 8.6 - 10.5 mg/dL    Total Protein 7.6 6.0 - 8.5 g/dL    Albumin 4.10 3.50 - 5.20 g/dL    ALT (SGPT) 32 1 - 41 U/L    AST (SGOT) 19 1 - 40 U/L    Alkaline Phosphatase 127 (H) 39 - 117 U/L    Total Bilirubin 0.3 0.2 - 1.2 mg/dL    eGFR Non African Amer 51 (L) >60 mL/min/1.73    Globulin 3.5 gm/dL    A/G Ratio 1.2 g/dL    BUN/Creatinine Ratio 15.9 7.0 - 25.0    Anion Gap 16.7 (H) 5.0 - 15.0 mmol/L   CBC Auto Differential    Specimen: Blood   Result Value Ref Range    WBC 7.05 3.40 - 10.80 " 10*3/mm3    RBC 3.85 (L) 4.14 - 5.80 10*6/mm3    Hemoglobin 11.7 (L) 13.0 - 17.7 g/dL    Hematocrit 34.9 (L) 37.5 - 51.0 %    MCV 90.6 79.0 - 97.0 fL    MCH 30.4 26.6 - 33.0 pg    MCHC 33.5 31.5 - 35.7 g/dL    RDW 14.6 12.3 - 15.4 %    RDW-SD 48.5 37.0 - 54.0 fl    MPV 8.7 6.0 - 12.0 fL    Platelets 228 140 - 450 10*3/mm3    Neutrophil % 69.9 42.7 - 76.0 %    Lymphocyte % 20.4 19.6 - 45.3 %    Monocyte % 8.1 5.0 - 12.0 %    Eosinophil % 0.3 0.3 - 6.2 %    Basophil % 0.9 0.0 - 1.5 %    Immature Grans % 0.4 0.0 - 0.5 %    Neutrophils, Absolute 4.93 1.70 - 7.00 10*3/mm3    Lymphocytes, Absolute 1.44 0.70 - 3.10 10*3/mm3    Monocytes, Absolute 0.57 0.10 - 0.90 10*3/mm3    Eosinophils, Absolute 0.02 0.00 - 0.40 10*3/mm3    Basophils, Absolute 0.06 0.00 - 0.20 10*3/mm3    Immature Grans, Absolute 0.03 0.00 - 0.05 10*3/mm3    nRBC 0.0 0.0 - 0.2 /100 WBC       He was inpatient from 7/18 to 7/27/2024 acute pancreatitis   Sees DR Dennis; see CT abd pelvis report below--had f/u CT  Home glucose staying up 170s highest; average 110 and need labs  Had lipids Gary and LDL was 66  Had bx pancreas and was cyst--not cancer then had acute pancreatitis  Was inpatient for this and had TPN  Sees cardiologist for CAD with hx CABG with AVR, severe aortic stenosis, HTN  Sees Dr Kinney for vascular  He cont to see nephrologist; DR Irving  He does see urologist; DR Joseph  cardiologist Dr. Huitron    Last CT abd was 8-24-20  CONCLUSION:    1. Resolution of changes of acute pancreatitis including resolution of the enlarging cystic lesion head of pancreas. Peripancreatic edema has almost completely resolved with only mild amount of increased attenuation remaining within the fat about the   head and uncinate process of pancreas.  2. Stable changes of chronic pancreatitis. No enhancing pancreatic lesion detected on today's examination.  3. Resolved abdominal and pelvic ascites.  4. Otherwise stable chronic changes of lower chest, abdomen,  and pelvis.    He is to f/u Dr Dennis on pancreas in several mos  Take taper Wellbutrin by taking QOD a few weeks and can stop; started it to stop smoking.  Dr Dennis has him on Nexium  Last A1C 5.9 Feb    LDL Feb 82 and want <70  The following portions of the patient's history were reviewed and updated as appropriate: allergies, current medications, past family history, past medical history, past social history, past surgical history and problem list.    Review of Systems    Objective   Physical Exam  Vitals signs and nursing note reviewed.   Constitutional:       General: He is not in acute distress.     Appearance: He is well-developed.      Comments: Looks age   HENT:      Head: Normocephalic and atraumatic.      Right Ear: External ear normal.      Left Ear: External ear normal.      Nose: Nose normal.   Eyes:      General: No scleral icterus.        Right eye: No discharge.         Left eye: No discharge.      Conjunctiva/sclera: Conjunctivae normal.      Pupils: Pupils are equal, round, and reactive to light.   Neck:      Musculoskeletal: Normal range of motion and neck supple.      Thyroid: No thyromegaly.      Vascular: Carotid bruit present.      Trachea: No tracheal deviation.   Cardiovascular:      Rate and Rhythm: Normal rate and regular rhythm.      Pulses: Normal pulses.      Heart sounds: Murmur present. No gallop.       Comments: Decreased pulse left dorsalis pedis and posterior tibia--- this is chronic  Trace pedal edema = bilat  Pulmonary:      Effort: Pulmonary effort is normal. No respiratory distress.      Breath sounds: Normal breath sounds. No wheezing or rales.   Abdominal:      Palpations: Abdomen is soft.   Musculoskeletal: Normal range of motion.   Skin:     General: Skin is warm.      Coloration: Skin is not pale.      Findings: No erythema or rash.   Neurological:      General: No focal deficit present.      Mental Status: He is alert. Mental status is at baseline.      Motor: No  abnormal muscle tone.      Coordination: Coordination normal.   Psychiatric:         Mood and Affect: Mood normal.         Behavior: Behavior normal.         Thought Content: Thought content normal.         Judgment: Judgment normal.         Assessment/Plan   Bob was seen today for hypertension.    Diagnoses and all orders for this visit:    Impaired fasting glucose  -     Comprehensive metabolic panel  -     Lipid panel  -     T4, Free  -     T3, Free  -     Vitamin B12  -     Folate  -     TSH  -     CBC & Differential  -     Hemoglobin A1c  -     Ambulatory Referral to Nutrition Services    Essential hypertension  -     Comprehensive metabolic panel  -     Lipid panel  -     T4, Free  -     T3, Free  -     Vitamin B12  -     Folate  -     TSH  -     CBC & Differential  -     Hemoglobin A1c  -     Ambulatory Referral to Nutrition Services    CAD in native artery  -     Comprehensive metabolic panel  -     Lipid panel  -     T4, Free  -     T3, Free  -     Vitamin B12  -     Folate  -     TSH  -     CBC & Differential  -     Hemoglobin A1c  -     Ambulatory Referral to Nutrition Services    Anxiety  -     Comprehensive metabolic panel  -     Lipid panel  -     T4, Free  -     T3, Free  -     Vitamin B12  -     Folate  -     TSH  -     CBC & Differential  -     Hemoglobin A1c  -     Ambulatory Referral to Nutrition Services    Benign essential HTN  -     Comprehensive metabolic panel  -     Lipid panel  -     T4, Free  -     T3, Free  -     Vitamin B12  -     Folate  -     TSH  -     CBC & Differential  -     Hemoglobin A1c  -     Ambulatory Referral to Nutrition Services    Hyperlipidemia, mixed  -     Comprehensive metabolic panel  -     Lipid panel  -     T4, Free  -     T3, Free  -     Vitamin B12  -     Folate  -     TSH  -     CBC & Differential  -     Hemoglobin A1c  -     Ambulatory Referral to Nutrition Services    Carotid stenosis, asymptomatic, bilateral  -     Comprehensive metabolic panel  -      Lipid panel  -     T4, Free  -     T3, Free  -     Vitamin B12  -     Folate  -     TSH  -     CBC & Differential  -     Hemoglobin A1c  -     Ambulatory Referral to Nutrition Services    Chronic renal insufficiency, stage III (moderate) (CMS/LTAC, located [facility])  -     Comprehensive metabolic panel  -     Lipid panel  -     T4, Free  -     T3, Free  -     Vitamin B12  -     Folate  -     TSH  -     CBC & Differential  -     Hemoglobin A1c  -     Ambulatory Referral to Nutrition Services    Other orders  -     Fluad Quad >65 years  -     Pneumococcal Polysaccharide Vaccine 23-Valent Greater Than or Equal To 1yo Subcutaneous / IM  -     buPROPion XL (WELLBUTRIN XL) 150 MG 24 hr tablet; Take 1 tablet by mouth Daily. For mood  -     rosuvastatin (CRESTOR) 20 MG tablet; Take 1 tablet by mouth Daily. For cholesterol  -     metoprolol succinate XL (TOPROL-XL) 50 MG 24 hr tablet; Take 1 tablet by mouth Every Morning. For heart and BP  -     clopidogrel (PLAVIX) 75 MG tablet; Take 1 tablet by mouth Daily.  -     amLODIPine (NORVASC) 5 MG tablet; Take 1 tablet by mouth Daily. For BP      Follow-up with vascular for peripheral artery disease, carotid atherosclerotic disease, left subclavian artery steal phenomenon  Will update A1c and check lipids to make sure LDL is less than 70  Follow-up with --pancreas; did not have cancer  Plan, Bob Monahan, was seen today.  he was seen for HTN and continue medication, Imparied fasting glucose and plan follow up labs, diet, and exercise, Hyperlipidemia and will continue current medication, CAD and is under care of their Cardiologist for medical management, Seasonal allergies and is doing well on their medication PRN and Vitamin D deficiency and will update labs .  Follow-up with nephrology for KCDIII  labs  Flu shot and pneumovax 23 due      Yes

## 2022-02-07 NOTE — PATIENT INSTRUCTIONS
Diabetes Mellitus Basics    Diabetes mellitus, or diabetes, is a long-term (chronic) disease. It occurs when the body does not properly use sugar (glucose) that is released from food after you eat.  Diabetes mellitus may be caused by one or both of these problems:  · Your pancreas does not make enough of a hormone called insulin.  · Your body does not react in a normal way to the insulin that it makes.  Insulin lets glucose enter cells in your body. This gives you energy. If you have diabetes, glucose cannot get into cells. This causes high blood glucose (hyperglycemia).  How to treat and manage diabetes  You may need to take insulin or other diabetes medicines daily to keep your glucose in balance. If you are prescribed insulin, you will learn how to give yourself insulin by injection. You may need to adjust the amount of insulin you take based on the foods that you eat.  You will need to check your blood glucose levels using a glucose monitor as told by your health care provider. The readings can help determine if you have low or high blood glucose.  Generally, you should have these blood glucose levels:  · Before meals (preprandial):  mg/dL (4.4-7.2 mmol/L).  · After meals (postprandial): below 180 mg/dL (10 mmol/L).  · Hemoglobin A1c (HbA1c) level: less than 7%.  Your health care provider will set treatment goals for you.  Keep all follow-up visits. This is important.  Follow these instructions at home:  Diabetes medicines  Take your diabetes medicines every day as told by your health care provider. List your diabetes medicines here:  · Name of medicine: ______________________________  ? Amount (dose): _______________ Time (a.m./p.m.): _______________ Notes: ___________________________________  · Name of medicine: ______________________________  ? Amount (dose): _______________ Time (a.m./p.m.): _______________ Notes: ___________________________________  · Name of medicine:  ______________________________  ? Amount (dose): _______________ Time (a.m./p.m.): _______________ Notes: ___________________________________  Insulin  If you use insulin, list the types of insulin you use here:  · Insulin type: ______________________________  ? Amount (dose): _______________ Time (a.m./p.m.): _______________Notes: ___________________________________  · Insulin type: ______________________________  ? Amount (dose): _______________ Time (a.m./p.m.): _______________ Notes: ___________________________________  · Insulin type: ______________________________  ? Amount (dose): _______________ Time (a.m./p.m.): _______________ Notes: ___________________________________  · Insulin type: ______________________________  ? Amount (dose): _______________ Time (a.m./p.m.): _______________ Notes: ___________________________________  · Insulin type: ______________________________  ? Amount (dose): _______________ Time (a.m./p.m.): _______________ Notes: ___________________________________  Managing blood glucose    Check your blood glucose levels using a glucose monitor as told by your health care provider.  Write down the times that you check your glucose levels here:  · Time: _______________ Notes: ___________________________________  · Time: _______________ Notes: ___________________________________  · Time: _______________ Notes: ___________________________________  · Time: _______________ Notes: ___________________________________  · Time: _______________ Notes: ___________________________________  · Time: _______________ Notes: ___________________________________    Low blood glucose  Low blood glucose (hypoglycemia) is when glucose is at or below 70 mg/dL (3.9 mmol/L). Symptoms may include:  · Feeling:  ? Hungry.  ? Sweaty and clammy.  ? Irritable or easily upset.  ? Dizzy.  ? Sleepy.  · Having:  ? A fast heartbeat.  ? A headache.  ? A change in your vision.  ? Numbness around the mouth, lips, or  tongue.  · Having trouble with:  ? Moving (coordination).  ? Sleeping.  Treating low blood glucose  To treat low blood glucose, eat or drink something containing sugar right away. If you can think clearly and swallow safely, follow the 15:15 rule:  · Take 15 grams of a fast-acting carb (carbohydrate), as told by your health care provider.  · Some fast-acting carbs are:  ? Glucose tablets: take 3-4 tablets.  ? Hard candy: eat 3-5 pieces.  ? Fruit juice: drink 4 oz (120 mL).  ? Regular (not diet) soda: drink 4-6 oz (120-180 mL).  ? Honey or sugar: eat 1 Tbsp (15 mL).  · Check your blood glucose levels 15 minutes after you take the carb.  · If your glucose is still at or below 70 mg/dL (3.9 mmol/L), take 15 grams of a carb again.  · If your glucose does not go above 70 mg/dL (3.9 mmol/L) after 3 tries, get help right away.  · After your glucose goes back to normal, eat a meal or a snack within 1 hour.  Treating very low blood glucose  If your glucose is at or below 54 mg/dL (3 mmol/L), you have very low blood glucose (severe hypoglycemia).  This is an emergency. Do not wait to see if the symptoms will go away. Get medical help right away. Call your local emergency services (911 in the [place] Do not drive yourself to the hospital.  Questions to ask your health care provider  · Should I talk with a diabetes educator?  · What equipment will I need to care for myself at home?  · What diabetes medicines do I need? When should I take them?  · How often do I need to check my blood glucose levels?  · What number can I call if I have questions?  · When is my follow-up visit?  · Where can I find a support group for people with diabetes?  Where to find more information  · American Diabetes Association: www.diabetes.org  · Association of Diabetes Care and Education Specialists: www.diabeteseducator.org  Contact a health care provider if:  · Your blood glucose is at or above 240 mg/dL (13.3 mmol/L) for 2 days in a row.  · You have  been sick or have had a fever for 2 days or more, and you are not getting better.  · You have any of these problems for more than 6 hours:  ? You cannot eat or drink.  ? You feel nauseous.  ? You vomit.  ? You have diarrhea.  Get help right away if:  · Your blood glucose is lower than 54 mg/dL (3 mmol/L).  · You get confused.  · You have trouble thinking clearly.  · You have trouble breathing.  These symptoms may represent a serious problem that is an emergency. Do not wait to see if the symptoms will go away. Get medical help right away. Call your local emergency services (911 in the [place] Do not drive yourself to the hospital.  Summary  · Diabetes mellitus is a chronic disease that occurs when the body does not properly use sugar (glucose) that is released from food after you eat.  · Take insulin and diabetes medicines as told.  · Check your blood glucose every day, as often as told.  · Keep all follow-up visits. This is important.  This information is not intended to replace advice given to you by your health care provider. Make sure you discuss any questions you have with your health care provider.  Document Revised: 04/20/2021 Document Reviewed: 04/20/2021  [employer] Patient Education © 2021 [employer]

## 2022-02-07 NOTE — PROGRESS NOTES
"Subjective   Bob Monahan is a 71 y.o. male.     History of Present Illness      Since the last visit, he has overall felt fairly well.  He has Primary Hypertension and well controlled on current medication, Impaired fasting glucose and will monitor labs to watch for DMII, Hyperlipidemia with goals met with current Rx, CAD and remains under care of their Cardiologist for mangement, CAD and remains on medication regimen and Vitamin D deficiency and labs are at goal >30 ng/mL.  he has been compliant with current medications have reviewed them.  The patient denies medication side effects.  Will refill medications. /62 (BP Location: Right arm, Patient Position: Sitting, Cuff Size: Adult)   Pulse 80   Temp 97.5 °F (36.4 °C)   Resp 16   Ht 165.1 cm (65\")   Wt 68.9 kg (152 lb)   SpO2 98%   BMI 25.29 kg/m²     Results for orders placed or performed in visit on 01/20/22   Comprehensive metabolic panel    Specimen: Blood   Result Value Ref Range    Glucose 120 (H) 65 - 99 mg/dL    BUN 32 (H) 8 - 27 mg/dL    Creatinine 1.61 (H) 0.76 - 1.27 mg/dL    eGFR Non African Am 43 (L) >59 mL/min/1.73    eGFR African Am 49 (L) >59 mL/min/1.73    BUN/Creatinine Ratio 20 10 - 24    Sodium 147 (H) 134 - 144 mmol/L    Potassium 5.4 (H) 3.5 - 5.2 mmol/L    Chloride 107 (H) 96 - 106 mmol/L    Total CO2 23 20 - 29 mmol/L    Calcium 9.6 8.6 - 10.2 mg/dL    Total Protein 7.2 6.0 - 8.5 g/dL    Albumin 4.5 3.8 - 4.8 g/dL    Globulin 2.7 1.5 - 4.5 g/dL    A/G Ratio 1.7 1.2 - 2.2    Total Bilirubin 0.2 0.0 - 1.2 mg/dL    Alkaline Phosphatase 90 44 - 121 IU/L    AST (SGOT) 29 0 - 40 IU/L    ALT (SGPT) 24 0 - 44 IU/L   Lipid panel    Specimen: Blood   Result Value Ref Range    Total Cholesterol 142 100 - 199 mg/dL    Triglycerides 193 (H) 0 - 149 mg/dL    HDL Cholesterol 48 >39 mg/dL    VLDL Cholesterol Sumit 32 5 - 40 mg/dL    LDL Chol Calc ([facility]) 62 0 - 99 mg/dL   TSH    Specimen: Blood   Result Value Ref Range    TSH 2.730 0.450 - " 4.500 uIU/mL   T3, Free    Specimen: Blood   Result Value Ref Range    T3, Free 3.5 2.0 - 4.4 pg/mL   T4, Free    Specimen: Blood   Result Value Ref Range    Free T4 1.15 0.82 - 1.77 ng/dL   PSA Screen    Specimen: Blood   Result Value Ref Range    PSA 0.4 0.0 - 4.0 ng/mL   Vitamin B12    Specimen: Blood   Result Value Ref Range    Vitamin B-12 871 232 - 1,245 pg/mL   Folate    Specimen: Blood   Result Value Ref Range    Folate 10.5 >3.0 ng/mL   Vitamin D 25 Hydroxy    Specimen: Blood   Result Value Ref Range    25 Hydroxy, Vitamin D 32.5 30.0 - 100.0 ng/mL   MicroAlbumin, Urine, Random - Urine, Clean Catch    Specimen: Urine, Clean Catch   Result Value Ref Range    Microalbumin, Urine 408.3 Not Estab. ug/mL   Microscopic Examination -   Result Value Ref Range    WBC, UA None seen 0 - 5 /hpf    RBC, UA None seen 0 - 2 /hpf    Epithelial Cells (non renal) None seen 0 - 10 /hpf    Casts None seen None seen /lpf    Bacteria, UA None seen None seen/Few   Hemoglobin A1c   Result Value Ref Range    Hemoglobin A1C 6.4 (H) 4.8 - 5.6 %   Please Note   Result Value Ref Range    Please note Comment    Written Authorization   Result Value Ref Range    Written Authorization Comment    CBC and Differential    Specimen: Blood   Result Value Ref Range    WBC 6.3 3.4 - 10.8 x10E3/uL    RBC 4.76 4.14 - 5.80 x10E6/uL    Hemoglobin 14.4 13.0 - 17.7 g/dL    Hematocrit 43.2 37.5 - 51.0 %    MCV 91 79 - 97 fL    MCH 30.3 26.6 - 33.0 pg    MCHC 33.3 31.5 - 35.7 g/dL    RDW 13.1 11.6 - 15.4 %    Platelets 196 150 - 450 x10E3/uL    Neutrophil Rel % 59 Not Estab. %    Lymphocyte Rel % 32 Not Estab. %    Monocyte Rel % 8 Not Estab. %    Eosinophil Rel % 0 Not Estab. %    Basophil Rel % 1 Not Estab. %    Neutrophils Absolute 3.7 1.4 - 7.0 x10E3/uL    Lymphocytes Absolute 2.0 0.7 - 3.1 x10E3/uL    Monocytes Absolute 0.5 0.1 - 0.9 x10E3/uL    Eosinophils Absolute 0.0 0.0 - 0.4 x10E3/uL    Basophils Absolute 0.0 0.0 - 0.2 x10E3/uL    Immature  Granulocyte Rel % 0 Not Estab. %    Immature Grans Absolute 0.0 0.0 - 0.1 x10E3/uL   Urinalysis With Microscopic - Urine, Clean Catch    Specimen: Urine, Clean Catch   Result Value Ref Range    Specific Gravity, UA 1.020 1.005 - 1.030    pH, UA 6.0 5.0 - 7.5    Color, UA Yellow Yellow    Appearance, UA Clear Clear    Leukocytes, UA Negative Negative    Protein 2+ (A) Negative/Trace    Glucose, UA Negative Negative    Ketones Negative Negative    Blood, UA Negative Negative    Bilirubin, UA Negative Negative    Urobilinogen, UA 0.2 0.2 - 1.0 mg/dL    Nitrite, UA Negative Negative    Microscopic Examination See below:            Add A1c due to glucose 120    Lab Results   Component Value Date    HGBA1C 6.4 (H) 01/20/2022       Concerned potassium 5.4 and want him to repeat lab-----okay not fasting------he did see nephrology-----1-28-22 and did ask him to start low K+ diet and low salt---has f/u.   and this needs to be in the next few days--- he is not on an ACE or an ARB  He needs to see Dr. Irving his nephrologist for the chronic kidney disease and note the protein in the urine and concerned about his potassium elevation.  LDL cholesterol is at goal of less than 70.  Mild elevation in triglycerides work on diet and exercise.  Liver enzymes normal this time.  No anemia of chronic disease with his normal CBC.  Other labs okay  The following portions of the patient's history were reviewed and updated as appropriate: allergies, current medications, past family history, past medical history, past social history, past surgical history and problem list.     My last notes    He was inpatient from 7/18 to 7/27/2020 acute pancreatitis   Sees DR Dennis; see CT abd pelvis report below--had f/u CT  Home glucose staying up 170s highest; average 110 and need labs  Had lipids Gary and LDL was 66  Had bx pancreas and was cyst--not cancer then had acute pancreatitis  Was inpatient for this and had TPN then  Had follow-up with GI doctor  Dr. Barron 12/20/2021 for his GERD and history of esophageal stricture and collagenous colitis.  Noted last colonoscopy was January 2, 2017.  And that Dr. Dennis still monitors that pancreatic cystic mass.   Sees Dr Kinney for vascular  He cont to see nephrologist; DR Irving  He does see urologist; DR Joseph  cardiologist Dr. Huitron----8-1-21---CAD    Also sees vascular for left subclavian artery steal phenomenon, carotid artery stenosis with surgical repair left, peripheral artery disease.  He continued treatment for his collagenous colitis with mesalamine and to continue generic Nexium for esophageal dysphagia.  Saw Dr. Dennis 8/13/2021---  Assessment, Management and   Review of Systems   Constitutional: Negative for activity change, appetite change and unexpected weight change.   HENT: Negative for nosebleeds and trouble swallowing.    Eyes: Negative for pain and visual disturbance.   Respiratory: Negative for chest tightness, shortness of breath and wheezing.    Cardiovascular: Negative for chest pain and palpitations.   Gastrointestinal: Negative for abdominal pain and blood in stool.   Endocrine: Negative.    Genitourinary: Negative for difficulty urinating and hematuria.   Musculoskeletal: Negative for joint swelling.   Skin: Negative for color change and rash.   Allergic/Immunologic: Negative.    Neurological: Negative for syncope and speech difficulty.   Hematological: Negative for adenopathy.   Psychiatric/Behavioral: Negative for agitation and confusion.   All other systems reviewed and are negative.      Objective   Physical Exam  Vitals and nursing note reviewed.   Constitutional:       General: He is not in acute distress.     Appearance: He is well-developed.      Comments: Looks age   HENT:      Head: Normocephalic and atraumatic.      Right Ear: External ear normal.      Left Ear: External ear normal.      Nose: Nose normal.   Eyes:      General: No scleral icterus.        Right eye: No  discharge.         Left eye: No discharge.      Conjunctiva/sclera: Conjunctivae normal.      Pupils: Pupils are equal, round, and reactive to light.   Neck:      Thyroid: No thyromegaly.      Vascular: Carotid bruit present.      Trachea: No tracheal deviation.   Cardiovascular:      Rate and Rhythm: Normal rate and regular rhythm.      Pulses: Normal pulses.      Heart sounds: Murmur heard.   No gallop.       Comments: Decreased pulse left dorsalis pedis and posterior tibia--- this is chronic  Trace pedal edema = bilat  Pulmonary:      Effort: Pulmonary effort is normal. No respiratory distress.      Breath sounds: Normal breath sounds. No wheezing or rales.   Abdominal:      Palpations: Abdomen is soft.   Musculoskeletal:         General: Normal range of motion.      Cervical back: Normal range of motion and neck supple.      Right lower leg: No edema.      Left lower leg: No edema.   Skin:     General: Skin is warm.      Coloration: Skin is not pale.      Findings: No erythema or rash.   Neurological:      General: No focal deficit present.      Mental Status: He is alert. Mental status is at baseline.      Motor: No abnormal muscle tone.      Coordination: Coordination normal.   Psychiatric:         Mood and Affect: Mood normal.         Behavior: Behavior normal.         Thought Content: Thought content normal.         Judgment: Judgment normal.         Assessment/Plan   Diagnoses and all orders for this visit:    1. Impaired fasting glucose (Primary)  Comments:  borderline DMII  Orders:  -     Comprehensive metabolic panel  -     Lipid panel  -     Hemoglobin A1c    2. Primary hypertension  -     Comprehensive metabolic panel  -     Lipid panel  -     Hemoglobin A1c    3. Benign essential HTN  -     Comprehensive metabolic panel  -     Lipid panel  -     Hemoglobin A1c    4. H/O coronary angioplasty  -     Comprehensive metabolic panel  -     Lipid panel  -     Hemoglobin A1c    5. Stage 3b chronic kidney  disease (HCC)  -     Comprehensive metabolic panel  -     Lipid panel  -     Hemoglobin A1c    6. High potassium  -     Comprehensive metabolic panel  -     Lipid panel  -     Hemoglobin A1c    Other orders  -     glucose blood test strip; R73.1 check glucose daily and PRN for impaired fasting glucose  Dispense: 100 each; Refill: 12        He does have pulmonologist appt;  Note had incidential pulm fibrosis 8-13-21 scan  To see all specialist  I asked him to come back in today because he is on the threshold of diabetes with his A1c now at 6.4 in glucose 120. Will allow him to do more diet and exercise and check labs again in 3 months we will also follow-up on the triglycerides. As noted above he saw nephrology and is on diet restriction for his potassium level elevation. Need to watch labs closely and plan again in 3 months    Need to avoid DPP 4 inhibitors and GLP-1 agonist due to his history of pancreatitis  Need to avoid Metformin due to his renal disease  Want him to ask his cardiologist if he could possibly take generic Actos in the future for diabetes--- my concern is that this can make heart failure worse but I do not see a diagnosis of that

## 2022-02-21 ENCOUNTER — TRANSCRIBE ORDERS (OUTPATIENT)
Dept: ADMINISTRATIVE | Facility: HOSPITAL | Age: 72
End: 2022-02-21

## 2022-02-21 ENCOUNTER — LAB (OUTPATIENT)
Dept: LAB | Facility: HOSPITAL | Age: 72
End: 2022-02-21

## 2022-02-21 DIAGNOSIS — N18.9 CHRONIC KIDNEY DISEASE, UNSPECIFIED CKD STAGE: ICD-10-CM

## 2022-02-21 DIAGNOSIS — I12.9 HYPERTENSIVE NEPHROPATHY: ICD-10-CM

## 2022-02-21 DIAGNOSIS — N18.32 CHRONIC KIDNEY DISEASE (CKD) STAGE G3B/A1, MODERATELY DECREASED GLOMERULAR FILTRATION RATE (GFR) BETWEEN 30-44 ML/MIN/1.73 SQUARE METER AND ALBUMINURIA CREATININE RATIO LESS THAN 30 MG/G (CMS/H*: Primary | ICD-10-CM

## 2022-02-21 DIAGNOSIS — N18.32 CHRONIC KIDNEY DISEASE (CKD) STAGE G3B/A1, MODERATELY DECREASED GLOMERULAR FILTRATION RATE (GFR) BETWEEN 30-44 ML/MIN/1.73 SQUARE METER AND ALBUMINURIA CREATININE RATIO LESS THAN 30 MG/G (CMS/H*: ICD-10-CM

## 2022-02-21 LAB
ALBUMIN SERPL-MCNC: 4.1 G/DL (ref 3.5–5.2)
ANION GAP SERPL CALCULATED.3IONS-SCNC: 11 MMOL/L (ref 5–15)
BACTERIA UR QL AUTO: NORMAL /HPF
BILIRUB UR QL STRIP: NEGATIVE
BUN SERPL-MCNC: 20 MG/DL (ref 8–23)
BUN/CREAT SERPL: 14.7 (ref 7–25)
CALCIUM SPEC-SCNC: 9 MG/DL (ref 8.6–10.5)
CHLORIDE SERPL-SCNC: 108 MMOL/L (ref 98–107)
CLARITY UR: CLEAR
CO2 SERPL-SCNC: 24 MMOL/L (ref 22–29)
COLOR UR: YELLOW
CREAT SERPL-MCNC: 1.36 MG/DL (ref 0.76–1.27)
GFR SERPL CREATININE-BSD FRML MDRD: 52 ML/MIN/1.73
GLUCOSE SERPL-MCNC: 124 MG/DL (ref 65–99)
GLUCOSE UR STRIP-MCNC: NEGATIVE MG/DL
HGB UR QL STRIP.AUTO: NEGATIVE
HYALINE CASTS UR QL AUTO: NORMAL /LPF
KETONES UR QL STRIP: ABNORMAL
LEUKOCYTE ESTERASE UR QL STRIP.AUTO: NEGATIVE
NITRITE UR QL STRIP: NEGATIVE
PH UR STRIP.AUTO: 5.5 [PH] (ref 5–8)
PHOSPHATE SERPL-MCNC: 3.6 MG/DL (ref 2.5–4.5)
POTASSIUM SERPL-SCNC: 4.6 MMOL/L (ref 3.5–5.2)
PROT UR QL STRIP: ABNORMAL
RBC # UR STRIP: NORMAL /HPF
REF LAB TEST METHOD: NORMAL
SODIUM SERPL-SCNC: 143 MMOL/L (ref 136–145)
SP GR UR STRIP: 1.02 (ref 1–1.03)
SQUAMOUS #/AREA URNS HPF: NORMAL /HPF
UROBILINOGEN UR QL STRIP: ABNORMAL
WBC # UR STRIP: NORMAL /HPF

## 2022-02-21 PROCEDURE — 81001 URINALYSIS AUTO W/SCOPE: CPT

## 2022-02-21 PROCEDURE — 80069 RENAL FUNCTION PANEL: CPT

## 2022-02-21 PROCEDURE — 36415 COLL VENOUS BLD VENIPUNCTURE: CPT

## 2022-03-25 ENCOUNTER — TELEPHONE (OUTPATIENT)
Dept: FAMILY MEDICINE CLINIC | Facility: CLINIC | Age: 72
End: 2022-03-25

## 2022-03-25 DIAGNOSIS — R73.01 IMPAIRED FASTING GLUCOSE: Primary | ICD-10-CM

## 2022-03-25 NOTE — TELEPHONE ENCOUNTER
Caller: Bob Monahan    Relationship: Self    Best call back number: [phone]    What was the call regarding: PATIENT STATED THAT HIS PRESCRIPTION FOR glucose blood test strip REQUIRES [facility] CLAU BRENNAN TO FILL OUT PAPERWORK FOR [employer] SO THAT INSURANCE WILL COVER IT. PATIENT STATED THAT BIN NEEDS TO BE CONTACTED FOR THAT. PATIENT ALSO STATED THAT THE PRESCRIPTION WAS SENT IN FOR SOMETHING DIFFERENT THAN WHAT HE NORMALLY USES. PATIENT STATED THAT HE USES THE ACCU-CHECK TEST STRIPS. PLEASE ADVISE.     [id number] - [place], [place] - [address] AT [facility] [address] - [phone]  - [phone]    Do you require a callback: YES

## 2022-04-01 ENCOUNTER — TELEPHONE (OUTPATIENT)
Dept: FAMILY MEDICINE CLINIC | Facility: CLINIC | Age: 72
End: 2022-04-01

## 2022-04-01 NOTE — TELEPHONE ENCOUNTER
Caller: ANNETTE    Relationship: [facility]    Best call back number: [phone]    What is the best time to reach you: ANY    Who are you requesting to speak with (clinical staff, provider,  specific staff member): CLINICAL STAFF    Do you know the name of the person who called: ANNETTE    What was the call regarding: NEEDS PRIOR AUTHORIZATION ON HIS ACCUCHECK STRIPS.    Do you require a callback: YES

## 2022-04-29 ENCOUNTER — TRANSCRIBE ORDERS (OUTPATIENT)
Dept: ADMINISTRATIVE | Facility: HOSPITAL | Age: 72
End: 2022-04-29

## 2022-05-05 ENCOUNTER — TRANSCRIBE ORDERS (OUTPATIENT)
Dept: ADMINISTRATIVE | Facility: HOSPITAL | Age: 72
End: 2022-05-05

## 2022-05-05 DIAGNOSIS — J84.9 ILD (INTERSTITIAL LUNG DISEASE): ICD-10-CM

## 2022-05-05 DIAGNOSIS — R06.09 DOE (DYSPNEA ON EXERTION): Primary | ICD-10-CM

## 2022-05-13 ENCOUNTER — LAB (OUTPATIENT)
Dept: LAB | Facility: HOSPITAL | Age: 72
End: 2022-05-13

## 2022-05-13 LAB
ALBUMIN SERPL-MCNC: 4.4 G/DL (ref 3.5–5.2)
ALBUMIN/GLOB SERPL: 1.6 G/DL
ALP SERPL-CCNC: 90 U/L (ref 39–117)
ALT SERPL W P-5'-P-CCNC: 19 U/L (ref 1–41)
ANION GAP SERPL CALCULATED.3IONS-SCNC: 13.6 MMOL/L (ref 5–15)
AST SERPL-CCNC: 19 U/L (ref 1–40)
BILIRUB SERPL-MCNC: 0.4 MG/DL (ref 0–1.2)
BUN SERPL-MCNC: 29 MG/DL (ref 8–23)
BUN/CREAT SERPL: 20.3 (ref 7–25)
CALCIUM SPEC-SCNC: 9.5 MG/DL (ref 8.6–10.5)
CHLORIDE SERPL-SCNC: 103 MMOL/L (ref 98–107)
CHOLEST SERPL-MCNC: 132 MG/DL (ref 0–200)
CO2 SERPL-SCNC: 23.4 MMOL/L (ref 22–29)
CREAT SERPL-MCNC: 1.43 MG/DL (ref 0.76–1.27)
EGFRCR SERPLBLD CKD-EPI 2021: 52.4 ML/MIN/1.73
GLOBULIN UR ELPH-MCNC: 2.7 GM/DL
GLUCOSE SERPL-MCNC: 117 MG/DL (ref 65–99)
HBA1C MFR BLD: 5.8 % (ref 4.8–5.6)
HDLC SERPL-MCNC: 66 MG/DL (ref 40–60)
LDLC SERPL CALC-MCNC: 50 MG/DL (ref 0–100)
LDLC/HDLC SERPL: 0.75 {RATIO}
POTASSIUM SERPL-SCNC: 4.8 MMOL/L (ref 3.5–5.2)
PROT SERPL-MCNC: 7.1 G/DL (ref 6–8.5)
SODIUM SERPL-SCNC: 140 MMOL/L (ref 136–145)
TRIGL SERPL-MCNC: 82 MG/DL (ref 0–150)
VLDLC SERPL-MCNC: 16 MG/DL (ref 5–40)

## 2022-05-13 PROCEDURE — 36415 COLL VENOUS BLD VENIPUNCTURE: CPT | Performed by: PHYSICIAN ASSISTANT

## 2022-05-13 PROCEDURE — 80053 COMPREHEN METABOLIC PANEL: CPT | Performed by: PHYSICIAN ASSISTANT

## 2022-05-13 PROCEDURE — 80061 LIPID PANEL: CPT | Performed by: PHYSICIAN ASSISTANT

## 2022-05-13 PROCEDURE — 83036 HEMOGLOBIN GLYCOSYLATED A1C: CPT | Performed by: PHYSICIAN ASSISTANT

## 2022-07-13 RX ORDER — AMLODIPINE BESYLATE 5 MG/1
TABLET ORAL
Qty: 90 TABLET | Refills: 1 | Status: SHIPPED | OUTPATIENT
Start: 2022-07-13 | End: 2023-01-06

## 2022-07-22 ENCOUNTER — HOSPITAL ENCOUNTER (OUTPATIENT)
Dept: CT IMAGING | Facility: HOSPITAL | Age: 72
Discharge: HOME | End: 2022-07-22
Admitting: INTERNAL MEDICINE

## 2022-07-22 DIAGNOSIS — J84.9 ILD (INTERSTITIAL LUNG DISEASE): ICD-10-CM

## 2022-07-22 DIAGNOSIS — R06.09 DOE (DYSPNEA ON EXERTION): ICD-10-CM

## 2022-07-22 PROCEDURE — 71250 CT THORAX DX C-: CPT

## 2022-08-18 ENCOUNTER — LAB (OUTPATIENT)
Dept: LAB | Facility: HOSPITAL | Age: 72
End: 2022-08-18

## 2022-08-18 ENCOUNTER — TRANSCRIBE ORDERS (OUTPATIENT)
Dept: ADMINISTRATIVE | Facility: HOSPITAL | Age: 72
End: 2022-08-18

## 2022-08-18 DIAGNOSIS — N18.32 CHRONIC KIDNEY DISEASE (CKD) STAGE G3B/A1, MODERATELY DECREASED GLOMERULAR FILTRATION RATE (GFR) BETWEEN 30-44 ML/MIN/1.73 SQUARE METER AND ALBUMINURIA CREATININE RATIO LESS THAN 30 MG/G (CMS/H*: Primary | ICD-10-CM

## 2022-08-18 DIAGNOSIS — I12.9 HYPERTENSIVE NEPHROPATHY: ICD-10-CM

## 2022-08-18 DIAGNOSIS — N18.9 CHRONIC KIDNEY DISEASE, UNSPECIFIED CKD STAGE: ICD-10-CM

## 2022-08-18 DIAGNOSIS — N18.32 CHRONIC KIDNEY DISEASE (CKD) STAGE G3B/A1, MODERATELY DECREASED GLOMERULAR FILTRATION RATE (GFR) BETWEEN 30-44 ML/MIN/1.73 SQUARE METER AND ALBUMINURIA CREATININE RATIO LESS THAN 30 MG/G (CMS/H*: ICD-10-CM

## 2022-08-18 LAB
ALBUMIN SERPL-MCNC: 4.3 G/DL (ref 3.5–5.2)
ANION GAP SERPL CALCULATED.3IONS-SCNC: 16.1 MMOL/L (ref 5–15)
BACTERIA UR QL AUTO: NORMAL /HPF
BILIRUB UR QL STRIP: NEGATIVE
BUN SERPL-MCNC: 27 MG/DL (ref 8–23)
BUN/CREAT SERPL: 16.8 (ref 7–25)
CALCIUM SPEC-SCNC: 9.2 MG/DL (ref 8.6–10.5)
CHLORIDE SERPL-SCNC: 100 MMOL/L (ref 98–107)
CLARITY UR: CLEAR
CO2 SERPL-SCNC: 21.9 MMOL/L (ref 22–29)
COLOR UR: YELLOW
CREAT SERPL-MCNC: 1.61 MG/DL (ref 0.76–1.27)
EGFRCR SERPLBLD CKD-EPI 2021: 45.4 ML/MIN/1.73
GLUCOSE SERPL-MCNC: 87 MG/DL (ref 65–99)
GLUCOSE UR STRIP-MCNC: NEGATIVE MG/DL
HGB UR QL STRIP.AUTO: NEGATIVE
HYALINE CASTS UR QL AUTO: NORMAL /LPF
KETONES UR QL STRIP: NEGATIVE
LEUKOCYTE ESTERASE UR QL STRIP.AUTO: NEGATIVE
NITRITE UR QL STRIP: NEGATIVE
PH UR STRIP.AUTO: 7 [PH] (ref 5–8)
PHOSPHATE SERPL-MCNC: 4 MG/DL (ref 2.5–4.5)
POTASSIUM SERPL-SCNC: 4.1 MMOL/L (ref 3.5–5.2)
PROT UR QL STRIP: ABNORMAL
RBC # UR STRIP: NORMAL /HPF
REF LAB TEST METHOD: NORMAL
SODIUM SERPL-SCNC: 138 MMOL/L (ref 136–145)
SP GR UR STRIP: 1.01 (ref 1–1.03)
SQUAMOUS #/AREA URNS HPF: NORMAL /HPF
UROBILINOGEN UR QL STRIP: ABNORMAL
WBC # UR STRIP: NORMAL /HPF

## 2022-08-18 PROCEDURE — 81001 URINALYSIS AUTO W/SCOPE: CPT

## 2022-08-18 PROCEDURE — 36415 COLL VENOUS BLD VENIPUNCTURE: CPT | Performed by: NURSE PRACTITIONER

## 2022-08-18 PROCEDURE — 80069 RENAL FUNCTION PANEL: CPT | Performed by: NURSE PRACTITIONER

## 2022-08-31 ENCOUNTER — TRANSCRIBE ORDERS (OUTPATIENT)
Dept: ADMINISTRATIVE | Facility: HOSPITAL | Age: 72
End: 2022-08-31

## 2022-08-31 DIAGNOSIS — R91.8 PULMONARY NODULES: Primary | ICD-10-CM

## 2022-10-14 RX ORDER — METOPROLOL SUCCINATE 50 MG/1
50 TABLET, EXTENDED RELEASE ORAL DAILY
Qty: 90 TABLET | Refills: 3 | Status: SHIPPED | OUTPATIENT
Start: 2022-10-14

## 2022-10-18 RX ORDER — ROSUVASTATIN CALCIUM 20 MG/1
20 TABLET, COATED ORAL DAILY
Qty: 90 TABLET | Refills: 3 | OUTPATIENT
Start: 2022-10-18

## 2022-10-27 ENCOUNTER — HOSPITAL ENCOUNTER (OUTPATIENT)
Dept: CT IMAGING | Facility: HOSPITAL | Age: 72
Discharge: HOME | End: 2022-10-27
Admitting: INTERNAL MEDICINE

## 2022-10-27 DIAGNOSIS — R91.8 PULMONARY NODULES: ICD-10-CM

## 2022-10-27 PROCEDURE — 71250 CT THORAX DX C-: CPT

## 2022-10-28 ENCOUNTER — APPOINTMENT (OUTPATIENT)
Dept: CT IMAGING | Facility: HOSPITAL | Age: 72
End: 2022-10-28

## 2022-11-07 ENCOUNTER — TRANSCRIBE ORDERS (OUTPATIENT)
Dept: ADMINISTRATIVE | Facility: HOSPITAL | Age: 72
End: 2022-11-07

## 2022-11-07 DIAGNOSIS — R91.1 PULMONARY NODULE: Primary | ICD-10-CM

## 2022-11-09 NOTE — TELEPHONE ENCOUNTER
Caller: Bob Monahan    Relationship: Self    Best call back number: [phone] (Mobile)    What medication are you requesting: SOMETHING FOR SYMPTOMS    What are your current symptoms: DRAINAGE, CONGESTION, COUGH OFF AND ON, IRRITATION IN THROAT- NEGATIVE AT HOME COVID TEST 11/08/2022    How long have you been experiencing symptoms: 2 DAYS    Have you had these symptoms before:    [x] Yes  [] No    Have you been treated for these symptoms before:   [x] Yes  [] No    If a prescription is needed, what is your preferred pharmacy and phone number: [id number] [place], [place] - [address] AT [facility] - [phone]  - [phone]      Additional notes: PATIENT IS ASKING FOR THIS TO BE CALLED INTO THE PHARMACY ASAP, PLEASE ADVISE THE PATIENT IF THIS CAN BE CALLED INTO THE PHARMACY ASAP    PATIENT DID TRY TO SCHEDULE AN APPT WITH CLAU BRENNAN BUT NOTHING WAS AVAILABLE

## 2022-11-10 ENCOUNTER — OFFICE VISIT (OUTPATIENT)
Dept: FAMILY MEDICINE CLINIC | Facility: CLINIC | Age: 72
End: 2022-11-10

## 2022-11-10 VITALS
BODY MASS INDEX: 24.83 KG/M2 | SYSTOLIC BLOOD PRESSURE: 108 MMHG | OXYGEN SATURATION: 99 % | TEMPERATURE: 98.4 F | HEIGHT: 65 IN | RESPIRATION RATE: 16 BRPM | WEIGHT: 149 LBS | HEART RATE: 86 BPM | DIASTOLIC BLOOD PRESSURE: 70 MMHG

## 2022-11-10 DIAGNOSIS — R05.8 DRY COUGH: ICD-10-CM

## 2022-11-10 DIAGNOSIS — R09.81 NASAL CONGESTION: Primary | ICD-10-CM

## 2022-11-10 PROCEDURE — 99213 OFFICE O/P EST LOW 20 MIN: CPT

## 2022-11-10 RX ORDER — BENZONATATE 100 MG/1
100 CAPSULE ORAL 3 TIMES DAILY PRN
Qty: 30 CAPSULE | Refills: 1 | Status: SHIPPED | OUTPATIENT
Start: 2022-11-10 | End: 2023-04-05

## 2022-11-10 RX ORDER — LORATADINE 10 MG/1
10 TABLET ORAL DAILY
Qty: 30 TABLET | Refills: 1 | Status: SHIPPED | OUTPATIENT
Start: 2022-11-10

## 2022-11-10 NOTE — PROGRESS NOTES
"Chief Complaint  Sinus Congestion    Subjective          History of Present Illness    Bob Monahan 72 y.o. male who presents for evaluation of sinus congestion and a dry cough. Symptoms include congestion and dry cough.  Onset of symptoms was 2 days ago, stable since that time. Patient denies shortness of breath, wheezing, upper respiratory congestion, hemoptysis, pleuritic chest pain, fever, dyspnea on exertion, ear drainage, eye discharge, diarrhea, vomiting, vertigo, sneezing, chills, sweats, sinus pain, epistaxis, high fever, and joint pain.   Evaluation to date: none Treatment to date:  Coricidin HBP.     He had a negative Covid test on Tuesday.    He  denies medication side effects.    Review of Systems   Constitutional: Negative for appetite change, chills, fatigue and fever.   HENT: Positive for congestion (sinus). Negative for ear pain, sinus pressure, sore throat and trouble swallowing.    Eyes: Negative for visual disturbance.   Respiratory: Positive for cough (dry cough). Negative for chest tightness, shortness of breath and wheezing.    Cardiovascular: Negative for chest pain, palpitations and leg swelling.   Gastrointestinal: Negative for abdominal pain, constipation, diarrhea, nausea and vomiting.   Genitourinary: Negative for dysuria, frequency and urgency.   Musculoskeletal: Negative for gait problem, myalgias and neck pain.   Skin: Negative for rash.   Neurological: Negative for dizziness, syncope, weakness and light-headedness.   Psychiatric/Behavioral: Negative for dysphoric mood. The patient is not nervous/anxious.         Objective   Vital Signs:   /70   Pulse 86   Temp 98.4 °F (36.9 °C)   Resp 16   Ht 165.1 cm (65\")   Wt 67.6 kg (149 lb)   SpO2 99%   BMI 24.79 kg/m²      BMI is within normal parameters. No other follow-up for BMI required.        Physical Exam  Vitals and nursing note reviewed.   Constitutional:       General: He is not in acute distress.     Appearance: " Normal appearance. He is well-developed. He is not toxic-appearing or diaphoretic.   HENT:      Head: Normocephalic and atraumatic. Hair is normal.      Right Ear: External ear normal. No drainage, swelling or tenderness. Tympanic membrane is not retracted.      Left Ear: External ear normal. No drainage, swelling or tenderness. Tympanic membrane is not retracted.      Nose: Mucosal edema and congestion present. No rhinorrhea.      Right Sinus: No maxillary sinus tenderness or frontal sinus tenderness.      Left Sinus: No maxillary sinus tenderness or frontal sinus tenderness.      Mouth/Throat:      Mouth: Mucous membranes are moist. No oral lesions.      Pharynx: Uvula midline. No pharyngeal swelling, oropharyngeal exudate, posterior oropharyngeal erythema or uvula swelling.   Eyes:      General: No scleral icterus.        Right eye: No discharge.         Left eye: No discharge.      Conjunctiva/sclera: Conjunctivae normal.      Pupils: Pupils are equal, round, and reactive to light.   Cardiovascular:      Rate and Rhythm: Normal rate and regular rhythm.      Heart sounds: Normal heart sounds. No murmur heard.    No gallop.   Pulmonary:      Effort: No prolonged expiration or respiratory distress.      Breath sounds: Normal breath sounds. No stridor or decreased air movement. No decreased breath sounds, wheezing, rhonchi or rales.   Chest:      Chest wall: No tenderness.   Abdominal:      Palpations: Abdomen is soft.      Tenderness: There is no abdominal tenderness.   Musculoskeletal:      Cervical back: Normal range of motion and neck supple.   Lymphadenopathy:      Cervical: No cervical adenopathy.   Skin:     General: Skin is warm and dry.      Findings: No rash.   Neurological:      Mental Status: He is alert and oriented to person, place, and time.      Motor: No abnormal muscle tone.   Psychiatric:         Behavior: Behavior normal.         Thought Content: Thought content normal.         Judgment: Judgment  normal.                         Assessment and Plan      Diagnoses and all orders for this visit:    1. Nasal congestion (Primary)  -     loratadine (Claritin) 10 MG tablet; Take 1 tablet by mouth Daily.  Dispense: 30 tablet; Refill: 1  -     benzonatate (Tessalon Perles) 100 MG capsule; Take 1 capsule by mouth 3 (Three) Times a Day As Needed for Cough.  Dispense: 30 capsule; Refill: 1    2. Dry cough  -     loratadine (Claritin) 10 MG tablet; Take 1 tablet by mouth Daily.  Dispense: 30 tablet; Refill: 1  -     benzonatate (Tessalon Perles) 100 MG capsule; Take 1 capsule by mouth 3 (Three) Times a Day As Needed for Cough.  Dispense: 30 capsule; Refill: 1            Follow Up     Return if symptoms worsen or fail to improve.    Patient was given instructions and counseling regarding his condition or for health maintenance advice. Please see specific information pulled into the AVS if appropriate.     -Return if symptoms worsen or do not improve.

## 2022-12-04 RX ORDER — CLOPIDOGREL BISULFATE 75 MG/1
75 TABLET ORAL DAILY
Qty: 90 TABLET | Refills: 0 | Status: SHIPPED | OUTPATIENT
Start: 2022-12-04 | End: 2023-04-05 | Stop reason: SDUPTHER

## 2022-12-16 ENCOUNTER — FLU SHOT (OUTPATIENT)
Dept: FAMILY MEDICINE CLINIC | Facility: CLINIC | Age: 72
End: 2022-12-16

## 2022-12-16 DIAGNOSIS — Z23 NEED FOR INFLUENZA VACCINATION: Primary | ICD-10-CM

## 2022-12-16 PROCEDURE — G0008 ADMIN INFLUENZA VIRUS VAC: HCPCS | Performed by: PHYSICIAN ASSISTANT

## 2022-12-16 PROCEDURE — 90662 IIV NO PRSV INCREASED AG IM: CPT | Performed by: PHYSICIAN ASSISTANT

## 2023-01-01 ENCOUNTER — APPOINTMENT (OUTPATIENT)
Dept: CT IMAGING | Facility: HOSPITAL | Age: 73
DRG: 543 | End: 2023-01-01
Payer: MEDICARE

## 2023-01-01 ENCOUNTER — APPOINTMENT (OUTPATIENT)
Dept: GENERAL RADIOLOGY | Facility: HOSPITAL | Age: 73
DRG: 543 | End: 2023-01-01
Payer: MEDICARE

## 2023-01-01 ENCOUNTER — TELEPHONE (OUTPATIENT)
Dept: FAMILY MEDICINE CLINIC | Facility: CLINIC | Age: 73
End: 2023-01-01

## 2023-01-01 ENCOUNTER — APPOINTMENT (OUTPATIENT)
Dept: MRI IMAGING | Facility: HOSPITAL | Age: 73
DRG: 543 | End: 2023-01-01
Payer: MEDICARE

## 2023-01-01 ENCOUNTER — HOSPITAL ENCOUNTER (INPATIENT)
Facility: HOSPITAL | Age: 73
LOS: 1 days | DRG: 951 | End: 2023-07-18
Attending: STUDENT IN AN ORGANIZED HEALTH CARE EDUCATION/TRAINING PROGRAM | Admitting: STUDENT IN AN ORGANIZED HEALTH CARE EDUCATION/TRAINING PROGRAM
Payer: COMMERCIAL

## 2023-01-01 ENCOUNTER — HOSPITAL ENCOUNTER (INPATIENT)
Facility: HOSPITAL | Age: 73
LOS: 11 days | Discharge: HOSPICE | DRG: 543 | End: 2023-07-17
Attending: EMERGENCY MEDICINE | Admitting: INTERNAL MEDICINE
Payer: MEDICARE

## 2023-01-01 VITALS
TEMPERATURE: 97.8 F | HEART RATE: 111 BPM | HEIGHT: 64 IN | SYSTOLIC BLOOD PRESSURE: 100 MMHG | DIASTOLIC BLOOD PRESSURE: 70 MMHG | RESPIRATION RATE: 20 BRPM | BODY MASS INDEX: 23.56 KG/M2 | OXYGEN SATURATION: 76 % | WEIGHT: 138.01 LBS

## 2023-01-01 DIAGNOSIS — R00.0 TACHYCARDIA: ICD-10-CM

## 2023-01-01 DIAGNOSIS — G89.18 POSTOPERATIVE BACK PAIN: Primary | ICD-10-CM

## 2023-01-01 DIAGNOSIS — D72.829 LEUKOCYTOSIS, UNSPECIFIED TYPE: ICD-10-CM

## 2023-01-01 DIAGNOSIS — M54.9 POSTOPERATIVE BACK PAIN: Primary | ICD-10-CM

## 2023-01-01 LAB
ALBUMIN SERPL ELPH-MCNC: 2.4 G/DL (ref 2.9–4.4)
ALBUMIN SERPL-MCNC: 1.6 G/DL (ref 3.5–5.2)
ALBUMIN SERPL-MCNC: 2.7 G/DL (ref 3.5–5.2)
ALBUMIN SERPL-MCNC: 3.6 G/DL (ref 3.5–5.2)
ALBUMIN/GLOB SERPL: 0.4 G/DL
ALBUMIN/GLOB SERPL: 0.8 G/DL
ALBUMIN/GLOB SERPL: 0.9 {RATIO} (ref 0.7–1.7)
ALBUMIN/GLOB SERPL: 1.1 G/DL
ALP SERPL-CCNC: 133 U/L (ref 39–117)
ALP SERPL-CCNC: 145 U/L (ref 39–117)
ALP SERPL-CCNC: 242 U/L (ref 39–117)
ALPHA1 GLOB SERPL ELPH-MCNC: 0.4 G/DL (ref 0–0.4)
ALPHA2 GLOB SERPL ELPH-MCNC: 1.1 G/DL (ref 0.4–1)
ALT SERPL W P-5'-P-CCNC: 110 U/L (ref 1–41)
ALT SERPL W P-5'-P-CCNC: 14 U/L (ref 1–41)
ALT SERPL W P-5'-P-CCNC: 19 U/L (ref 1–41)
ANION GAP SERPL CALCULATED.3IONS-SCNC: 10 MMOL/L (ref 5–15)
ANION GAP SERPL CALCULATED.3IONS-SCNC: 10 MMOL/L (ref 5–15)
ANION GAP SERPL CALCULATED.3IONS-SCNC: 11 MMOL/L (ref 5–15)
ANION GAP SERPL CALCULATED.3IONS-SCNC: 12.2 MMOL/L (ref 5–15)
ANION GAP SERPL CALCULATED.3IONS-SCNC: 13 MMOL/L (ref 5–15)
ANION GAP SERPL CALCULATED.3IONS-SCNC: 17 MMOL/L (ref 5–15)
ANION GAP SERPL CALCULATED.3IONS-SCNC: 18.6 MMOL/L (ref 5–15)
AST SERPL-CCNC: 10 U/L (ref 1–40)
AST SERPL-CCNC: 102 U/L (ref 1–40)
AST SERPL-CCNC: 13 U/L (ref 1–40)
B-GLOBULIN SERPL ELPH-MCNC: 0.8 G/DL (ref 0.7–1.3)
BACTERIA SPEC AEROBE CULT: NORMAL
BACTERIA SPEC AEROBE CULT: NORMAL
BACTERIA UR QL AUTO: NORMAL /HPF
BASOPHILS # BLD AUTO: 0.12 10*3/MM3 (ref 0–0.2)
BASOPHILS # BLD AUTO: 0.15 10*3/MM3 (ref 0–0.2)
BASOPHILS # BLD MANUAL: 0.18 10*3/MM3 (ref 0–0.2)
BASOPHILS NFR BLD AUTO: 0.5 % (ref 0–1.5)
BASOPHILS NFR BLD AUTO: 0.6 % (ref 0–1.5)
BASOPHILS NFR BLD MANUAL: 1 % (ref 0–1.5)
BILIRUB SERPL-MCNC: 0.4 MG/DL (ref 0–1.2)
BILIRUB UR QL STRIP: NEGATIVE
BUN SERPL-MCNC: 32 MG/DL (ref 8–23)
BUN SERPL-MCNC: 33 MG/DL (ref 8–23)
BUN SERPL-MCNC: 37 MG/DL (ref 8–23)
BUN SERPL-MCNC: 39 MG/DL (ref 8–23)
BUN SERPL-MCNC: 40 MG/DL (ref 8–23)
BUN SERPL-MCNC: 42 MG/DL (ref 8–23)
BUN SERPL-MCNC: 43 MG/DL (ref 8–23)
BUN/CREAT SERPL: 29.4 (ref 7–25)
BUN/CREAT SERPL: 29.5 (ref 7–25)
BUN/CREAT SERPL: 30 (ref 7–25)
BUN/CREAT SERPL: 31.6 (ref 7–25)
BUN/CREAT SERPL: 35.4 (ref 7–25)
BUN/CREAT SERPL: 36.1 (ref 7–25)
BUN/CREAT SERPL: 36.4 (ref 7–25)
BURR CELLS BLD QL SMEAR: ABNORMAL
CALCIUM SPEC-SCNC: 10.2 MG/DL (ref 8.6–10.5)
CALCIUM SPEC-SCNC: 9.4 MG/DL (ref 8.6–10.5)
CALCIUM SPEC-SCNC: 9.5 MG/DL (ref 8.6–10.5)
CALCIUM SPEC-SCNC: 9.6 MG/DL (ref 8.6–10.5)
CALCIUM SPEC-SCNC: 9.7 MG/DL (ref 8.6–10.5)
CANCER AG19-9 SERPL-ACNC: 79.2 U/ML
CEA SERPL-MCNC: 3.37 NG/ML
CHLORIDE SERPL-SCNC: 101 MMOL/L (ref 98–107)
CHLORIDE SERPL-SCNC: 106 MMOL/L (ref 98–107)
CHLORIDE SERPL-SCNC: 106 MMOL/L (ref 98–107)
CHLORIDE SERPL-SCNC: 107 MMOL/L (ref 98–107)
CHLORIDE SERPL-SCNC: 108 MMOL/L (ref 98–107)
CHLORIDE SERPL-SCNC: 109 MMOL/L (ref 98–107)
CHLORIDE SERPL-SCNC: 109 MMOL/L (ref 98–107)
CLARITY UR: ABNORMAL
CO2 SERPL-SCNC: 15 MMOL/L (ref 22–29)
CO2 SERPL-SCNC: 17 MMOL/L (ref 22–29)
CO2 SERPL-SCNC: 17 MMOL/L (ref 22–29)
CO2 SERPL-SCNC: 17.4 MMOL/L (ref 22–29)
CO2 SERPL-SCNC: 18 MMOL/L (ref 22–29)
CO2 SERPL-SCNC: 18.8 MMOL/L (ref 22–29)
CO2 SERPL-SCNC: 19 MMOL/L (ref 22–29)
COLOR UR: YELLOW
CREAT SERPL-MCNC: 1.07 MG/DL (ref 0.76–1.27)
CREAT SERPL-MCNC: 1.09 MG/DL (ref 0.76–1.27)
CREAT SERPL-MCNC: 1.12 MG/DL (ref 0.76–1.27)
CREAT SERPL-MCNC: 1.13 MG/DL (ref 0.76–1.27)
CREAT SERPL-MCNC: 1.17 MG/DL (ref 0.76–1.27)
CREAT SERPL-MCNC: 1.19 MG/DL (ref 0.76–1.27)
CREAT SERPL-MCNC: 1.4 MG/DL (ref 0.76–1.27)
CRP SERPL-MCNC: 33.23 MG/DL (ref 0–0.5)
D-LACTATE SERPL-SCNC: 2 MMOL/L (ref 0.5–2)
DEPRECATED RDW RBC AUTO: 41.8 FL (ref 37–54)
DEPRECATED RDW RBC AUTO: 42.9 FL (ref 37–54)
DEPRECATED RDW RBC AUTO: 43.2 FL (ref 37–54)
DEPRECATED RDW RBC AUTO: 43.9 FL (ref 37–54)
DEPRECATED RDW RBC AUTO: 44.1 FL (ref 37–54)
DEPRECATED RDW RBC AUTO: 44.6 FL (ref 37–54)
EGFRCR SERPLBLD CKD-EPI 2021: 53.4 ML/MIN/1.73
EGFRCR SERPLBLD CKD-EPI 2021: 64.9 ML/MIN/1.73
EGFRCR SERPLBLD CKD-EPI 2021: 66.2 ML/MIN/1.73
EGFRCR SERPLBLD CKD-EPI 2021: 69.1 ML/MIN/1.73
EGFRCR SERPLBLD CKD-EPI 2021: 69.8 ML/MIN/1.73
EGFRCR SERPLBLD CKD-EPI 2021: 72.1 ML/MIN/1.73
EGFRCR SERPLBLD CKD-EPI 2021: 73.7 ML/MIN/1.73
EOSINOPHIL # BLD AUTO: 2.45 10*3/MM3 (ref 0–0.4)
EOSINOPHIL # BLD AUTO: 3.09 10*3/MM3 (ref 0–0.4)
EOSINOPHIL # BLD MANUAL: 0.94 10*3/MM3 (ref 0–0.4)
EOSINOPHIL # BLD MANUAL: 3.54 10*3/MM3 (ref 0–0.4)
EOSINOPHIL # BLD MANUAL: 3.95 10*3/MM3 (ref 0–0.4)
EOSINOPHIL NFR BLD AUTO: 11.1 % (ref 0.3–6.2)
EOSINOPHIL NFR BLD AUTO: 11.5 % (ref 0.3–6.2)
EOSINOPHIL NFR BLD MANUAL: 12.2 % (ref 0.3–6.2)
EOSINOPHIL NFR BLD MANUAL: 16.2 % (ref 0.3–6.2)
EOSINOPHIL NFR BLD MANUAL: 5.2 % (ref 0.3–6.2)
ERYTHROCYTE [DISTWIDTH] IN BLOOD BY AUTOMATED COUNT: 12.8 % (ref 12.3–15.4)
ERYTHROCYTE [DISTWIDTH] IN BLOOD BY AUTOMATED COUNT: 12.9 % (ref 12.3–15.4)
ERYTHROCYTE [DISTWIDTH] IN BLOOD BY AUTOMATED COUNT: 13.1 % (ref 12.3–15.4)
ERYTHROCYTE [DISTWIDTH] IN BLOOD BY AUTOMATED COUNT: 13.2 % (ref 12.3–15.4)
ERYTHROCYTE [DISTWIDTH] IN BLOOD BY AUTOMATED COUNT: 13.5 % (ref 12.3–15.4)
ERYTHROCYTE [DISTWIDTH] IN BLOOD BY AUTOMATED COUNT: 13.6 % (ref 12.3–15.4)
ERYTHROCYTE [SEDIMENTATION RATE] IN BLOOD: 77 MM/HR (ref 0–20)
GAMMA GLOB SERPL ELPH-MCNC: 0.7 G/DL (ref 0.4–1.8)
GLOBULIN SER-MCNC: 3 G/DL (ref 2.2–3.9)
GLOBULIN UR ELPH-MCNC: 3.3 GM/DL
GLOBULIN UR ELPH-MCNC: 3.5 GM/DL
GLOBULIN UR ELPH-MCNC: 4.1 GM/DL
GLUCOSE BLDC GLUCOMTR-MCNC: 160 MG/DL (ref 70–130)
GLUCOSE SERPL-MCNC: 106 MG/DL (ref 65–99)
GLUCOSE SERPL-MCNC: 112 MG/DL (ref 65–99)
GLUCOSE SERPL-MCNC: 113 MG/DL (ref 65–99)
GLUCOSE SERPL-MCNC: 117 MG/DL (ref 65–99)
GLUCOSE SERPL-MCNC: 121 MG/DL (ref 65–99)
GLUCOSE SERPL-MCNC: 128 MG/DL (ref 65–99)
GLUCOSE SERPL-MCNC: 185 MG/DL (ref 65–99)
GLUCOSE UR STRIP-MCNC: NEGATIVE MG/DL
GRAN CASTS URNS QL MICRO: NORMAL /LPF
HCT VFR BLD AUTO: 32.7 % (ref 37.5–51)
HCT VFR BLD AUTO: 32.9 % (ref 37.5–51)
HCT VFR BLD AUTO: 37.7 % (ref 37.5–51)
HCT VFR BLD AUTO: 38.3 % (ref 37.5–51)
HCT VFR BLD AUTO: 38.3 % (ref 37.5–51)
HCT VFR BLD AUTO: 43.7 % (ref 37.5–51)
HGB BLD-MCNC: 10.9 G/DL (ref 13–17.7)
HGB BLD-MCNC: 11.3 G/DL (ref 13–17.7)
HGB BLD-MCNC: 12.8 G/DL (ref 13–17.7)
HGB BLD-MCNC: 13 G/DL (ref 13–17.7)
HGB BLD-MCNC: 13.5 G/DL (ref 13–17.7)
HGB BLD-MCNC: 15.2 G/DL (ref 13–17.7)
HGB UR QL STRIP.AUTO: NEGATIVE
HYALINE CASTS UR QL AUTO: NORMAL /LPF
IGA SERPL-MCNC: 187 MG/DL (ref 61–437)
IGG SERPL-MCNC: 564 MG/DL (ref 603–1613)
IGM SERPL-MCNC: 218 MG/DL (ref 15–143)
IMM GRANULOCYTES # BLD AUTO: 0.54 10*3/MM3 (ref 0–0.05)
IMM GRANULOCYTES # BLD AUTO: 0.57 10*3/MM3 (ref 0–0.05)
IMM GRANULOCYTES NFR BLD AUTO: 2 % (ref 0–0.5)
IMM GRANULOCYTES NFR BLD AUTO: 2.6 % (ref 0–0.5)
INR PPP: 1.4 (ref 0.9–1.1)
INTERPRETATION SERPL IEP-IMP: ABNORMAL
KAPPA LC FREE SER-MCNC: 37.5 MG/L (ref 3.3–19.4)
KAPPA LC FREE/LAMBDA FREE SER: 1.28 {RATIO} (ref 0.26–1.65)
KETONES UR QL STRIP: ABNORMAL
LAB AP CASE REPORT: NORMAL
LAB AP CLINICAL INFORMATION: NORMAL
LAB AP DIAGNOSIS COMMENT: NORMAL
LABORATORY COMMENT REPORT: ABNORMAL
LAMBDA LC FREE SERPL-MCNC: 29.4 MG/L (ref 5.7–26.3)
LEUKOCYTE ESTERASE UR QL STRIP.AUTO: NEGATIVE
LYMPHOCYTES # BLD AUTO: 1.55 10*3/MM3 (ref 0.7–3.1)
LYMPHOCYTES # BLD AUTO: 1.81 10*3/MM3 (ref 0.7–3.1)
LYMPHOCYTES # BLD MANUAL: 0.22 10*3/MM3 (ref 0.7–3.1)
LYMPHOCYTES # BLD MANUAL: 1.13 10*3/MM3 (ref 0.7–3.1)
LYMPHOCYTES # BLD MANUAL: 1.65 10*3/MM3 (ref 0.7–3.1)
LYMPHOCYTES NFR BLD AUTO: 6.7 % (ref 19.6–45.3)
LYMPHOCYTES NFR BLD AUTO: 7 % (ref 19.6–45.3)
LYMPHOCYTES NFR BLD MANUAL: 2.1 % (ref 5–12)
LYMPHOCYTES NFR BLD MANUAL: 5.1 % (ref 5–12)
LYMPHOCYTES NFR BLD MANUAL: 7.1 % (ref 5–12)
M PROTEIN SERPL ELPH-MCNC: ABNORMAL G/DL
MAGNESIUM SERPL-MCNC: 2.3 MG/DL (ref 1.6–2.4)
MCH RBC QN AUTO: 30.7 PG (ref 26.6–33)
MCH RBC QN AUTO: 30.8 PG (ref 26.6–33)
MCH RBC QN AUTO: 31 PG (ref 26.6–33)
MCH RBC QN AUTO: 31.1 PG (ref 26.6–33)
MCH RBC QN AUTO: 31.2 PG (ref 26.6–33)
MCH RBC QN AUTO: 31.5 PG (ref 26.6–33)
MCHC RBC AUTO-ENTMCNC: 33.1 G/DL (ref 31.5–35.7)
MCHC RBC AUTO-ENTMCNC: 33.4 G/DL (ref 31.5–35.7)
MCHC RBC AUTO-ENTMCNC: 34.5 G/DL (ref 31.5–35.7)
MCHC RBC AUTO-ENTMCNC: 34.6 G/DL (ref 31.5–35.7)
MCHC RBC AUTO-ENTMCNC: 34.8 G/DL (ref 31.5–35.7)
MCHC RBC AUTO-ENTMCNC: 35.2 G/DL (ref 31.5–35.7)
MCV RBC AUTO: 89.1 FL (ref 79–97)
MCV RBC AUTO: 89.2 FL (ref 79–97)
MCV RBC AUTO: 89.3 FL (ref 79–97)
MCV RBC AUTO: 90.4 FL (ref 79–97)
MCV RBC AUTO: 91.8 FL (ref 79–97)
MCV RBC AUTO: 93.7 FL (ref 79–97)
MONOCYTES # BLD AUTO: 1.83 10*3/MM3 (ref 0.1–0.9)
MONOCYTES # BLD AUTO: 1.97 10*3/MM3 (ref 0.1–0.9)
MONOCYTES # BLD: 0.38 10*3/MM3 (ref 0.1–0.9)
MONOCYTES # BLD: 1.55 10*3/MM3 (ref 0.1–0.9)
MONOCYTES # BLD: 1.65 10*3/MM3 (ref 0.1–0.9)
MONOCYTES NFR BLD AUTO: 7.3 % (ref 5–12)
MONOCYTES NFR BLD AUTO: 8.3 % (ref 5–12)
NEUTROPHILS # BLD AUTO: 15.54 10*3/MM3 (ref 1.7–7)
NEUTROPHILS # BLD AUTO: 16.58 10*3/MM3 (ref 1.7–7)
NEUTROPHILS # BLD AUTO: 25.13 10*3/MM3 (ref 1.7–7)
NEUTROPHILS NFR BLD AUTO: 15.54 10*3/MM3 (ref 1.7–7)
NEUTROPHILS NFR BLD AUTO: 19.29 10*3/MM3 (ref 1.7–7)
NEUTROPHILS NFR BLD AUTO: 70.5 % (ref 42.7–76)
NEUTROPHILS NFR BLD AUTO: 71.9 % (ref 42.7–76)
NEUTROPHILS NFR BLD MANUAL: 75.8 % (ref 42.7–76)
NEUTROPHILS NFR BLD MANUAL: 77.6 % (ref 42.7–76)
NEUTROPHILS NFR BLD MANUAL: 85.6 % (ref 42.7–76)
NITRITE UR QL STRIP: NEGATIVE
NRBC BLD AUTO-RTO: 0 /100 WBC (ref 0–0.2)
PATH REPORT.FINAL DX SPEC: NORMAL
PATH REPORT.GROSS SPEC: NORMAL
PH UR STRIP.AUTO: <=5 [PH] (ref 5–8)
PHOSPHATE SERPL-MCNC: 3.8 MG/DL (ref 2.5–4.5)
PLAT MORPH BLD: NORMAL
PLATELET # BLD AUTO: 190 10*3/MM3 (ref 140–450)
PLATELET # BLD AUTO: 197 10*3/MM3 (ref 140–450)
PLATELET # BLD AUTO: 203 10*3/MM3 (ref 140–450)
PLATELET # BLD AUTO: 207 10*3/MM3 (ref 140–450)
PLATELET # BLD AUTO: 208 10*3/MM3 (ref 140–450)
PLATELET # BLD AUTO: 241 10*3/MM3 (ref 140–450)
PMV BLD AUTO: 9 FL (ref 6–12)
PMV BLD AUTO: 9 FL (ref 6–12)
PMV BLD AUTO: 9.1 FL (ref 6–12)
PMV BLD AUTO: 9.2 FL (ref 6–12)
PMV BLD AUTO: 9.3 FL (ref 6–12)
PMV BLD AUTO: 9.5 FL (ref 6–12)
POIKILOCYTOSIS BLD QL SMEAR: ABNORMAL
POIKILOCYTOSIS BLD QL SMEAR: ABNORMAL
POTASSIUM SERPL-SCNC: 4 MMOL/L (ref 3.5–5.2)
POTASSIUM SERPL-SCNC: 4.1 MMOL/L (ref 3.5–5.2)
POTASSIUM SERPL-SCNC: 4.2 MMOL/L (ref 3.5–5.2)
POTASSIUM SERPL-SCNC: 4.3 MMOL/L (ref 3.5–5.2)
POTASSIUM SERPL-SCNC: 4.4 MMOL/L (ref 3.5–5.2)
POTASSIUM SERPL-SCNC: 4.5 MMOL/L (ref 3.5–5.2)
POTASSIUM SERPL-SCNC: 4.5 MMOL/L (ref 3.5–5.2)
PROCALCITONIN SERPL-MCNC: 0.39 NG/ML (ref 0–0.25)
PROT SERPL-MCNC: 5.4 G/DL (ref 6–8.5)
PROT SERPL-MCNC: 5.7 G/DL (ref 6–8.5)
PROT SERPL-MCNC: 6.2 G/DL (ref 6–8.5)
PROT SERPL-MCNC: 6.9 G/DL (ref 6–8.5)
PROT UR QL STRIP: ABNORMAL
PROTHROMBIN TIME: 17.3 SECONDS (ref 11.7–14.2)
PSA SERPL-MCNC: 0.34 NG/ML (ref 0–4)
QT INTERVAL: 299 MS
RBC # BLD AUTO: 3.51 10*6/MM3 (ref 4.14–5.8)
RBC # BLD AUTO: 3.67 10*6/MM3 (ref 4.14–5.8)
RBC # BLD AUTO: 4.17 10*6/MM3 (ref 4.14–5.8)
RBC # BLD AUTO: 4.17 10*6/MM3 (ref 4.14–5.8)
RBC # BLD AUTO: 4.29 10*6/MM3 (ref 4.14–5.8)
RBC # BLD AUTO: 4.9 10*6/MM3 (ref 4.14–5.8)
RBC # UR STRIP: NORMAL /HPF
RBC MORPH BLD: NORMAL
REF LAB TEST METHOD: NORMAL
SODIUM SERPL-SCNC: 134 MMOL/L (ref 136–145)
SODIUM SERPL-SCNC: 134 MMOL/L (ref 136–145)
SODIUM SERPL-SCNC: 137 MMOL/L (ref 136–145)
SODIUM SERPL-SCNC: 137 MMOL/L (ref 136–145)
SODIUM SERPL-SCNC: 138 MMOL/L (ref 136–145)
SODIUM SERPL-SCNC: 139 MMOL/L (ref 136–145)
SODIUM SERPL-SCNC: 141 MMOL/L (ref 136–145)
SP GR UR STRIP: >=1.03 (ref 1–1.03)
SQUAMOUS #/AREA URNS HPF: NORMAL /HPF
TSH SERPL DL<=0.05 MIU/L-ACNC: 1.9 UIU/ML (ref 0.27–4.2)
UROBILINOGEN UR QL STRIP: ABNORMAL
VARIANT LYMPHS NFR BLD MANUAL: 1 % (ref 19.6–45.3)
VARIANT LYMPHS NFR BLD MANUAL: 5.1 % (ref 19.6–45.3)
VARIANT LYMPHS NFR BLD MANUAL: 6.2 % (ref 19.6–45.3)
WBC # UR STRIP: NORMAL /HPF
WBC MORPH BLD: NORMAL
WBC NRBC COR # BLD: 18.15 10*3/MM3 (ref 3.4–10.8)
WBC NRBC COR # BLD: 19.2 10*3/MM3 (ref 3.4–10.8)
WBC NRBC COR # BLD: 21.87 10*3/MM3 (ref 3.4–10.8)
WBC NRBC COR # BLD: 22.06 10*3/MM3 (ref 3.4–10.8)
WBC NRBC COR # BLD: 26.85 10*3/MM3 (ref 3.4–10.8)
WBC NRBC COR # BLD: 32.39 10*3/MM3 (ref 3.4–10.8)

## 2023-01-01 PROCEDURE — 25010000002 HYDROMORPHONE 1 MG/ML SOLUTION: Performed by: STUDENT IN AN ORGANIZED HEALTH CARE EDUCATION/TRAINING PROGRAM

## 2023-01-01 PROCEDURE — 25010000002 LORAZEPAM PER 2 MG: Performed by: NURSE PRACTITIONER

## 2023-01-01 PROCEDURE — 87040 BLOOD CULTURE FOR BACTERIA: CPT | Performed by: EMERGENCY MEDICINE

## 2023-01-01 PROCEDURE — 85025 COMPLETE CBC W/AUTO DIFF WBC: CPT | Performed by: INTERNAL MEDICINE

## 2023-01-01 PROCEDURE — 72158 MRI LUMBAR SPINE W/O & W/DYE: CPT

## 2023-01-01 PROCEDURE — 63710000001 DEXAMETHASONE PER 0.25 MG: Performed by: INTERNAL MEDICINE

## 2023-01-01 PROCEDURE — 0 HYDROMORPHONE HCL PF 50 MG/5ML SOLUTION: Performed by: STUDENT IN AN ORGANIZED HEALTH CARE EDUCATION/TRAINING PROGRAM

## 2023-01-01 PROCEDURE — 74177 CT ABD & PELVIS W/CONTRAST: CPT

## 2023-01-01 PROCEDURE — 72157 MRI CHEST SPINE W/O & W/DYE: CPT

## 2023-01-01 PROCEDURE — 99222 1ST HOSP IP/OBS MODERATE 55: CPT | Performed by: RADIOLOGY

## 2023-01-01 PROCEDURE — 99232 SBSQ HOSP IP/OBS MODERATE 35: CPT | Performed by: INTERNAL MEDICINE

## 2023-01-01 PROCEDURE — 82378 CARCINOEMBRYONIC ANTIGEN: CPT | Performed by: INTERNAL MEDICINE

## 2023-01-01 PROCEDURE — 99232 SBSQ HOSP IP/OBS MODERATE 35: CPT | Performed by: NURSE PRACTITIONER

## 2023-01-01 PROCEDURE — 81001 URINALYSIS AUTO W/SCOPE: CPT | Performed by: INTERNAL MEDICINE

## 2023-01-01 PROCEDURE — 80048 BASIC METABOLIC PNL TOTAL CA: CPT | Performed by: STUDENT IN AN ORGANIZED HEALTH CARE EDUCATION/TRAINING PROGRAM

## 2023-01-01 PROCEDURE — 25010000002 HYDROMORPHONE PER 4 MG: Performed by: NURSE PRACTITIONER

## 2023-01-01 PROCEDURE — 83605 ASSAY OF LACTIC ACID: CPT | Performed by: EMERGENCY MEDICINE

## 2023-01-01 PROCEDURE — 25510000001 IOPAMIDOL 61 % SOLUTION: Performed by: STUDENT IN AN ORGANIZED HEALTH CARE EDUCATION/TRAINING PROGRAM

## 2023-01-01 PROCEDURE — 86334 IMMUNOFIX E-PHORESIS SERUM: CPT | Performed by: INTERNAL MEDICINE

## 2023-01-01 PROCEDURE — 0 GADOBENATE DIMEGLUMINE 529 MG/ML SOLUTION: Performed by: EMERGENCY MEDICINE

## 2023-01-01 PROCEDURE — 25010000002 LORAZEPAM PER 2 MG: Performed by: STUDENT IN AN ORGANIZED HEALTH CARE EDUCATION/TRAINING PROGRAM

## 2023-01-01 PROCEDURE — 80053 COMPREHEN METABOLIC PANEL: CPT | Performed by: INTERNAL MEDICINE

## 2023-01-01 PROCEDURE — 99233 SBSQ HOSP IP/OBS HIGH 50: CPT | Performed by: INTERNAL MEDICINE

## 2023-01-01 PROCEDURE — 25010000002 KETOROLAC TROMETHAMINE PER 15 MG: Performed by: INTERNAL MEDICINE

## 2023-01-01 PROCEDURE — 99221 1ST HOSP IP/OBS SF/LOW 40: CPT | Performed by: NURSE PRACTITIONER

## 2023-01-01 PROCEDURE — 85007 BL SMEAR W/DIFF WBC COUNT: CPT | Performed by: INTERNAL MEDICINE

## 2023-01-01 PROCEDURE — 25010000002 HYDROMORPHONE PER 4 MG: Performed by: STUDENT IN AN ORGANIZED HEALTH CARE EDUCATION/TRAINING PROGRAM

## 2023-01-01 PROCEDURE — 25010000002 HYDROMORPHONE PER 4 MG: Performed by: EMERGENCY MEDICINE

## 2023-01-01 PROCEDURE — G0378 HOSPITAL OBSERVATION PER HR: HCPCS

## 2023-01-01 PROCEDURE — 99285 EMERGENCY DEPT VISIT HI MDM: CPT

## 2023-01-01 PROCEDURE — 85007 BL SMEAR W/DIFF WBC COUNT: CPT | Performed by: EMERGENCY MEDICINE

## 2023-01-01 PROCEDURE — 88341 IMHCHEM/IMCYTCHM EA ADD ANTB: CPT | Performed by: INTERNAL MEDICINE

## 2023-01-01 PROCEDURE — 80048 BASIC METABOLIC PNL TOTAL CA: CPT | Performed by: INTERNAL MEDICINE

## 2023-01-01 PROCEDURE — 99221 1ST HOSP IP/OBS SF/LOW 40: CPT | Performed by: ORTHOPAEDIC SURGERY

## 2023-01-01 PROCEDURE — 85610 PROTHROMBIN TIME: CPT | Performed by: STUDENT IN AN ORGANIZED HEALTH CARE EDUCATION/TRAINING PROGRAM

## 2023-01-01 PROCEDURE — 88360 TUMOR IMMUNOHISTOCHEM/MANUAL: CPT | Performed by: INTERNAL MEDICINE

## 2023-01-01 PROCEDURE — 80053 COMPREHEN METABOLIC PANEL: CPT | Performed by: EMERGENCY MEDICINE

## 2023-01-01 PROCEDURE — 36415 COLL VENOUS BLD VENIPUNCTURE: CPT

## 2023-01-01 PROCEDURE — 82948 REAGENT STRIP/BLOOD GLUCOSE: CPT

## 2023-01-01 PROCEDURE — 88305 TISSUE EXAM BY PATHOLOGIST: CPT | Performed by: INTERNAL MEDICINE

## 2023-01-01 PROCEDURE — A9577 INJ MULTIHANCE: HCPCS | Performed by: EMERGENCY MEDICINE

## 2023-01-01 PROCEDURE — 84100 ASSAY OF PHOSPHORUS: CPT | Performed by: INTERNAL MEDICINE

## 2023-01-01 PROCEDURE — 84145 PROCALCITONIN (PCT): CPT | Performed by: EMERGENCY MEDICINE

## 2023-01-01 PROCEDURE — 25010000002 ONDANSETRON PER 1 MG: Performed by: EMERGENCY MEDICINE

## 2023-01-01 PROCEDURE — 83735 ASSAY OF MAGNESIUM: CPT | Performed by: INTERNAL MEDICINE

## 2023-01-01 PROCEDURE — 71045 X-RAY EXAM CHEST 1 VIEW: CPT

## 2023-01-01 PROCEDURE — 93010 ELECTROCARDIOGRAM REPORT: CPT | Performed by: INTERNAL MEDICINE

## 2023-01-01 PROCEDURE — 88342 IMHCHEM/IMCYTCHM 1ST ANTB: CPT | Performed by: INTERNAL MEDICINE

## 2023-01-01 PROCEDURE — 83521 IG LIGHT CHAINS FREE EACH: CPT | Performed by: INTERNAL MEDICINE

## 2023-01-01 PROCEDURE — 25010000002 HYDROMORPHONE PER 4 MG: Performed by: INTERNAL MEDICINE

## 2023-01-01 PROCEDURE — 99024 POSTOP FOLLOW-UP VISIT: CPT | Performed by: NURSE PRACTITIONER

## 2023-01-01 PROCEDURE — 84153 ASSAY OF PSA TOTAL: CPT | Performed by: INTERNAL MEDICINE

## 2023-01-01 PROCEDURE — 84443 ASSAY THYROID STIM HORMONE: CPT | Performed by: INTERNAL MEDICINE

## 2023-01-01 PROCEDURE — 25010000002 ONDANSETRON PER 1 MG: Performed by: INTERNAL MEDICINE

## 2023-01-01 PROCEDURE — 85652 RBC SED RATE AUTOMATED: CPT | Performed by: ORTHOPAEDIC SURGERY

## 2023-01-01 PROCEDURE — 86140 C-REACTIVE PROTEIN: CPT | Performed by: ORTHOPAEDIC SURGERY

## 2023-01-01 PROCEDURE — 25010000002 HYDROMORPHONE 1 MG/ML SOLUTION: Performed by: EMERGENCY MEDICINE

## 2023-01-01 PROCEDURE — 93005 ELECTROCARDIOGRAM TRACING: CPT | Performed by: NURSE PRACTITIONER

## 2023-01-01 PROCEDURE — 82784 ASSAY IGA/IGD/IGG/IGM EACH: CPT | Performed by: INTERNAL MEDICINE

## 2023-01-01 PROCEDURE — 25010000002 HYDROMORPHONE 1 MG/ML SOLUTION: Performed by: NURSE PRACTITIONER

## 2023-01-01 PROCEDURE — 71260 CT THORAX DX C+: CPT

## 2023-01-01 PROCEDURE — 99223 1ST HOSP IP/OBS HIGH 75: CPT | Performed by: INTERNAL MEDICINE

## 2023-01-01 PROCEDURE — 84165 PROTEIN E-PHORESIS SERUM: CPT | Performed by: INTERNAL MEDICINE

## 2023-01-01 PROCEDURE — 0 LIDOCAINE 1 % SOLUTION: Performed by: RADIOLOGY

## 2023-01-01 PROCEDURE — 85027 COMPLETE CBC AUTOMATED: CPT | Performed by: STUDENT IN AN ORGANIZED HEALTH CARE EDUCATION/TRAINING PROGRAM

## 2023-01-01 PROCEDURE — 85025 COMPLETE CBC W/AUTO DIFF WBC: CPT | Performed by: EMERGENCY MEDICINE

## 2023-01-01 PROCEDURE — 86301 IMMUNOASSAY TUMOR CA 19-9: CPT | Performed by: INTERNAL MEDICINE

## 2023-01-01 PROCEDURE — 73562 X-RAY EXAM OF KNEE 3: CPT

## 2023-01-01 PROCEDURE — 0BBJ3ZX EXCISION OF LEFT LOWER LUNG LOBE, PERCUTANEOUS APPROACH, DIAGNOSTIC: ICD-10-PCS | Performed by: RADIOLOGY

## 2023-01-01 RX ORDER — LORAZEPAM 2 MG/ML
1 INJECTION INTRAMUSCULAR
Status: DISCONTINUED | OUTPATIENT
Start: 2023-01-01 | End: 2023-07-18 | Stop reason: HOSPADM

## 2023-01-01 RX ORDER — GLYCOPYRROLATE 0.2 MG/ML
0.4 INJECTION INTRAMUSCULAR; INTRAVENOUS
Status: DISCONTINUED | OUTPATIENT
Start: 2023-01-01 | End: 2023-01-01 | Stop reason: HOSPADM

## 2023-01-01 RX ORDER — PREGABALIN 50 MG/1
50 CAPSULE ORAL EVERY 12 HOURS SCHEDULED
Status: DISCONTINUED | OUTPATIENT
Start: 2023-01-01 | End: 2023-01-01

## 2023-01-01 RX ORDER — METOPROLOL SUCCINATE 50 MG/1
50 TABLET, EXTENDED RELEASE ORAL DAILY
Status: DISCONTINUED | OUTPATIENT
Start: 2023-01-01 | End: 2023-01-01

## 2023-01-01 RX ORDER — SODIUM CHLORIDE 9 MG/ML
30 INJECTION, SOLUTION INTRAVENOUS CONTINUOUS PRN
Status: DISCONTINUED | OUTPATIENT
Start: 2023-01-01 | End: 2023-01-01

## 2023-01-01 RX ORDER — ACETAMINOPHEN 325 MG/1
650 TABLET ORAL EVERY 4 HOURS PRN
Status: DISCONTINUED | OUTPATIENT
Start: 2023-01-01 | End: 2023-01-01 | Stop reason: HOSPADM

## 2023-01-01 RX ORDER — LORAZEPAM 2 MG/ML
1 INJECTION INTRAMUSCULAR
Status: DISCONTINUED | OUTPATIENT
Start: 2023-01-01 | End: 2023-01-01 | Stop reason: HOSPADM

## 2023-01-01 RX ORDER — ACETAMINOPHEN 160 MG/5ML
650 SOLUTION ORAL EVERY 4 HOURS PRN
Status: CANCELLED | OUTPATIENT
Start: 2023-01-01

## 2023-01-01 RX ORDER — DIPHENOXYLATE HYDROCHLORIDE AND ATROPINE SULFATE 2.5; .025 MG/1; MG/1
1 TABLET ORAL
Status: CANCELLED | OUTPATIENT
Start: 2023-01-01 | End: 2023-07-26

## 2023-01-01 RX ORDER — LORAZEPAM 2 MG/ML
2 INJECTION INTRAMUSCULAR
Status: CANCELLED | OUTPATIENT
Start: 2023-01-01 | End: 2023-07-26

## 2023-01-01 RX ORDER — HYDROCODONE BITARTRATE AND ACETAMINOPHEN 5; 325 MG/1; MG/1
1 TABLET ORAL EVERY 6 HOURS PRN
Qty: 40 TABLET | Refills: 0 | Status: CANCELLED | OUTPATIENT
Start: 2023-01-01

## 2023-01-01 RX ORDER — NALOXONE HCL 0.4 MG/ML
0.1 VIAL (ML) INJECTION
Status: DISCONTINUED | OUTPATIENT
Start: 2023-01-01 | End: 2023-01-01

## 2023-01-01 RX ORDER — LORAZEPAM 2 MG/ML
2 CONCENTRATE ORAL
Status: CANCELLED | OUTPATIENT
Start: 2023-01-01 | End: 2023-07-26

## 2023-01-01 RX ORDER — HYDROMORPHONE HYDROCHLORIDE 2 MG/ML
3 INJECTION, SOLUTION INTRAMUSCULAR; INTRAVENOUS; SUBCUTANEOUS
Status: DISCONTINUED | OUTPATIENT
Start: 2023-01-01 | End: 2023-01-01 | Stop reason: HOSPADM

## 2023-01-01 RX ORDER — MORPHINE SULFATE 15 MG/1
15 TABLET ORAL EVERY 4 HOURS PRN
Status: DISCONTINUED | OUTPATIENT
Start: 2023-01-01 | End: 2023-01-01

## 2023-01-01 RX ORDER — MORPHINE SULFATE 20 MG/ML
20 SOLUTION ORAL
Status: DISCONTINUED | OUTPATIENT
Start: 2023-01-01 | End: 2023-07-18 | Stop reason: HOSPADM

## 2023-01-01 RX ORDER — HYDROMORPHONE HYDROCHLORIDE 2 MG/ML
3 INJECTION, SOLUTION INTRAMUSCULAR; INTRAVENOUS; SUBCUTANEOUS
Status: CANCELLED | OUTPATIENT
Start: 2023-01-01 | End: 2023-07-24

## 2023-01-01 RX ORDER — LORAZEPAM 2 MG/ML
0.5 INJECTION INTRAMUSCULAR
Status: CANCELLED | OUTPATIENT
Start: 2023-01-01 | End: 2023-07-26

## 2023-01-01 RX ORDER — DIPHENOXYLATE HYDROCHLORIDE AND ATROPINE SULFATE 2.5; .025 MG/1; MG/1
1 TABLET ORAL
Status: DISCONTINUED | OUTPATIENT
Start: 2023-01-01 | End: 2023-01-01 | Stop reason: HOSPADM

## 2023-01-01 RX ORDER — MORPHINE SULFATE 20 MG/ML
10 SOLUTION ORAL
Status: DISCONTINUED | OUTPATIENT
Start: 2023-01-01 | End: 2023-07-18 | Stop reason: HOSPADM

## 2023-01-01 RX ORDER — AMOXICILLIN 250 MG
2 CAPSULE ORAL 2 TIMES DAILY
Status: DISCONTINUED | OUTPATIENT
Start: 2023-01-01 | End: 2023-01-01

## 2023-01-01 RX ORDER — GLYCOPYRROLATE 0.2 MG/ML
0.4 INJECTION INTRAMUSCULAR; INTRAVENOUS
Status: DISCONTINUED | OUTPATIENT
Start: 2023-01-01 | End: 2023-07-18 | Stop reason: HOSPADM

## 2023-01-01 RX ORDER — SCOLOPAMINE TRANSDERMAL SYSTEM 1 MG/1
1 PATCH, EXTENDED RELEASE TRANSDERMAL
Status: CANCELLED | OUTPATIENT
Start: 2023-01-01

## 2023-01-01 RX ORDER — ATROPINE SULFATE 10 MG/ML
2 SOLUTION/ DROPS OPHTHALMIC 2 TIMES DAILY PRN
Status: DISCONTINUED | OUTPATIENT
Start: 2023-01-01 | End: 2023-01-01 | Stop reason: HOSPADM

## 2023-01-01 RX ORDER — UREA 10 %
1 LOTION (ML) TOPICAL NIGHTLY PRN
Status: DISCONTINUED | OUTPATIENT
Start: 2023-01-01 | End: 2023-01-01

## 2023-01-01 RX ORDER — MORPHINE SULFATE 10 MG/ML
6 INJECTION INTRAMUSCULAR; INTRAVENOUS; SUBCUTANEOUS
Status: CANCELLED | OUTPATIENT
Start: 2023-01-01 | End: 2023-07-26

## 2023-01-01 RX ORDER — LORAZEPAM 2 MG/ML
1 INJECTION INTRAMUSCULAR
Status: CANCELLED | OUTPATIENT
Start: 2023-01-01 | End: 2023-07-26

## 2023-01-01 RX ORDER — LORAZEPAM 2 MG/ML
1 INJECTION INTRAMUSCULAR ONCE
Status: COMPLETED | OUTPATIENT
Start: 2023-01-01 | End: 2023-01-01

## 2023-01-01 RX ORDER — DEXAMETHASONE 4 MG/1
4 TABLET ORAL EVERY 8 HOURS SCHEDULED
Status: DISCONTINUED | OUTPATIENT
Start: 2023-01-01 | End: 2023-01-01

## 2023-01-01 RX ORDER — LORAZEPAM 0.5 MG/1
0.5 TABLET ORAL EVERY 6 HOURS PRN
Status: DISPENSED | OUTPATIENT
Start: 2023-01-01 | End: 2023-01-01

## 2023-01-01 RX ORDER — LORAZEPAM 2 MG/ML
1 CONCENTRATE ORAL
Status: DISCONTINUED | OUTPATIENT
Start: 2023-01-01 | End: 2023-01-01 | Stop reason: HOSPADM

## 2023-01-01 RX ORDER — SCOLOPAMINE TRANSDERMAL SYSTEM 1 MG/1
1 PATCH, EXTENDED RELEASE TRANSDERMAL
Status: DISCONTINUED | OUTPATIENT
Start: 2023-01-01 | End: 2023-07-18 | Stop reason: HOSPADM

## 2023-01-01 RX ORDER — BISACODYL 5 MG/1
5 TABLET, DELAYED RELEASE ORAL DAILY PRN
Status: DISCONTINUED | OUTPATIENT
Start: 2023-01-01 | End: 2023-01-01

## 2023-01-01 RX ORDER — PANTOPRAZOLE SODIUM 40 MG/1
40 TABLET, DELAYED RELEASE ORAL
Status: DISCONTINUED | OUTPATIENT
Start: 2023-01-01 | End: 2023-01-01

## 2023-01-01 RX ORDER — MORPHINE SULFATE 4 MG/ML
4 INJECTION, SOLUTION INTRAMUSCULAR; INTRAVENOUS
Status: DISCONTINUED | OUTPATIENT
Start: 2023-01-01 | End: 2023-01-01 | Stop reason: HOSPADM

## 2023-01-01 RX ORDER — BISACODYL 10 MG
10 SUPPOSITORY, RECTAL RECTAL DAILY PRN
Status: DISCONTINUED | OUTPATIENT
Start: 2023-01-01 | End: 2023-01-01

## 2023-01-01 RX ORDER — LORAZEPAM 2 MG/ML
2 INJECTION INTRAMUSCULAR
Status: DISCONTINUED | OUTPATIENT
Start: 2023-01-01 | End: 2023-01-01 | Stop reason: HOSPADM

## 2023-01-01 RX ORDER — MORPHINE SULFATE 20 MG/ML
5 SOLUTION ORAL
Status: DISCONTINUED | OUTPATIENT
Start: 2023-01-01 | End: 2023-01-01

## 2023-01-01 RX ORDER — ACETAMINOPHEN 650 MG/1
650 SUPPOSITORY RECTAL EVERY 4 HOURS PRN
Status: DISCONTINUED | OUTPATIENT
Start: 2023-01-01 | End: 2023-01-01 | Stop reason: HOSPADM

## 2023-01-01 RX ORDER — GLYCOPYRROLATE 0.2 MG/ML
0.4 INJECTION INTRAMUSCULAR; INTRAVENOUS
Status: CANCELLED | OUTPATIENT
Start: 2023-01-01

## 2023-01-01 RX ORDER — LORAZEPAM 2 MG/ML
0.5 CONCENTRATE ORAL
Status: DISCONTINUED | OUTPATIENT
Start: 2023-01-01 | End: 2023-01-01 | Stop reason: HOSPADM

## 2023-01-01 RX ORDER — MORPHINE SULFATE 20 MG/ML
10 SOLUTION ORAL
Status: DISCONTINUED | OUTPATIENT
Start: 2023-01-01 | End: 2023-01-01 | Stop reason: HOSPADM

## 2023-01-01 RX ORDER — PROMETHAZINE HYDROCHLORIDE 25 MG/1
12.5 TABLET ORAL EVERY 4 HOURS PRN
Status: DISCONTINUED | OUTPATIENT
Start: 2023-01-01 | End: 2023-01-01 | Stop reason: HOSPADM

## 2023-01-01 RX ORDER — ONDANSETRON 2 MG/ML
4 INJECTION INTRAMUSCULAR; INTRAVENOUS ONCE
Status: COMPLETED | OUTPATIENT
Start: 2023-01-01 | End: 2023-01-01

## 2023-01-01 RX ORDER — HYDROCODONE BITARTRATE AND ACETAMINOPHEN 5; 325 MG/1; MG/1
1 TABLET ORAL EVERY 6 HOURS PRN
Status: DISCONTINUED | OUTPATIENT
Start: 2023-01-01 | End: 2023-01-01

## 2023-01-01 RX ORDER — METHOCARBAMOL 750 MG/1
750 TABLET, FILM COATED ORAL 4 TIMES DAILY
Status: DISCONTINUED | OUTPATIENT
Start: 2023-01-01 | End: 2023-01-01

## 2023-01-01 RX ORDER — SODIUM CHLORIDE 0.9 % (FLUSH) 0.9 %
10 SYRINGE (ML) INJECTION EVERY 12 HOURS SCHEDULED
Status: DISCONTINUED | OUTPATIENT
Start: 2023-01-01 | End: 2023-01-01

## 2023-01-01 RX ORDER — PROMETHAZINE HYDROCHLORIDE 25 MG/1
12.5 TABLET ORAL EVERY 4 HOURS PRN
Status: DISCONTINUED | OUTPATIENT
Start: 2023-01-01 | End: 2023-07-18 | Stop reason: HOSPADM

## 2023-01-01 RX ORDER — LORAZEPAM 2 MG/ML
0.5 INJECTION INTRAMUSCULAR
Status: DISCONTINUED | OUTPATIENT
Start: 2023-01-01 | End: 2023-07-18 | Stop reason: HOSPADM

## 2023-01-01 RX ORDER — PROMETHAZINE HYDROCHLORIDE 6.25 MG/5ML
12.5 SYRUP ORAL EVERY 4 HOURS PRN
Status: DISCONTINUED | OUTPATIENT
Start: 2023-01-01 | End: 2023-01-01 | Stop reason: HOSPADM

## 2023-01-01 RX ORDER — LORAZEPAM 2 MG/ML
0.5 CONCENTRATE ORAL
Status: CANCELLED | OUTPATIENT
Start: 2023-01-01 | End: 2023-07-26

## 2023-01-01 RX ORDER — ACETAMINOPHEN 650 MG/1
650 SUPPOSITORY RECTAL EVERY 4 HOURS PRN
Status: DISCONTINUED | OUTPATIENT
Start: 2023-01-01 | End: 2023-07-18 | Stop reason: HOSPADM

## 2023-01-01 RX ORDER — MORPHINE SULFATE 10 MG/ML
6 INJECTION INTRAMUSCULAR; INTRAVENOUS; SUBCUTANEOUS
Status: DISCONTINUED | OUTPATIENT
Start: 2023-01-01 | End: 2023-07-18 | Stop reason: HOSPADM

## 2023-01-01 RX ORDER — GLYCOPYRROLATE 0.2 MG/ML
0.2 INJECTION INTRAMUSCULAR; INTRAVENOUS
Status: DISCONTINUED | OUTPATIENT
Start: 2023-01-01 | End: 2023-01-01 | Stop reason: HOSPADM

## 2023-01-01 RX ORDER — HYDROMORPHONE HCL IN 0.9% NACL 10 MG/50ML
PATIENT CONTROLLED ANALGESIA SYRINGE INTRAVENOUS CONTINUOUS
Status: DISCONTINUED | OUTPATIENT
Start: 2023-01-01 | End: 2023-01-01

## 2023-01-01 RX ORDER — LORAZEPAM 2 MG/ML
2 CONCENTRATE ORAL
Status: DISCONTINUED | OUTPATIENT
Start: 2023-01-01 | End: 2023-01-01 | Stop reason: HOSPADM

## 2023-01-01 RX ORDER — LORAZEPAM 2 MG/ML
2 INJECTION INTRAMUSCULAR
Status: DISCONTINUED | OUTPATIENT
Start: 2023-01-01 | End: 2023-07-18 | Stop reason: HOSPADM

## 2023-01-01 RX ORDER — LORAZEPAM 2 MG/ML
1 CONCENTRATE ORAL
Status: CANCELLED | OUTPATIENT
Start: 2023-01-01 | End: 2023-07-26

## 2023-01-01 RX ORDER — MORPHINE SULFATE 4 MG/ML
4 INJECTION, SOLUTION INTRAMUSCULAR; INTRAVENOUS
Status: DISCONTINUED | OUTPATIENT
Start: 2023-01-01 | End: 2023-07-18 | Stop reason: HOSPADM

## 2023-01-01 RX ORDER — POLYETHYLENE GLYCOL 3350 17 G/17G
17 POWDER, FOR SOLUTION ORAL 2 TIMES DAILY
Status: DISCONTINUED | OUTPATIENT
Start: 2023-01-01 | End: 2023-01-01

## 2023-01-01 RX ORDER — TRAZODONE HYDROCHLORIDE 50 MG/1
50 TABLET ORAL NIGHTLY
Status: DISCONTINUED | OUTPATIENT
Start: 2023-01-01 | End: 2023-01-01

## 2023-01-01 RX ORDER — LORAZEPAM 2 MG/ML
0.5 INJECTION INTRAMUSCULAR ONCE
Status: COMPLETED | OUTPATIENT
Start: 2023-01-01 | End: 2023-01-01

## 2023-01-01 RX ORDER — OXYCODONE HYDROCHLORIDE AND ACETAMINOPHEN 5; 325 MG/1; MG/1
2 TABLET ORAL EVERY 4 HOURS PRN
Status: DISCONTINUED | OUTPATIENT
Start: 2023-01-01 | End: 2023-01-01

## 2023-01-01 RX ORDER — ACETAMINOPHEN 160 MG/5ML
650 SOLUTION ORAL EVERY 4 HOURS PRN
Status: DISCONTINUED | OUTPATIENT
Start: 2023-01-01 | End: 2023-01-01 | Stop reason: HOSPADM

## 2023-01-01 RX ORDER — HYDROMORPHONE HCL IN 0.9% NACL 30 MG/30ML
PATIENT CONTROLLED ANALGESIA SYRINGE INTRAVENOUS CONTINUOUS
Status: DISCONTINUED | OUTPATIENT
Start: 2023-01-01 | End: 2023-01-01

## 2023-01-01 RX ORDER — DEXAMETHASONE 4 MG/1
4 TABLET ORAL EVERY 6 HOURS SCHEDULED
Status: DISCONTINUED | OUTPATIENT
Start: 2023-01-01 | End: 2023-01-01

## 2023-01-01 RX ORDER — MORPHINE SULFATE 4 MG/ML
4 INJECTION, SOLUTION INTRAMUSCULAR; INTRAVENOUS
Status: CANCELLED | OUTPATIENT
Start: 2023-01-01 | End: 2023-07-26

## 2023-01-01 RX ORDER — SODIUM CHLORIDE 9 MG/ML
40 INJECTION, SOLUTION INTRAVENOUS AS NEEDED
Status: DISCONTINUED | OUTPATIENT
Start: 2023-01-01 | End: 2023-01-01

## 2023-01-01 RX ORDER — GLYCOPYRROLATE 0.2 MG/ML
0.2 INJECTION INTRAMUSCULAR; INTRAVENOUS
Status: CANCELLED | OUTPATIENT
Start: 2023-01-01

## 2023-01-01 RX ORDER — HYDROMORPHONE HCL IN 0.9% NACL 30 MG/30ML
PATIENT CONTROLLED ANALGESIA SYRINGE INTRAVENOUS CONTINUOUS
Status: DISPENSED | OUTPATIENT
Start: 2023-01-01 | End: 2023-01-01

## 2023-01-01 RX ORDER — HYDROMORPHONE HCL IN 0.9% NACL 50 MG/50ML
PATIENT CONTROLLED ANALGESIA SYRINGE INTRAVENOUS CONTINUOUS
Status: DISCONTINUED | OUTPATIENT
Start: 2023-01-01 | End: 2023-01-01

## 2023-01-01 RX ORDER — LORAZEPAM 2 MG/ML
0.5 INJECTION INTRAMUSCULAR
Status: DISCONTINUED | OUTPATIENT
Start: 2023-01-01 | End: 2023-01-01 | Stop reason: HOSPADM

## 2023-01-01 RX ORDER — FENTANYL 100 UG/H
1 PATCH TRANSDERMAL
Status: DISCONTINUED | OUTPATIENT
Start: 2023-01-01 | End: 2023-01-01

## 2023-01-01 RX ORDER — DIPHENOXYLATE HYDROCHLORIDE AND ATROPINE SULFATE 2.5; .025 MG/1; MG/1
1 TABLET ORAL
Status: DISCONTINUED | OUTPATIENT
Start: 2023-01-01 | End: 2023-07-18 | Stop reason: HOSPADM

## 2023-01-01 RX ORDER — ACETAMINOPHEN 650 MG/1
650 SUPPOSITORY RECTAL EVERY 4 HOURS PRN
Status: CANCELLED | OUTPATIENT
Start: 2023-01-01

## 2023-01-01 RX ORDER — METOPROLOL TARTRATE 50 MG/1
50 TABLET, FILM COATED ORAL EVERY 8 HOURS
Status: DISCONTINUED | OUTPATIENT
Start: 2023-01-01 | End: 2023-01-01

## 2023-01-01 RX ORDER — HYDROMORPHONE HYDROCHLORIDE 1 MG/ML
0.5 INJECTION, SOLUTION INTRAMUSCULAR; INTRAVENOUS; SUBCUTANEOUS
Status: DISCONTINUED | OUTPATIENT
Start: 2023-01-01 | End: 2023-01-01

## 2023-01-01 RX ORDER — NITROGLYCERIN 0.4 MG/1
0.4 TABLET SUBLINGUAL
Status: DISCONTINUED | OUTPATIENT
Start: 2023-01-01 | End: 2023-01-01

## 2023-01-01 RX ORDER — LIDOCAINE 50 MG/G
2 PATCH TOPICAL EVERY 24 HOURS
Status: DISCONTINUED | OUTPATIENT
Start: 2023-01-01 | End: 2023-01-01

## 2023-01-01 RX ORDER — LORAZEPAM 2 MG/ML
0.5 CONCENTRATE ORAL
Status: DISCONTINUED | OUTPATIENT
Start: 2023-01-01 | End: 2023-07-18 | Stop reason: HOSPADM

## 2023-01-01 RX ORDER — MESALAMINE 1.2 G/1
1.2 TABLET, DELAYED RELEASE ORAL
Status: DISCONTINUED | OUTPATIENT
Start: 2023-01-01 | End: 2023-01-01

## 2023-01-01 RX ORDER — ATROPINE SULFATE 10 MG/ML
2 SOLUTION/ DROPS OPHTHALMIC 2 TIMES DAILY PRN
Status: DISCONTINUED | OUTPATIENT
Start: 2023-01-01 | End: 2023-07-18 | Stop reason: HOSPADM

## 2023-01-01 RX ORDER — HYDROMORPHONE HYDROCHLORIDE 2 MG/ML
3 INJECTION, SOLUTION INTRAMUSCULAR; INTRAVENOUS; SUBCUTANEOUS EVERY 4 HOURS
Status: DISCONTINUED | OUTPATIENT
Start: 2023-01-01 | End: 2023-07-18 | Stop reason: HOSPADM

## 2023-01-01 RX ORDER — HYDROMORPHONE HYDROCHLORIDE 2 MG/ML
3 INJECTION, SOLUTION INTRAMUSCULAR; INTRAVENOUS; SUBCUTANEOUS
Status: DISCONTINUED | OUTPATIENT
Start: 2023-01-01 | End: 2023-07-18 | Stop reason: HOSPADM

## 2023-01-01 RX ORDER — ACETAMINOPHEN 325 MG/1
650 TABLET ORAL EVERY 4 HOURS PRN
Status: CANCELLED | OUTPATIENT
Start: 2023-01-01

## 2023-01-01 RX ORDER — SCOLOPAMINE TRANSDERMAL SYSTEM 1 MG/1
1 PATCH, EXTENDED RELEASE TRANSDERMAL
Status: DISCONTINUED | OUTPATIENT
Start: 2023-01-01 | End: 2023-01-01 | Stop reason: HOSPADM

## 2023-01-01 RX ORDER — PREGABALIN 25 MG/1
25 CAPSULE ORAL EVERY 12 HOURS SCHEDULED
Status: DISCONTINUED | OUTPATIENT
Start: 2023-01-01 | End: 2023-01-01

## 2023-01-01 RX ORDER — LORAZEPAM 2 MG/ML
1 CONCENTRATE ORAL
Status: DISCONTINUED | OUTPATIENT
Start: 2023-01-01 | End: 2023-07-18 | Stop reason: HOSPADM

## 2023-01-01 RX ORDER — LORAZEPAM 2 MG/ML
0.5 INJECTION INTRAMUSCULAR
Status: DISCONTINUED | OUTPATIENT
Start: 2023-01-01 | End: 2023-01-01

## 2023-01-01 RX ORDER — CALCIUM CARBONATE 500 MG/1
2 TABLET, CHEWABLE ORAL 2 TIMES DAILY PRN
Status: DISCONTINUED | OUTPATIENT
Start: 2023-01-01 | End: 2023-01-01

## 2023-01-01 RX ORDER — SODIUM BICARBONATE 650 MG/1
650 TABLET ORAL 3 TIMES DAILY
Status: DISCONTINUED | OUTPATIENT
Start: 2023-01-01 | End: 2023-01-01

## 2023-01-01 RX ORDER — CILOSTAZOL 100 MG/1
50 TABLET ORAL 2 TIMES DAILY
Status: DISCONTINUED | OUTPATIENT
Start: 2023-01-01 | End: 2023-01-01

## 2023-01-01 RX ORDER — ACETAMINOPHEN 325 MG/1
650 TABLET ORAL EVERY 4 HOURS PRN
Status: DISCONTINUED | OUTPATIENT
Start: 2023-01-01 | End: 2023-07-18 | Stop reason: HOSPADM

## 2023-01-01 RX ORDER — ACETAMINOPHEN 650 MG/1
650 SUPPOSITORY RECTAL EVERY 4 HOURS PRN
Status: DISCONTINUED | OUTPATIENT
Start: 2023-01-01 | End: 2023-01-01

## 2023-01-01 RX ORDER — HYDROMORPHONE HYDROCHLORIDE 2 MG/ML
3 INJECTION, SOLUTION INTRAMUSCULAR; INTRAVENOUS; SUBCUTANEOUS EVERY 4 HOURS
Status: CANCELLED | OUTPATIENT
Start: 2023-01-01 | End: 2023-07-24

## 2023-01-01 RX ORDER — MORPHINE SULFATE 20 MG/ML
10 SOLUTION ORAL
Status: CANCELLED | OUTPATIENT
Start: 2023-01-01 | End: 2023-07-26

## 2023-01-01 RX ORDER — PROMETHAZINE HYDROCHLORIDE 6.25 MG/5ML
12.5 SYRUP ORAL EVERY 4 HOURS PRN
Status: CANCELLED | OUTPATIENT
Start: 2023-01-01

## 2023-01-01 RX ORDER — LIDOCAINE HYDROCHLORIDE 10 MG/ML
20 INJECTION, SOLUTION INFILTRATION; PERINEURAL ONCE
Status: COMPLETED | OUTPATIENT
Start: 2023-01-01 | End: 2023-01-01

## 2023-01-01 RX ORDER — GLYCOPYRROLATE 0.2 MG/ML
0.2 INJECTION INTRAMUSCULAR; INTRAVENOUS
Status: DISCONTINUED | OUTPATIENT
Start: 2023-01-01 | End: 2023-07-18 | Stop reason: HOSPADM

## 2023-01-01 RX ORDER — METHOCARBAMOL 500 MG/1
1000 TABLET, FILM COATED ORAL EVERY 6 HOURS SCHEDULED
Status: DISCONTINUED | OUTPATIENT
Start: 2023-01-01 | End: 2023-01-01

## 2023-01-01 RX ORDER — ACETAMINOPHEN 160 MG/5ML
650 SOLUTION ORAL EVERY 4 HOURS PRN
Status: DISCONTINUED | OUTPATIENT
Start: 2023-01-01 | End: 2023-01-01

## 2023-01-01 RX ORDER — POLYETHYLENE GLYCOL 3350 17 G/17G
17 POWDER, FOR SOLUTION ORAL DAILY PRN
Status: DISCONTINUED | OUTPATIENT
Start: 2023-01-01 | End: 2023-01-01

## 2023-01-01 RX ORDER — METHOCARBAMOL 750 MG/1
750 TABLET, FILM COATED ORAL EVERY 6 HOURS SCHEDULED
Status: DISCONTINUED | OUTPATIENT
Start: 2023-01-01 | End: 2023-01-01

## 2023-01-01 RX ORDER — ONDANSETRON 4 MG/1
4 TABLET, FILM COATED ORAL EVERY 6 HOURS PRN
Status: DISCONTINUED | OUTPATIENT
Start: 2023-01-01 | End: 2023-01-01

## 2023-01-01 RX ORDER — MORPHINE SULFATE 20 MG/ML
20 SOLUTION ORAL
Status: DISCONTINUED | OUTPATIENT
Start: 2023-01-01 | End: 2023-01-01 | Stop reason: HOSPADM

## 2023-01-01 RX ORDER — SODIUM CHLORIDE 0.9 % (FLUSH) 0.9 %
10 SYRINGE (ML) INJECTION AS NEEDED
Status: DISCONTINUED | OUTPATIENT
Start: 2023-01-01 | End: 2023-01-01

## 2023-01-01 RX ORDER — ATROPINE SULFATE 10 MG/ML
2 SOLUTION/ DROPS OPHTHALMIC 2 TIMES DAILY PRN
Status: CANCELLED | OUTPATIENT
Start: 2023-01-01

## 2023-01-01 RX ORDER — HYDROMORPHONE HYDROCHLORIDE 1 MG/ML
0.5 INJECTION, SOLUTION INTRAMUSCULAR; INTRAVENOUS; SUBCUTANEOUS ONCE
Status: COMPLETED | OUTPATIENT
Start: 2023-01-01 | End: 2023-01-01

## 2023-01-01 RX ORDER — ACETAMINOPHEN 325 MG/1
650 TABLET ORAL EVERY 4 HOURS PRN
Status: DISCONTINUED | OUTPATIENT
Start: 2023-01-01 | End: 2023-01-01

## 2023-01-01 RX ORDER — ONDANSETRON 2 MG/ML
4 INJECTION INTRAMUSCULAR; INTRAVENOUS EVERY 6 HOURS PRN
Status: DISCONTINUED | OUTPATIENT
Start: 2023-01-01 | End: 2023-01-01

## 2023-01-01 RX ORDER — PROMETHAZINE HYDROCHLORIDE 12.5 MG/1
12.5 SUPPOSITORY RECTAL EVERY 4 HOURS PRN
Status: DISCONTINUED | OUTPATIENT
Start: 2023-01-01 | End: 2023-07-18 | Stop reason: HOSPADM

## 2023-01-01 RX ORDER — ACETAMINOPHEN 160 MG/5ML
650 SOLUTION ORAL EVERY 4 HOURS PRN
Status: DISCONTINUED | OUTPATIENT
Start: 2023-01-01 | End: 2023-07-18 | Stop reason: HOSPADM

## 2023-01-01 RX ORDER — HYDROMORPHONE HYDROCHLORIDE 2 MG/ML
1.5 INJECTION, SOLUTION INTRAMUSCULAR; INTRAVENOUS; SUBCUTANEOUS
Status: DISCONTINUED | OUTPATIENT
Start: 2023-01-01 | End: 2023-01-01

## 2023-01-01 RX ORDER — MORPHINE SULFATE 10 MG/ML
6 INJECTION INTRAMUSCULAR; INTRAVENOUS; SUBCUTANEOUS
Status: DISCONTINUED | OUTPATIENT
Start: 2023-01-01 | End: 2023-01-01 | Stop reason: HOSPADM

## 2023-01-01 RX ORDER — OXYCODONE HYDROCHLORIDE 5 MG/1
10 TABLET ORAL
Status: DISCONTINUED | OUTPATIENT
Start: 2023-01-01 | End: 2023-01-01

## 2023-01-01 RX ORDER — SODIUM CHLORIDE, SODIUM LACTATE, POTASSIUM CHLORIDE, CALCIUM CHLORIDE 600; 310; 30; 20 MG/100ML; MG/100ML; MG/100ML; MG/100ML
75 INJECTION, SOLUTION INTRAVENOUS CONTINUOUS
Status: DISCONTINUED | OUTPATIENT
Start: 2023-01-01 | End: 2023-01-01

## 2023-01-01 RX ORDER — HYDROMORPHONE HYDROCHLORIDE 2 MG/ML
3 INJECTION, SOLUTION INTRAMUSCULAR; INTRAVENOUS; SUBCUTANEOUS EVERY 4 HOURS
Status: DISCONTINUED | OUTPATIENT
Start: 2023-01-01 | End: 2023-01-01 | Stop reason: HOSPADM

## 2023-01-01 RX ORDER — OXYCODONE AND ACETAMINOPHEN 10; 325 MG/1; MG/1
1 TABLET ORAL EVERY 4 HOURS PRN
Status: DISCONTINUED | OUTPATIENT
Start: 2023-01-01 | End: 2023-01-01

## 2023-01-01 RX ORDER — LORAZEPAM 2 MG/ML
2 CONCENTRATE ORAL
Status: DISCONTINUED | OUTPATIENT
Start: 2023-01-01 | End: 2023-07-18 | Stop reason: HOSPADM

## 2023-01-01 RX ORDER — PROMETHAZINE HYDROCHLORIDE 12.5 MG/1
12.5 SUPPOSITORY RECTAL EVERY 4 HOURS PRN
Status: DISCONTINUED | OUTPATIENT
Start: 2023-01-01 | End: 2023-01-01 | Stop reason: HOSPADM

## 2023-01-01 RX ORDER — PROMETHAZINE HYDROCHLORIDE 6.25 MG/5ML
12.5 SYRUP ORAL EVERY 4 HOURS PRN
Status: DISCONTINUED | OUTPATIENT
Start: 2023-01-01 | End: 2023-07-18 | Stop reason: HOSPADM

## 2023-01-01 RX ORDER — ROSUVASTATIN CALCIUM 40 MG/1
40 TABLET, COATED ORAL DAILY
Status: DISCONTINUED | OUTPATIENT
Start: 2023-01-01 | End: 2023-01-01

## 2023-01-01 RX ORDER — PROMETHAZINE HYDROCHLORIDE 12.5 MG/1
12.5 SUPPOSITORY RECTAL EVERY 4 HOURS PRN
Status: CANCELLED | OUTPATIENT
Start: 2023-01-01

## 2023-01-01 RX ORDER — SODIUM CHLORIDE 9 MG/ML
125 INJECTION, SOLUTION INTRAVENOUS CONTINUOUS
Status: ACTIVE | OUTPATIENT
Start: 2023-01-01 | End: 2023-01-01

## 2023-01-01 RX ORDER — KETOROLAC TROMETHAMINE 15 MG/ML
15 INJECTION, SOLUTION INTRAMUSCULAR; INTRAVENOUS EVERY 6 HOURS PRN
Status: DISCONTINUED | OUTPATIENT
Start: 2023-01-01 | End: 2023-01-01

## 2023-01-01 RX ORDER — PROMETHAZINE HYDROCHLORIDE 25 MG/1
12.5 TABLET ORAL EVERY 4 HOURS PRN
Status: CANCELLED | OUTPATIENT
Start: 2023-01-01

## 2023-01-01 RX ORDER — MORPHINE SULFATE 20 MG/ML
20 SOLUTION ORAL
Status: CANCELLED | OUTPATIENT
Start: 2023-01-01 | End: 2023-07-26

## 2023-01-01 RX ADMIN — HYDROMORPHONE HYDROCHLORIDE 0.5 MG: 1 INJECTION, SOLUTION INTRAMUSCULAR; INTRAVENOUS; SUBCUTANEOUS at 16:09

## 2023-01-01 RX ADMIN — HYDROMORPHONE HYDROCHLORIDE: 10 INJECTION, SOLUTION INTRAMUSCULAR; INTRAVENOUS; SUBCUTANEOUS at 12:33

## 2023-01-01 RX ADMIN — POLYETHYLENE GLYCOL 3350 17 G: 17 POWDER, FOR SOLUTION ORAL at 00:55

## 2023-01-01 RX ADMIN — LORAZEPAM 0.5 MG: 0.5 TABLET ORAL at 21:18

## 2023-01-01 RX ADMIN — LORAZEPAM 2 MG: 2 INJECTION INTRAMUSCULAR; INTRAVENOUS at 02:39

## 2023-01-01 RX ADMIN — ROSUVASTATIN CALCIUM 40 MG: 40 TABLET, FILM COATED ORAL at 10:19

## 2023-01-01 RX ADMIN — SENNOSIDES AND DOCUSATE SODIUM 2 TABLET: 50; 8.6 TABLET ORAL at 10:18

## 2023-01-01 RX ADMIN — HYDROMORPHONE HYDROCHLORIDE 3 MG: 2 INJECTION, SOLUTION INTRAMUSCULAR; INTRAVENOUS; SUBCUTANEOUS at 16:59

## 2023-01-01 RX ADMIN — SENNOSIDES AND DOCUSATE SODIUM 2 TABLET: 50; 8.6 TABLET ORAL at 08:18

## 2023-01-01 RX ADMIN — HYDROMORPHONE HYDROCHLORIDE: 10 INJECTION, SOLUTION INTRAMUSCULAR; INTRAVENOUS; SUBCUTANEOUS at 11:48

## 2023-01-01 RX ADMIN — SODIUM CHLORIDE, POTASSIUM CHLORIDE, SODIUM LACTATE AND CALCIUM CHLORIDE 75 ML/HR: 600; 310; 30; 20 INJECTION, SOLUTION INTRAVENOUS at 20:54

## 2023-01-01 RX ADMIN — LORAZEPAM 0.5 MG: 2 INJECTION INTRAMUSCULAR; INTRAVENOUS at 13:26

## 2023-01-01 RX ADMIN — HYDROMORPHONE HYDROCHLORIDE 1.5 MG: 2 INJECTION, SOLUTION INTRAMUSCULAR; INTRAVENOUS; SUBCUTANEOUS at 03:43

## 2023-01-01 RX ADMIN — PANTOPRAZOLE SODIUM 40 MG: 40 TABLET, DELAYED RELEASE ORAL at 07:27

## 2023-01-01 RX ADMIN — PANCRELIPASE 12000 UNITS OF LIPASE: 60000; 12000; 38000 CAPSULE, DELAYED RELEASE PELLETS ORAL at 00:55

## 2023-01-01 RX ADMIN — LIDOCAINE 2 PATCH: 50 PATCH TOPICAL at 22:50

## 2023-01-01 RX ADMIN — PREGABALIN 25 MG: 25 CAPSULE ORAL at 10:03

## 2023-01-01 RX ADMIN — TRAZODONE HYDROCHLORIDE 50 MG: 50 TABLET ORAL at 21:23

## 2023-01-01 RX ADMIN — DEXAMETHASONE 4 MG: 4 TABLET ORAL at 18:51

## 2023-01-01 RX ADMIN — SODIUM BICARBONATE 650 MG: 650 TABLET ORAL at 22:50

## 2023-01-01 RX ADMIN — MESALAMINE 1.2 G: 1.2 TABLET, DELAYED RELEASE ORAL at 08:17

## 2023-01-01 RX ADMIN — Medication 10 ML: at 10:14

## 2023-01-01 RX ADMIN — KETOROLAC TROMETHAMINE 15 MG: 15 INJECTION, SOLUTION INTRAMUSCULAR; INTRAVENOUS at 11:28

## 2023-01-01 RX ADMIN — PANCRELIPASE 12000 UNITS OF LIPASE: 60000; 12000; 38000 CAPSULE, DELAYED RELEASE PELLETS ORAL at 17:38

## 2023-01-01 RX ADMIN — METHOCARBAMOL TABLETS 1000 MG: 750 TABLET, COATED ORAL at 13:12

## 2023-01-01 RX ADMIN — ROSUVASTATIN CALCIUM 40 MG: 40 TABLET, FILM COATED ORAL at 08:20

## 2023-01-01 RX ADMIN — PANCRELIPASE 12000 UNITS OF LIPASE: 60000; 12000; 38000 CAPSULE, DELAYED RELEASE PELLETS ORAL at 17:18

## 2023-01-01 RX ADMIN — SENNOSIDES AND DOCUSATE SODIUM 2 TABLET: 50; 8.6 TABLET ORAL at 09:46

## 2023-01-01 RX ADMIN — PANCRELIPASE 12000 UNITS OF LIPASE: 60000; 12000; 38000 CAPSULE, DELAYED RELEASE PELLETS ORAL at 11:43

## 2023-01-01 RX ADMIN — PANCRELIPASE 12000 UNITS OF LIPASE: 60000; 12000; 38000 CAPSULE, DELAYED RELEASE PELLETS ORAL at 18:25

## 2023-01-01 RX ADMIN — SODIUM BICARBONATE 650 MG: 650 TABLET ORAL at 08:30

## 2023-01-01 RX ADMIN — Medication 10 ML: at 08:23

## 2023-01-01 RX ADMIN — TRAZODONE HYDROCHLORIDE 50 MG: 50 TABLET ORAL at 20:42

## 2023-01-01 RX ADMIN — DEXAMETHASONE 4 MG: 4 TABLET ORAL at 06:14

## 2023-01-01 RX ADMIN — HYDROMORPHONE HYDROCHLORIDE 1 MG: 1 INJECTION, SOLUTION INTRAMUSCULAR; INTRAVENOUS; SUBCUTANEOUS at 08:20

## 2023-01-01 RX ADMIN — LORAZEPAM 1 MG: 2 INJECTION INTRAMUSCULAR; INTRAVENOUS at 02:13

## 2023-01-01 RX ADMIN — METHOCARBAMOL TABLETS 1000 MG: 750 TABLET, COATED ORAL at 18:41

## 2023-01-01 RX ADMIN — MESALAMINE 1.2 G: 1.2 TABLET, DELAYED RELEASE ORAL at 09:46

## 2023-01-01 RX ADMIN — HYDROMORPHONE HYDROCHLORIDE 1 MG: 1 INJECTION, SOLUTION INTRAMUSCULAR; INTRAVENOUS; SUBCUTANEOUS at 15:34

## 2023-01-01 RX ADMIN — SODIUM CHLORIDE, POTASSIUM CHLORIDE, SODIUM LACTATE AND CALCIUM CHLORIDE 75 ML/HR: 600; 310; 30; 20 INJECTION, SOLUTION INTRAVENOUS at 11:44

## 2023-01-01 RX ADMIN — METHOCARBAMOL TABLETS 1000 MG: 750 TABLET, COATED ORAL at 12:01

## 2023-01-01 RX ADMIN — SODIUM BICARBONATE 650 MG: 650 TABLET ORAL at 20:42

## 2023-01-01 RX ADMIN — METHOCARBAMOL TABLETS 1000 MG: 750 TABLET, COATED ORAL at 17:01

## 2023-01-01 RX ADMIN — MESALAMINE 1.2 G: 1.2 TABLET, DELAYED RELEASE ORAL at 08:18

## 2023-01-01 RX ADMIN — SENNOSIDES AND DOCUSATE SODIUM 2 TABLET: 50; 8.6 TABLET ORAL at 08:20

## 2023-01-01 RX ADMIN — LORAZEPAM 0.5 MG: 0.5 TABLET ORAL at 15:53

## 2023-01-01 RX ADMIN — HYDROMORPHONE HYDROCHLORIDE 1 MG: 1 INJECTION, SOLUTION INTRAMUSCULAR; INTRAVENOUS; SUBCUTANEOUS at 12:28

## 2023-01-01 RX ADMIN — HYDROMORPHONE HYDROCHLORIDE 1 MG: 1 INJECTION, SOLUTION INTRAMUSCULAR; INTRAVENOUS; SUBCUTANEOUS at 03:54

## 2023-01-01 RX ADMIN — ROSUVASTATIN CALCIUM 40 MG: 40 TABLET, FILM COATED ORAL at 08:19

## 2023-01-01 RX ADMIN — METOPROLOL TARTRATE 50 MG: 50 TABLET, FILM COATED ORAL at 18:06

## 2023-01-01 RX ADMIN — PANCRELIPASE 12000 UNITS OF LIPASE: 60000; 12000; 38000 CAPSULE, DELAYED RELEASE PELLETS ORAL at 13:12

## 2023-01-01 RX ADMIN — DEXAMETHASONE 4 MG: 4 TABLET ORAL at 18:25

## 2023-01-01 RX ADMIN — Medication 1 MG: at 21:42

## 2023-01-01 RX ADMIN — TRAZODONE HYDROCHLORIDE 50 MG: 50 TABLET ORAL at 00:54

## 2023-01-01 RX ADMIN — LORAZEPAM 0.5 MG: 2 INJECTION INTRAMUSCULAR; INTRAVENOUS at 10:05

## 2023-01-01 RX ADMIN — LIDOCAINE 2 PATCH: 50 PATCH TOPICAL at 20:17

## 2023-01-01 RX ADMIN — METHOCARBAMOL TABLETS 1000 MG: 750 TABLET, COATED ORAL at 23:45

## 2023-01-01 RX ADMIN — METHOCARBAMOL TABLETS 1000 MG: 750 TABLET, COATED ORAL at 11:28

## 2023-01-01 RX ADMIN — LORAZEPAM 0.5 MG: 2 INJECTION INTRAMUSCULAR; INTRAVENOUS at 15:34

## 2023-01-01 RX ADMIN — OXYCODONE HYDROCHLORIDE AND ACETAMINOPHEN 2 TABLET: 5; 325 TABLET ORAL at 18:26

## 2023-01-01 RX ADMIN — HYDROMORPHONE HYDROCHLORIDE: 10 INJECTION, SOLUTION INTRAMUSCULAR; INTRAVENOUS; SUBCUTANEOUS at 01:09

## 2023-01-01 RX ADMIN — HYDROMORPHONE HYDROCHLORIDE 1.5 MG: 2 INJECTION, SOLUTION INTRAMUSCULAR; INTRAVENOUS; SUBCUTANEOUS at 07:33

## 2023-01-01 RX ADMIN — METHOCARBAMOL TABLETS 1000 MG: 750 TABLET, COATED ORAL at 07:27

## 2023-01-01 RX ADMIN — DEXAMETHASONE 4 MG: 4 TABLET ORAL at 13:12

## 2023-01-01 RX ADMIN — PREGABALIN 50 MG: 50 CAPSULE ORAL at 09:40

## 2023-01-01 RX ADMIN — PANCRELIPASE 12000 UNITS OF LIPASE: 60000; 12000; 38000 CAPSULE, DELAYED RELEASE PELLETS ORAL at 08:20

## 2023-01-01 RX ADMIN — PANCRELIPASE 12000 UNITS OF LIPASE: 60000; 12000; 38000 CAPSULE, DELAYED RELEASE PELLETS ORAL at 08:18

## 2023-01-01 RX ADMIN — SODIUM CHLORIDE, POTASSIUM CHLORIDE, SODIUM LACTATE AND CALCIUM CHLORIDE 75 ML/HR: 600; 310; 30; 20 INJECTION, SOLUTION INTRAVENOUS at 03:39

## 2023-01-01 RX ADMIN — SODIUM BICARBONATE 650 MG: 650 TABLET ORAL at 21:17

## 2023-01-01 RX ADMIN — OXYCODONE HYDROCHLORIDE 10 MG: 5 TABLET ORAL at 09:31

## 2023-01-01 RX ADMIN — METOPROLOL TARTRATE 50 MG: 50 TABLET, FILM COATED ORAL at 00:00

## 2023-01-01 RX ADMIN — SENNOSIDES AND DOCUSATE SODIUM 2 TABLET: 50; 8.6 TABLET ORAL at 08:24

## 2023-01-01 RX ADMIN — SODIUM CHLORIDE, POTASSIUM CHLORIDE, SODIUM LACTATE AND CALCIUM CHLORIDE 75 ML/HR: 600; 310; 30; 20 INJECTION, SOLUTION INTRAVENOUS at 11:50

## 2023-01-01 RX ADMIN — ONDANSETRON 4 MG: 2 INJECTION INTRAMUSCULAR; INTRAVENOUS at 13:41

## 2023-01-01 RX ADMIN — HYDROMORPHONE HYDROCHLORIDE 1 MG: 1 INJECTION, SOLUTION INTRAMUSCULAR; INTRAVENOUS; SUBCUTANEOUS at 16:35

## 2023-01-01 RX ADMIN — PANCRELIPASE 12000 UNITS OF LIPASE: 60000; 12000; 38000 CAPSULE, DELAYED RELEASE PELLETS ORAL at 12:08

## 2023-01-01 RX ADMIN — HYDROMORPHONE HYDROCHLORIDE: 10 INJECTION, SOLUTION INTRAMUSCULAR; INTRAVENOUS; SUBCUTANEOUS at 10:12

## 2023-01-01 RX ADMIN — METOPROLOL TARTRATE 50 MG: 50 TABLET, FILM COATED ORAL at 10:04

## 2023-01-01 RX ADMIN — PANCRELIPASE 12000 UNITS OF LIPASE: 60000; 12000; 38000 CAPSULE, DELAYED RELEASE PELLETS ORAL at 08:30

## 2023-01-01 RX ADMIN — LORAZEPAM 1 MG: 2 INJECTION INTRAMUSCULAR; INTRAVENOUS at 21:01

## 2023-01-01 RX ADMIN — METHOCARBAMOL TABLETS 1000 MG: 750 TABLET, COATED ORAL at 10:04

## 2023-01-01 RX ADMIN — HYDROMORPHONE HYDROCHLORIDE 1 MG: 1 INJECTION, SOLUTION INTRAMUSCULAR; INTRAVENOUS; SUBCUTANEOUS at 21:01

## 2023-01-01 RX ADMIN — DEXAMETHASONE 4 MG: 4 TABLET ORAL at 08:23

## 2023-01-01 RX ADMIN — MESALAMINE 1.2 G: 1.2 TABLET, DELAYED RELEASE ORAL at 10:05

## 2023-01-01 RX ADMIN — PANCRELIPASE 12000 UNITS OF LIPASE: 60000; 12000; 38000 CAPSULE, DELAYED RELEASE PELLETS ORAL at 10:05

## 2023-01-01 RX ADMIN — LORAZEPAM 0.5 MG: 0.5 TABLET ORAL at 05:56

## 2023-01-01 RX ADMIN — HYDROMORPHONE HYDROCHLORIDE: 10 INJECTION, SOLUTION INTRAMUSCULAR; INTRAVENOUS; SUBCUTANEOUS at 10:30

## 2023-01-01 RX ADMIN — METHOCARBAMOL TABLETS 1000 MG: 750 TABLET, COATED ORAL at 11:42

## 2023-01-01 RX ADMIN — METOPROLOL TARTRATE 25 MG: 25 TABLET, FILM COATED ORAL at 20:26

## 2023-01-01 RX ADMIN — Medication 10 ML: at 20:15

## 2023-01-01 RX ADMIN — Medication 10 ML: at 22:51

## 2023-01-01 RX ADMIN — METHOCARBAMOL TABLETS 750 MG: 750 TABLET, COATED ORAL at 17:31

## 2023-01-01 RX ADMIN — METOPROLOL TARTRATE 50 MG: 50 TABLET, FILM COATED ORAL at 10:19

## 2023-01-01 RX ADMIN — Medication 10 ML: at 20:43

## 2023-01-01 RX ADMIN — METHOCARBAMOL TABLETS 750 MG: 750 TABLET, COATED ORAL at 06:24

## 2023-01-01 RX ADMIN — PANCRELIPASE 12000 UNITS OF LIPASE: 60000; 12000; 38000 CAPSULE, DELAYED RELEASE PELLETS ORAL at 18:06

## 2023-01-01 RX ADMIN — SODIUM BICARBONATE 650 MG: 650 TABLET ORAL at 10:03

## 2023-01-01 RX ADMIN — METOPROLOL TARTRATE 50 MG: 50 TABLET, FILM COATED ORAL at 23:45

## 2023-01-01 RX ADMIN — HYDROMORPHONE HYDROCHLORIDE 1 MG: 1 INJECTION, SOLUTION INTRAMUSCULAR; INTRAVENOUS; SUBCUTANEOUS at 12:03

## 2023-01-01 RX ADMIN — POLYETHYLENE GLYCOL 3350 17 G: 17 POWDER, FOR SOLUTION ORAL at 08:30

## 2023-01-01 RX ADMIN — LORAZEPAM 2 MG: 2 INJECTION INTRAMUSCULAR; INTRAVENOUS at 23:42

## 2023-01-01 RX ADMIN — HYDROMORPHONE HYDROCHLORIDE: 10 INJECTION, SOLUTION INTRAMUSCULAR; INTRAVENOUS; SUBCUTANEOUS at 13:12

## 2023-01-01 RX ADMIN — METOPROLOL TARTRATE 50 MG: 50 TABLET, FILM COATED ORAL at 08:55

## 2023-01-01 RX ADMIN — PANCRELIPASE 12000 UNITS OF LIPASE: 60000; 12000; 38000 CAPSULE, DELAYED RELEASE PELLETS ORAL at 12:39

## 2023-01-01 RX ADMIN — HYDROMORPHONE HYDROCHLORIDE 0.5 MG: 1 INJECTION, SOLUTION INTRAMUSCULAR; INTRAVENOUS; SUBCUTANEOUS at 02:20

## 2023-01-01 RX ADMIN — MORPHINE SULFATE 5 MG: 100 SOLUTION ORAL at 19:02

## 2023-01-01 RX ADMIN — METOPROLOL TARTRATE 50 MG: 50 TABLET, FILM COATED ORAL at 09:46

## 2023-01-01 RX ADMIN — SENNOSIDES AND DOCUSATE SODIUM 2 TABLET: 50; 8.6 TABLET ORAL at 20:27

## 2023-01-01 RX ADMIN — HYDROMORPHONE HYDROCHLORIDE 0.5 MG: 1 INJECTION, SOLUTION INTRAMUSCULAR; INTRAVENOUS; SUBCUTANEOUS at 07:34

## 2023-01-01 RX ADMIN — SENNOSIDES AND DOCUSATE SODIUM 2 TABLET: 50; 8.6 TABLET ORAL at 22:50

## 2023-01-01 RX ADMIN — HYDROMORPHONE HYDROCHLORIDE 1.5 MG: 2 INJECTION, SOLUTION INTRAMUSCULAR; INTRAVENOUS; SUBCUTANEOUS at 23:38

## 2023-01-01 RX ADMIN — METOPROLOL TARTRATE 50 MG: 50 TABLET, FILM COATED ORAL at 08:17

## 2023-01-01 RX ADMIN — HYDROMORPHONE HYDROCHLORIDE 3 MG: 2 INJECTION, SOLUTION INTRAMUSCULAR; INTRAVENOUS; SUBCUTANEOUS at 21:21

## 2023-01-01 RX ADMIN — METOPROLOL TARTRATE 50 MG: 50 TABLET, FILM COATED ORAL at 00:28

## 2023-01-01 RX ADMIN — LORAZEPAM 0.5 MG: 0.5 TABLET ORAL at 08:23

## 2023-01-01 RX ADMIN — LORAZEPAM 2 MG: 2 LIQUID ORAL at 00:53

## 2023-01-01 RX ADMIN — PREGABALIN 25 MG: 25 CAPSULE ORAL at 08:31

## 2023-01-01 RX ADMIN — LORAZEPAM 2 MG: 2 INJECTION INTRAMUSCULAR; INTRAVENOUS at 09:43

## 2023-01-01 RX ADMIN — HYDROMORPHONE HYDROCHLORIDE 1 MG: 1 INJECTION, SOLUTION INTRAMUSCULAR; INTRAVENOUS; SUBCUTANEOUS at 08:21

## 2023-01-01 RX ADMIN — HYDROMORPHONE HYDROCHLORIDE 1 MG: 1 INJECTION, SOLUTION INTRAMUSCULAR; INTRAVENOUS; SUBCUTANEOUS at 10:44

## 2023-01-01 RX ADMIN — PREGABALIN 25 MG: 25 CAPSULE ORAL at 00:55

## 2023-01-01 RX ADMIN — Medication 10 ML: at 09:46

## 2023-01-01 RX ADMIN — PANTOPRAZOLE SODIUM 40 MG: 40 TABLET, DELAYED RELEASE ORAL at 06:48

## 2023-01-01 RX ADMIN — HYDROMORPHONE HYDROCHLORIDE 0.5 MG: 1 INJECTION, SOLUTION INTRAMUSCULAR; INTRAVENOUS; SUBCUTANEOUS at 13:26

## 2023-01-01 RX ADMIN — LIDOCAINE 2 PATCH: 50 PATCH TOPICAL at 20:43

## 2023-01-01 RX ADMIN — LORAZEPAM 1 MG: 2 INJECTION INTRAMUSCULAR; INTRAVENOUS at 09:05

## 2023-01-01 RX ADMIN — GLYCOPYRROLATE 0.4 MG: 0.2 INJECTION INTRAMUSCULAR; INTRAVENOUS at 21:20

## 2023-01-01 RX ADMIN — SENNOSIDES AND DOCUSATE SODIUM 2 TABLET: 50; 8.6 TABLET ORAL at 21:17

## 2023-01-01 RX ADMIN — METOPROLOL TARTRATE 50 MG: 50 TABLET, FILM COATED ORAL at 08:20

## 2023-01-01 RX ADMIN — ONDANSETRON 4 MG: 2 INJECTION INTRAMUSCULAR; INTRAVENOUS at 12:02

## 2023-01-01 RX ADMIN — MESALAMINE 1.2 G: 1.2 TABLET, DELAYED RELEASE ORAL at 08:31

## 2023-01-01 RX ADMIN — LORAZEPAM 2 MG: 2 INJECTION INTRAMUSCULAR; INTRAVENOUS at 12:28

## 2023-01-01 RX ADMIN — PREGABALIN 25 MG: 25 CAPSULE ORAL at 10:18

## 2023-01-01 RX ADMIN — LIDOCAINE HYDROCHLORIDE 20 ML: 10 INJECTION, SOLUTION INFILTRATION; PERINEURAL at 14:03

## 2023-01-01 RX ADMIN — KETOROLAC TROMETHAMINE 15 MG: 15 INJECTION, SOLUTION INTRAMUSCULAR; INTRAVENOUS at 21:43

## 2023-01-01 RX ADMIN — SODIUM BICARBONATE 650 MG: 650 TABLET ORAL at 17:38

## 2023-01-01 RX ADMIN — SODIUM BICARBONATE 650 MG: 650 TABLET ORAL at 10:25

## 2023-01-01 RX ADMIN — METHOCARBAMOL TABLETS 750 MG: 750 TABLET, COATED ORAL at 23:56

## 2023-01-01 RX ADMIN — DEXAMETHASONE 4 MG: 4 TABLET ORAL at 00:54

## 2023-01-01 RX ADMIN — PANCRELIPASE 12000 UNITS OF LIPASE: 60000; 12000; 38000 CAPSULE, DELAYED RELEASE PELLETS ORAL at 17:01

## 2023-01-01 RX ADMIN — SODIUM BICARBONATE 650 MG: 650 TABLET ORAL at 17:45

## 2023-01-01 RX ADMIN — Medication 10 ML: at 08:26

## 2023-01-01 RX ADMIN — LORAZEPAM 2 MG: 2 INJECTION INTRAMUSCULAR; INTRAVENOUS at 21:21

## 2023-01-01 RX ADMIN — PANCRELIPASE 12000 UNITS OF LIPASE: 60000; 12000; 38000 CAPSULE, DELAYED RELEASE PELLETS ORAL at 17:42

## 2023-01-01 RX ADMIN — PREGABALIN 50 MG: 50 CAPSULE ORAL at 23:45

## 2023-01-01 RX ADMIN — Medication 1 MG: at 20:53

## 2023-01-01 RX ADMIN — HYDROMORPHONE HYDROCHLORIDE 1 MG: 1 INJECTION, SOLUTION INTRAMUSCULAR; INTRAVENOUS; SUBCUTANEOUS at 13:03

## 2023-01-01 RX ADMIN — HYDROMORPHONE HYDROCHLORIDE 0.5 MG: 1 INJECTION, SOLUTION INTRAMUSCULAR; INTRAVENOUS; SUBCUTANEOUS at 10:05

## 2023-01-01 RX ADMIN — OXYCODONE HYDROCHLORIDE AND ACETAMINOPHEN 1 TABLET: 10; 325 TABLET ORAL at 22:26

## 2023-01-01 RX ADMIN — METOPROLOL TARTRATE 50 MG: 50 TABLET, FILM COATED ORAL at 16:00

## 2023-01-01 RX ADMIN — PREGABALIN 50 MG: 50 CAPSULE ORAL at 22:58

## 2023-01-01 RX ADMIN — HYDROMORPHONE HYDROCHLORIDE 1 MG: 1 INJECTION, SOLUTION INTRAMUSCULAR; INTRAVENOUS; SUBCUTANEOUS at 09:43

## 2023-01-01 RX ADMIN — LORAZEPAM 0.5 MG: 2 INJECTION INTRAMUSCULAR; INTRAVENOUS at 05:19

## 2023-01-01 RX ADMIN — METHOCARBAMOL TABLETS 1000 MG: 750 TABLET, COATED ORAL at 06:24

## 2023-01-01 RX ADMIN — METOPROLOL TARTRATE 50 MG: 50 TABLET, FILM COATED ORAL at 09:30

## 2023-01-01 RX ADMIN — DEXAMETHASONE 4 MG: 4 TABLET ORAL at 06:24

## 2023-01-01 RX ADMIN — METHOCARBAMOL TABLETS 1000 MG: 750 TABLET, COATED ORAL at 05:56

## 2023-01-01 RX ADMIN — OXYCODONE HYDROCHLORIDE AND ACETAMINOPHEN 1 TABLET: 10; 325 TABLET ORAL at 14:48

## 2023-01-01 RX ADMIN — METHOCARBAMOL TABLETS 1000 MG: 750 TABLET, COATED ORAL at 12:39

## 2023-01-01 RX ADMIN — HYDROMORPHONE HYDROCHLORIDE 1.5 MG: 2 INJECTION, SOLUTION INTRAMUSCULAR; INTRAVENOUS; SUBCUTANEOUS at 02:39

## 2023-01-01 RX ADMIN — PREGABALIN 25 MG: 25 CAPSULE ORAL at 22:50

## 2023-01-01 RX ADMIN — SODIUM CHLORIDE, POTASSIUM CHLORIDE, SODIUM LACTATE AND CALCIUM CHLORIDE 75 ML/HR: 600; 310; 30; 20 INJECTION, SOLUTION INTRAVENOUS at 15:00

## 2023-01-01 RX ADMIN — OXYCODONE HYDROCHLORIDE 10 MG: 5 TABLET ORAL at 22:08

## 2023-01-01 RX ADMIN — HYDROMORPHONE HYDROCHLORIDE 1 MG: 1 INJECTION, SOLUTION INTRAMUSCULAR; INTRAVENOUS; SUBCUTANEOUS at 20:44

## 2023-01-01 RX ADMIN — SODIUM CHLORIDE 125 ML/HR: 9 INJECTION, SOLUTION INTRAVENOUS at 18:26

## 2023-01-01 RX ADMIN — METOPROLOL TARTRATE 50 MG: 50 TABLET, FILM COATED ORAL at 17:42

## 2023-01-01 RX ADMIN — POLYETHYLENE GLYCOL 3350 17 G: 17 POWDER, FOR SOLUTION ORAL at 21:42

## 2023-01-01 RX ADMIN — HYDROMORPHONE HYDROCHLORIDE 1 MG: 1 INJECTION, SOLUTION INTRAMUSCULAR; INTRAVENOUS; SUBCUTANEOUS at 05:17

## 2023-01-01 RX ADMIN — TRAZODONE HYDROCHLORIDE 50 MG: 50 TABLET ORAL at 21:27

## 2023-01-01 RX ADMIN — KETOROLAC TROMETHAMINE 15 MG: 15 INJECTION, SOLUTION INTRAMUSCULAR; INTRAVENOUS at 05:26

## 2023-01-01 RX ADMIN — LORAZEPAM 0.5 MG: 2 INJECTION INTRAMUSCULAR; INTRAVENOUS at 19:05

## 2023-01-01 RX ADMIN — LORAZEPAM 0.5 MG: 2 INJECTION INTRAMUSCULAR; INTRAVENOUS at 19:58

## 2023-01-01 RX ADMIN — OXYCODONE HYDROCHLORIDE 10 MG: 5 TABLET ORAL at 06:24

## 2023-01-01 RX ADMIN — METOPROLOL TARTRATE 50 MG: 50 TABLET, FILM COATED ORAL at 10:02

## 2023-01-01 RX ADMIN — PANCRELIPASE 12000 UNITS OF LIPASE: 60000; 12000; 38000 CAPSULE, DELAYED RELEASE PELLETS ORAL at 08:23

## 2023-01-01 RX ADMIN — PANCRELIPASE 12000 UNITS OF LIPASE: 60000; 12000; 38000 CAPSULE, DELAYED RELEASE PELLETS ORAL at 12:01

## 2023-01-01 RX ADMIN — SODIUM CHLORIDE 500 ML: 9 INJECTION, SOLUTION INTRAVENOUS at 04:28

## 2023-01-01 RX ADMIN — METOPROLOL TARTRATE 50 MG: 50 TABLET, FILM COATED ORAL at 15:53

## 2023-01-01 RX ADMIN — METHOCARBAMOL TABLETS 1000 MG: 750 TABLET, COATED ORAL at 06:14

## 2023-01-01 RX ADMIN — METHOCARBAMOL TABLETS 1000 MG: 750 TABLET, COATED ORAL at 00:03

## 2023-01-01 RX ADMIN — SODIUM BICARBONATE 650 MG: 650 TABLET ORAL at 12:16

## 2023-01-01 RX ADMIN — METHOCARBAMOL TABLETS 750 MG: 750 TABLET, COATED ORAL at 00:16

## 2023-01-01 RX ADMIN — SENNOSIDES AND DOCUSATE SODIUM 2 TABLET: 50; 8.6 TABLET ORAL at 00:55

## 2023-01-01 RX ADMIN — SENNOSIDES AND DOCUSATE SODIUM 2 TABLET: 50; 8.6 TABLET ORAL at 20:11

## 2023-01-01 RX ADMIN — METHOCARBAMOL TABLETS 1000 MG: 750 TABLET, COATED ORAL at 18:06

## 2023-01-01 RX ADMIN — METOPROLOL TARTRATE 50 MG: 50 TABLET, FILM COATED ORAL at 08:23

## 2023-01-01 RX ADMIN — SODIUM CHLORIDE, POTASSIUM CHLORIDE, SODIUM LACTATE AND CALCIUM CHLORIDE 75 ML/HR: 600; 310; 30; 20 INJECTION, SOLUTION INTRAVENOUS at 01:38

## 2023-01-01 RX ADMIN — DEXAMETHASONE 4 MG: 4 TABLET ORAL at 07:27

## 2023-01-01 RX ADMIN — PREGABALIN 25 MG: 25 CAPSULE ORAL at 20:42

## 2023-01-01 RX ADMIN — Medication 10 ML: at 22:20

## 2023-01-01 RX ADMIN — HYDROMORPHONE HYDROCHLORIDE 1 MG: 1 INJECTION, SOLUTION INTRAMUSCULAR; INTRAVENOUS; SUBCUTANEOUS at 14:35

## 2023-01-01 RX ADMIN — PANCRELIPASE 12000 UNITS OF LIPASE: 60000; 12000; 38000 CAPSULE, DELAYED RELEASE PELLETS ORAL at 10:03

## 2023-01-01 RX ADMIN — METHOCARBAMOL TABLETS 750 MG: 750 TABLET, COATED ORAL at 15:58

## 2023-01-01 RX ADMIN — Medication 10 ML: at 20:28

## 2023-01-01 RX ADMIN — TRAZODONE HYDROCHLORIDE 50 MG: 50 TABLET ORAL at 22:50

## 2023-01-01 RX ADMIN — POLYETHYLENE GLYCOL 3350 17 G: 17 POWDER, FOR SOLUTION ORAL at 09:46

## 2023-01-01 RX ADMIN — LIDOCAINE 2 PATCH: 50 PATCH TOPICAL at 21:17

## 2023-01-01 RX ADMIN — HYDROMORPHONE HYDROCHLORIDE 1 MG: 1 INJECTION, SOLUTION INTRAMUSCULAR; INTRAVENOUS; SUBCUTANEOUS at 01:35

## 2023-01-01 RX ADMIN — GLYCOPYRROLATE 0.4 MG: 0.2 INJECTION INTRAMUSCULAR; INTRAVENOUS at 12:28

## 2023-01-01 RX ADMIN — FENTANYL 1 PATCH: 100 PATCH TRANSDERMAL at 12:28

## 2023-01-01 RX ADMIN — PANCRELIPASE 12000 UNITS OF LIPASE: 60000; 12000; 38000 CAPSULE, DELAYED RELEASE PELLETS ORAL at 09:46

## 2023-01-01 RX ADMIN — HYDROMORPHONE HYDROCHLORIDE 1 MG: 1 INJECTION, SOLUTION INTRAMUSCULAR; INTRAVENOUS; SUBCUTANEOUS at 05:40

## 2023-01-01 RX ADMIN — SENNOSIDES AND DOCUSATE SODIUM 2 TABLET: 50; 8.6 TABLET ORAL at 10:03

## 2023-01-01 RX ADMIN — Medication 1 MG: at 22:26

## 2023-01-01 RX ADMIN — TRAZODONE HYDROCHLORIDE 50 MG: 50 TABLET ORAL at 23:45

## 2023-01-01 RX ADMIN — PANCRELIPASE 12000 UNITS OF LIPASE: 60000; 12000; 38000 CAPSULE, DELAYED RELEASE PELLETS ORAL at 12:16

## 2023-01-01 RX ADMIN — GLYCOPYRROLATE 0.4 MG: 0.2 INJECTION INTRAMUSCULAR; INTRAVENOUS at 16:59

## 2023-01-01 RX ADMIN — DEXAMETHASONE 4 MG: 4 TABLET ORAL at 10:05

## 2023-01-01 RX ADMIN — MESALAMINE 1.2 G: 1.2 TABLET, DELAYED RELEASE ORAL at 08:23

## 2023-01-01 RX ADMIN — DEXAMETHASONE 4 MG: 4 TABLET ORAL at 00:44

## 2023-01-01 RX ADMIN — METHOCARBAMOL TABLETS 1000 MG: 750 TABLET, COATED ORAL at 06:48

## 2023-01-01 RX ADMIN — PREGABALIN 50 MG: 50 CAPSULE ORAL at 21:27

## 2023-01-01 RX ADMIN — POLYETHYLENE GLYCOL 3350 17 G: 17 POWDER, FOR SOLUTION ORAL at 20:42

## 2023-01-01 RX ADMIN — Medication 10 ML: at 09:10

## 2023-01-01 RX ADMIN — GADOBENATE DIMEGLUMINE 13 ML: 529 INJECTION, SOLUTION INTRAVENOUS at 15:30

## 2023-01-01 RX ADMIN — Medication 10 ML: at 08:21

## 2023-01-01 RX ADMIN — HYDROMORPHONE HYDROCHLORIDE 0.5 MG: 1 INJECTION, SOLUTION INTRAMUSCULAR; INTRAVENOUS; SUBCUTANEOUS at 05:19

## 2023-01-01 RX ADMIN — DEXAMETHASONE 4 MG: 4 TABLET ORAL at 22:58

## 2023-01-01 RX ADMIN — METHOCARBAMOL TABLETS 1000 MG: 750 TABLET, COATED ORAL at 00:44

## 2023-01-01 RX ADMIN — METHOCARBAMOL TABLETS 1000 MG: 750 TABLET, COATED ORAL at 17:17

## 2023-01-01 RX ADMIN — LORAZEPAM 2 MG: 2 INJECTION INTRAMUSCULAR; INTRAVENOUS at 03:43

## 2023-01-01 RX ADMIN — SENNOSIDES AND DOCUSATE SODIUM 2 TABLET: 50; 8.6 TABLET ORAL at 08:31

## 2023-01-01 RX ADMIN — TRAZODONE HYDROCHLORIDE 50 MG: 50 TABLET ORAL at 21:42

## 2023-01-01 RX ADMIN — SODIUM BICARBONATE 650 MG: 650 TABLET ORAL at 22:08

## 2023-01-01 RX ADMIN — HYDROMORPHONE HYDROCHLORIDE 1.5 MG: 2 INJECTION, SOLUTION INTRAMUSCULAR; INTRAVENOUS; SUBCUTANEOUS at 05:56

## 2023-01-01 RX ADMIN — METHOCARBAMOL TABLETS 750 MG: 750 TABLET, COATED ORAL at 12:16

## 2023-01-01 RX ADMIN — METOPROLOL TARTRATE 50 MG: 50 TABLET, FILM COATED ORAL at 09:29

## 2023-01-01 RX ADMIN — HYDROMORPHONE HYDROCHLORIDE 1.5 MG: 2 INJECTION, SOLUTION INTRAMUSCULAR; INTRAVENOUS; SUBCUTANEOUS at 01:32

## 2023-01-01 RX ADMIN — OXYCODONE HYDROCHLORIDE AND ACETAMINOPHEN 1 TABLET: 10; 325 TABLET ORAL at 21:24

## 2023-01-01 RX ADMIN — LORAZEPAM 0.5 MG: 2 INJECTION INTRAMUSCULAR; INTRAVENOUS at 22:58

## 2023-01-01 RX ADMIN — METOPROLOL TARTRATE 50 MG: 50 TABLET, FILM COATED ORAL at 16:58

## 2023-01-01 RX ADMIN — METHOCARBAMOL TABLETS 1000 MG: 750 TABLET, COATED ORAL at 18:25

## 2023-01-01 RX ADMIN — PANCRELIPASE 12000 UNITS OF LIPASE: 60000; 12000; 38000 CAPSULE, DELAYED RELEASE PELLETS ORAL at 17:31

## 2023-01-01 RX ADMIN — HYDROMORPHONE HYDROCHLORIDE 1 MG: 1 INJECTION, SOLUTION INTRAMUSCULAR; INTRAVENOUS; SUBCUTANEOUS at 06:24

## 2023-01-01 RX ADMIN — METOPROLOL TARTRATE 50 MG: 50 TABLET, FILM COATED ORAL at 17:18

## 2023-01-01 RX ADMIN — IOPAMIDOL 85 ML: 612 INJECTION, SOLUTION INTRAVENOUS at 09:24

## 2023-01-01 RX ADMIN — LORAZEPAM 0.5 MG: 2 INJECTION INTRAMUSCULAR; INTRAVENOUS at 01:04

## 2023-01-01 RX ADMIN — HYDROMORPHONE HYDROCHLORIDE 0.5 MG: 1 INJECTION, SOLUTION INTRAMUSCULAR; INTRAVENOUS; SUBCUTANEOUS at 22:58

## 2023-01-01 RX ADMIN — METOPROLOL TARTRATE 50 MG: 50 TABLET, FILM COATED ORAL at 00:45

## 2023-01-01 RX ADMIN — DEXAMETHASONE 4 MG: 4 TABLET ORAL at 17:42

## 2023-01-01 RX ADMIN — METHOCARBAMOL 750 MG: 750 TABLET ORAL at 13:12

## 2023-01-01 RX ADMIN — HYDROMORPHONE HYDROCHLORIDE 0.5 MG: 1 INJECTION, SOLUTION INTRAMUSCULAR; INTRAVENOUS; SUBCUTANEOUS at 00:15

## 2023-01-01 RX ADMIN — HYDROMORPHONE HYDROCHLORIDE 1 MG: 1 INJECTION, SOLUTION INTRAMUSCULAR; INTRAVENOUS; SUBCUTANEOUS at 20:11

## 2023-01-01 RX ADMIN — PANTOPRAZOLE SODIUM 40 MG: 40 TABLET, DELAYED RELEASE ORAL at 06:24

## 2023-01-01 RX ADMIN — HYDROMORPHONE HYDROCHLORIDE 1 MG: 1 INJECTION, SOLUTION INTRAMUSCULAR; INTRAVENOUS; SUBCUTANEOUS at 19:05

## 2023-01-01 RX ADMIN — SENNOSIDES AND DOCUSATE SODIUM 2 TABLET: 50; 8.6 TABLET ORAL at 23:45

## 2023-01-01 RX ADMIN — DEXAMETHASONE 4 MG: 4 TABLET ORAL at 17:01

## 2023-01-01 RX ADMIN — HYDROMORPHONE HYDROCHLORIDE: 10 INJECTION, SOLUTION INTRAMUSCULAR; INTRAVENOUS; SUBCUTANEOUS at 00:51

## 2023-01-01 RX ADMIN — LORAZEPAM 2 MG: 2 INJECTION INTRAMUSCULAR; INTRAVENOUS at 16:59

## 2023-01-01 RX ADMIN — PANTOPRAZOLE SODIUM 40 MG: 40 TABLET, DELAYED RELEASE ORAL at 05:40

## 2023-01-01 RX ADMIN — DEXAMETHASONE 4 MG: 4 TABLET ORAL at 12:39

## 2023-01-01 RX ADMIN — METHOCARBAMOL TABLETS 750 MG: 750 TABLET, COATED ORAL at 05:16

## 2023-01-01 RX ADMIN — HYDROMORPHONE HYDROCHLORIDE 1 MG: 1 INJECTION, SOLUTION INTRAMUSCULAR; INTRAVENOUS; SUBCUTANEOUS at 15:53

## 2023-01-01 RX ADMIN — LORAZEPAM 2 MG: 2 INJECTION INTRAMUSCULAR; INTRAVENOUS at 05:56

## 2023-01-01 RX ADMIN — SODIUM CHLORIDE, POTASSIUM CHLORIDE, SODIUM LACTATE AND CALCIUM CHLORIDE 500 ML: 600; 310; 30; 20 INJECTION, SOLUTION INTRAVENOUS at 11:43

## 2023-01-01 RX ADMIN — LORAZEPAM 0.5 MG: 0.5 TABLET ORAL at 20:53

## 2023-01-01 RX ADMIN — DEXAMETHASONE 4 MG: 4 TABLET ORAL at 11:28

## 2023-01-01 RX ADMIN — POLYETHYLENE GLYCOL 3350 17 G: 17 POWDER, FOR SOLUTION ORAL at 08:24

## 2023-01-01 RX ADMIN — METHOCARBAMOL TABLETS 1000 MG: 750 TABLET, COATED ORAL at 23:23

## 2023-01-01 RX ADMIN — HYDROMORPHONE HYDROCHLORIDE 1 MG: 1 INJECTION, SOLUTION INTRAMUSCULAR; INTRAVENOUS; SUBCUTANEOUS at 01:57

## 2023-01-01 RX ADMIN — METHOCARBAMOL TABLETS 1000 MG: 750 TABLET, COATED ORAL at 12:08

## 2023-01-01 RX ADMIN — PREGABALIN 50 MG: 50 CAPSULE ORAL at 21:42

## 2023-01-01 RX ADMIN — HYDROMORPHONE HYDROCHLORIDE: 10 INJECTION, SOLUTION INTRAMUSCULAR; INTRAVENOUS; SUBCUTANEOUS at 10:21

## 2023-01-01 RX ADMIN — SODIUM CHLORIDE 125 ML/HR: 9 INJECTION, SOLUTION INTRAVENOUS at 02:17

## 2023-01-01 RX ADMIN — HYDROMORPHONE HYDROCHLORIDE 0.5 MG: 1 INJECTION, SOLUTION INTRAMUSCULAR; INTRAVENOUS; SUBCUTANEOUS at 01:05

## 2023-01-01 RX ADMIN — PREGABALIN 50 MG: 50 CAPSULE ORAL at 09:29

## 2023-01-01 RX ADMIN — PANTOPRAZOLE SODIUM 40 MG: 40 TABLET, DELAYED RELEASE ORAL at 05:26

## 2023-01-01 RX ADMIN — MESALAMINE 1.2 G: 1.2 TABLET, DELAYED RELEASE ORAL at 08:21

## 2023-01-01 RX ADMIN — SODIUM BICARBONATE 650 MG: 650 TABLET ORAL at 15:54

## 2023-01-01 RX ADMIN — FENTANYL 1 PATCH: 100 PATCH TRANSDERMAL at 17:01

## 2023-01-01 RX ADMIN — PREGABALIN 50 MG: 50 CAPSULE ORAL at 08:17

## 2023-01-01 RX ADMIN — PANCRELIPASE 12000 UNITS OF LIPASE: 60000; 12000; 38000 CAPSULE, DELAYED RELEASE PELLETS ORAL at 10:26

## 2023-01-01 RX ADMIN — HYDROMORPHONE HYDROCHLORIDE 1 MG: 1 INJECTION, SOLUTION INTRAMUSCULAR; INTRAVENOUS; SUBCUTANEOUS at 09:06

## 2023-01-01 RX ADMIN — PANTOPRAZOLE SODIUM 40 MG: 40 TABLET, DELAYED RELEASE ORAL at 05:17

## 2023-01-01 RX ADMIN — SENNOSIDES AND DOCUSATE SODIUM 2 TABLET: 50; 8.6 TABLET ORAL at 20:42

## 2023-01-01 RX ADMIN — DEXAMETHASONE 4 MG: 4 TABLET ORAL at 12:01

## 2023-01-01 RX ADMIN — METOPROLOL TARTRATE 50 MG: 50 TABLET, FILM COATED ORAL at 00:03

## 2023-01-01 RX ADMIN — PANCRELIPASE 12000 UNITS OF LIPASE: 60000; 12000; 38000 CAPSULE, DELAYED RELEASE PELLETS ORAL at 08:17

## 2023-01-01 RX ADMIN — DEXAMETHASONE 4 MG: 4 TABLET ORAL at 23:45

## 2023-01-01 RX ADMIN — SODIUM BICARBONATE 650 MG: 650 TABLET ORAL at 08:20

## 2023-01-01 RX ADMIN — MORPHINE SULFATE 15 MG: 15 TABLET ORAL at 18:21

## 2023-01-01 RX ADMIN — PREGABALIN 50 MG: 50 CAPSULE ORAL at 10:05

## 2023-01-01 RX ADMIN — METOPROLOL TARTRATE 5 MG: 1 INJECTION, SOLUTION INTRAVENOUS at 14:33

## 2023-01-01 RX ADMIN — HYDROMORPHONE HYDROCHLORIDE: 10 INJECTION, SOLUTION INTRAMUSCULAR; INTRAVENOUS; SUBCUTANEOUS at 17:34

## 2023-01-01 RX ADMIN — LORAZEPAM 2 MG: 2 INJECTION INTRAMUSCULAR; INTRAVENOUS at 07:34

## 2023-01-01 RX ADMIN — SODIUM BICARBONATE 650 MG: 650 TABLET ORAL at 17:18

## 2023-01-01 RX ADMIN — Medication 10 ML: at 21:18

## 2023-01-01 RX ADMIN — PANTOPRAZOLE SODIUM 40 MG: 40 TABLET, DELAYED RELEASE ORAL at 10:05

## 2023-01-01 RX ADMIN — METHOCARBAMOL TABLETS 1000 MG: 750 TABLET, COATED ORAL at 08:23

## 2023-01-01 RX ADMIN — METHOCARBAMOL TABLETS 1000 MG: 750 TABLET, COATED ORAL at 00:55

## 2023-01-01 RX ADMIN — LIDOCAINE 2 PATCH: 50 PATCH TOPICAL at 22:08

## 2023-01-01 RX ADMIN — PANTOPRAZOLE SODIUM 40 MG: 40 TABLET, DELAYED RELEASE ORAL at 05:56

## 2023-01-01 RX ADMIN — POLYETHYLENE GLYCOL 3350 17 G: 17 POWDER, FOR SOLUTION ORAL at 23:44

## 2023-01-01 RX ADMIN — LORAZEPAM 1 MG: 2 INJECTION INTRAMUSCULAR; INTRAVENOUS at 22:38

## 2023-01-01 RX ADMIN — HYDROMORPHONE HYDROCHLORIDE 1 MG: 1 INJECTION, SOLUTION INTRAMUSCULAR; INTRAVENOUS; SUBCUTANEOUS at 22:38

## 2023-01-01 RX ADMIN — PANCRELIPASE 12000 UNITS OF LIPASE: 60000; 12000; 38000 CAPSULE, DELAYED RELEASE PELLETS ORAL at 11:28

## 2023-01-01 RX ADMIN — METHOCARBAMOL TABLETS 1000 MG: 750 TABLET, COATED ORAL at 17:38

## 2023-01-01 RX ADMIN — METOPROLOL TARTRATE 50 MG: 50 TABLET, FILM COATED ORAL at 23:23

## 2023-01-01 RX ADMIN — METHOCARBAMOL TABLETS 1000 MG: 750 TABLET, COATED ORAL at 12:02

## 2023-01-01 RX ADMIN — ROSUVASTATIN CALCIUM 40 MG: 40 TABLET, FILM COATED ORAL at 08:30

## 2023-01-01 RX ADMIN — HYDROMORPHONE HYDROCHLORIDE 1 MG: 1 INJECTION, SOLUTION INTRAMUSCULAR; INTRAVENOUS; SUBCUTANEOUS at 23:14

## 2023-01-01 RX ADMIN — METHOCARBAMOL TABLETS 750 MG: 750 TABLET, COATED ORAL at 23:53

## 2023-01-01 RX ADMIN — METHOCARBAMOL 750 MG: 750 TABLET ORAL at 19:11

## 2023-01-01 RX ADMIN — PANCRELIPASE 12000 UNITS OF LIPASE: 60000; 12000; 38000 CAPSULE, DELAYED RELEASE PELLETS ORAL at 17:45

## 2023-01-01 RX ADMIN — Medication 10 ML: at 21:42

## 2023-01-01 RX ADMIN — OXYCODONE HYDROCHLORIDE 10 MG: 5 TABLET ORAL at 17:31

## 2023-01-01 RX ADMIN — METHOCARBAMOL TABLETS 1000 MG: 750 TABLET, COATED ORAL at 23:08

## 2023-01-01 RX ADMIN — METHOCARBAMOL TABLETS 750 MG: 750 TABLET, COATED ORAL at 05:40

## 2023-01-01 RX ADMIN — DEXAMETHASONE 4 MG: 4 TABLET ORAL at 00:03

## 2023-01-01 RX ADMIN — PREGABALIN 50 MG: 50 CAPSULE ORAL at 09:45

## 2023-01-01 RX ADMIN — SENNOSIDES AND DOCUSATE SODIUM 2 TABLET: 50; 8.6 TABLET ORAL at 21:42

## 2023-01-01 RX ADMIN — Medication 10 ML: at 08:47

## 2023-01-01 RX ADMIN — HYDROMORPHONE HYDROCHLORIDE 1 MG: 1 INJECTION, SOLUTION INTRAMUSCULAR; INTRAVENOUS; SUBCUTANEOUS at 22:40

## 2023-01-01 RX ADMIN — ROSUVASTATIN CALCIUM 40 MG: 40 TABLET, FILM COATED ORAL at 10:27

## 2023-01-01 RX ADMIN — DEXAMETHASONE 4 MG: 4 TABLET ORAL at 12:08

## 2023-01-01 RX ADMIN — METOPROLOL TARTRATE 5 MG: 1 INJECTION, SOLUTION INTRAVENOUS at 08:18

## 2023-01-01 RX ADMIN — HYDROMORPHONE HYDROCHLORIDE 1 MG: 1 INJECTION, SOLUTION INTRAMUSCULAR; INTRAVENOUS; SUBCUTANEOUS at 18:42

## 2023-01-01 RX ADMIN — MESALAMINE 1.2 G: 1.2 TABLET, DELAYED RELEASE ORAL at 09:29

## 2023-01-01 RX ADMIN — HYDROMORPHONE HYDROCHLORIDE 0.5 MG: 1 INJECTION, SOLUTION INTRAMUSCULAR; INTRAVENOUS; SUBCUTANEOUS at 05:45

## 2023-01-01 RX ADMIN — METHOCARBAMOL TABLETS 750 MG: 750 TABLET, COATED ORAL at 00:00

## 2023-01-01 RX ADMIN — POLYETHYLENE GLYCOL 3350 17 G: 17 POWDER, FOR SOLUTION ORAL at 22:50

## 2023-01-01 RX ADMIN — PANTOPRAZOLE SODIUM 40 MG: 40 TABLET, DELAYED RELEASE ORAL at 06:17

## 2023-01-01 RX ADMIN — HYDROMORPHONE HYDROCHLORIDE 0.5 MG: 1 INJECTION, SOLUTION INTRAMUSCULAR; INTRAVENOUS; SUBCUTANEOUS at 12:46

## 2023-01-01 RX ADMIN — SENNOSIDES AND DOCUSATE SODIUM 2 TABLET: 50; 8.6 TABLET ORAL at 22:08

## 2023-01-01 RX ADMIN — METOPROLOL TARTRATE 50 MG: 50 TABLET, FILM COATED ORAL at 18:25

## 2023-01-01 RX ADMIN — SODIUM CHLORIDE, POTASSIUM CHLORIDE, SODIUM LACTATE AND CALCIUM CHLORIDE 75 ML/HR: 600; 310; 30; 20 INJECTION, SOLUTION INTRAVENOUS at 15:53

## 2023-01-01 RX ADMIN — OXYCODONE HYDROCHLORIDE 10 MG: 5 TABLET ORAL at 13:57

## 2023-01-06 RX ORDER — AMLODIPINE BESYLATE 5 MG/1
TABLET ORAL
Qty: 30 TABLET | Refills: 0 | Status: SHIPPED | OUTPATIENT
Start: 2023-01-06 | End: 2023-03-22 | Stop reason: SDUPTHER

## 2023-02-10 ENCOUNTER — TRANSCRIBE ORDERS (OUTPATIENT)
Dept: ADMINISTRATIVE | Facility: HOSPITAL | Age: 73
End: 2023-02-10
Payer: COMMERCIAL

## 2023-02-10 ENCOUNTER — LAB (OUTPATIENT)
Dept: LAB | Facility: HOSPITAL | Age: 73
End: 2023-02-10
Payer: MEDICARE

## 2023-02-10 DIAGNOSIS — E55.9 VITAMIN D DEFICIENCY DISEASE: ICD-10-CM

## 2023-02-10 DIAGNOSIS — N18.32 CHRONIC KIDNEY DISEASE (CKD) STAGE G3B/A1, MODERATELY DECREASED GLOMERULAR FILTRATION RATE (GFR) BETWEEN 30-44 ML/MIN/1.73 SQUARE METER AND ALBUMINURIA CREATININE RATIO LESS THAN 30 MG/G (CMS/H*: ICD-10-CM

## 2023-02-10 DIAGNOSIS — N18.32 CHRONIC KIDNEY DISEASE (CKD) STAGE G3B/A1, MODERATELY DECREASED GLOMERULAR FILTRATION RATE (GFR) BETWEEN 30-44 ML/MIN/1.73 SQUARE METER AND ALBUMINURIA CREATININE RATIO LESS THAN 30 MG/G (CMS/H*: Primary | ICD-10-CM

## 2023-02-10 LAB
25(OH)D3 SERPL-MCNC: 25.8 NG/ML (ref 30–100)
ALBUMIN SERPL-MCNC: 4.3 G/DL (ref 3.5–5.2)
ANION GAP SERPL CALCULATED.3IONS-SCNC: 9.5 MMOL/L (ref 5–15)
BACTERIA UR QL AUTO: NORMAL /HPF
BILIRUB UR QL STRIP: NEGATIVE
BUN SERPL-MCNC: 24 MG/DL (ref 8–23)
BUN/CREAT SERPL: 16.8 (ref 7–25)
CALCIUM SPEC-SCNC: 9.4 MG/DL (ref 8.6–10.5)
CALCIUM SPEC-SCNC: 9.7 MG/DL (ref 8.6–10.5)
CHLORIDE SERPL-SCNC: 103 MMOL/L (ref 98–107)
CLARITY UR: CLEAR
CO2 SERPL-SCNC: 25.5 MMOL/L (ref 22–29)
COLOR UR: YELLOW
CREAT SERPL-MCNC: 1.43 MG/DL (ref 0.76–1.27)
CREAT UR-MCNC: 30 MG/DL
DEPRECATED RDW RBC AUTO: 44.8 FL (ref 37–54)
EGFRCR SERPLBLD CKD-EPI 2021: 52.4 ML/MIN/1.73
ERYTHROCYTE [DISTWIDTH] IN BLOOD BY AUTOMATED COUNT: 13.5 % (ref 12.3–15.4)
GLUCOSE SERPL-MCNC: 118 MG/DL (ref 65–99)
GLUCOSE UR STRIP-MCNC: NEGATIVE MG/DL
HCT VFR BLD AUTO: 42.5 % (ref 37.5–51)
HGB BLD-MCNC: 14.5 G/DL (ref 13–17.7)
HGB UR QL STRIP.AUTO: NEGATIVE
HYALINE CASTS UR QL AUTO: NORMAL /LPF
KETONES UR QL STRIP: NEGATIVE
LEUKOCYTE ESTERASE UR QL STRIP.AUTO: NEGATIVE
MAGNESIUM SERPL-MCNC: 2.1 MG/DL (ref 1.6–2.4)
MCH RBC QN AUTO: 31 PG (ref 26.6–33)
MCHC RBC AUTO-ENTMCNC: 34.1 G/DL (ref 31.5–35.7)
MCV RBC AUTO: 91 FL (ref 79–97)
NITRITE UR QL STRIP: NEGATIVE
PH UR STRIP.AUTO: 7 [PH] (ref 5–8)
PHOSPHATE SERPL-MCNC: 3.4 MG/DL (ref 2.5–4.5)
PLATELET # BLD AUTO: 154 10*3/MM3 (ref 140–450)
PMV BLD AUTO: 9.4 FL (ref 6–12)
POTASSIUM SERPL-SCNC: 4.6 MMOL/L (ref 3.5–5.2)
PROT ?TM UR-MCNC: 77.6 MG/DL
PROT UR QL STRIP: ABNORMAL
PROT/CREAT UR: 2586.7 MG/G CREA (ref 0–200)
PTH-INTACT SERPL-MCNC: 80.7 PG/ML (ref 15–65)
RBC # BLD AUTO: 4.67 10*6/MM3 (ref 4.14–5.8)
RBC # UR STRIP: NORMAL /HPF
REF LAB TEST METHOD: NORMAL
SODIUM SERPL-SCNC: 138 MMOL/L (ref 136–145)
SP GR UR STRIP: 1.01 (ref 1–1.03)
SQUAMOUS #/AREA URNS HPF: NORMAL /HPF
UROBILINOGEN UR QL STRIP: ABNORMAL
WBC # UR STRIP: NORMAL /HPF
WBC NRBC COR # BLD: 5.99 10*3/MM3 (ref 3.4–10.8)

## 2023-02-10 PROCEDURE — 82306 VITAMIN D 25 HYDROXY: CPT

## 2023-02-10 PROCEDURE — 81001 URINALYSIS AUTO W/SCOPE: CPT

## 2023-02-10 PROCEDURE — 84156 ASSAY OF PROTEIN URINE: CPT

## 2023-02-10 PROCEDURE — 80069 RENAL FUNCTION PANEL: CPT

## 2023-02-10 PROCEDURE — 36415 COLL VENOUS BLD VENIPUNCTURE: CPT

## 2023-02-10 PROCEDURE — 83735 ASSAY OF MAGNESIUM: CPT

## 2023-02-10 PROCEDURE — 82570 ASSAY OF URINE CREATININE: CPT

## 2023-02-10 PROCEDURE — 82310 ASSAY OF CALCIUM: CPT

## 2023-02-10 PROCEDURE — 85027 COMPLETE CBC AUTOMATED: CPT

## 2023-02-10 PROCEDURE — 83970 ASSAY OF PARATHORMONE: CPT

## 2023-02-21 RX ORDER — ROSUVASTATIN CALCIUM 20 MG/1
20 TABLET, COATED ORAL NIGHTLY
Qty: 30 TABLET | Refills: 11 | Status: SHIPPED | OUTPATIENT
Start: 2023-02-21 | End: 2023-04-05 | Stop reason: SDUPTHER

## 2023-03-22 RX ORDER — AMLODIPINE BESYLATE 5 MG/1
5 TABLET ORAL DAILY
Qty: 15 TABLET | Refills: 0 | Status: SHIPPED | OUTPATIENT
Start: 2023-03-22 | End: 2023-04-05 | Stop reason: SDUPTHER

## 2023-03-22 NOTE — TELEPHONE ENCOUNTER
Caller: Bob Monahan    Relationship: Self    Best call back number: [phone]    Requested Prescriptions:   Requested Prescriptions     Pending Prescriptions Disp Refills   • amLODIPine (NORVASC) 5 MG tablet 30 tablet 0     Sig: Take 1 tablet by mouth Daily. for blood pressure        Pharmacy where request should be sent: [id number] [address] AT [facility] - [phone] [address] [place] [phone]      Last office visit with prescribing clinician: 2/7/2022   Last telemedicine visit with prescribing clinician: Visit date not found   Next office visit with prescribing clinician: Visit date not found     Additional details provided by patient: COMPLETELY OUT     Does the patient have less than a 3 day supply:  [x] Yes  [] No    Would you like a call back once the refill request has been completed: [x] Yes [] No    If the office needs to give you a call back, can they leave a voicemail: [x] Yes [] No    Adelita Reyna Rep   03/22/23 09:25 EDT

## 2023-04-04 RX ORDER — AMLODIPINE BESYLATE 5 MG/1
5 TABLET ORAL DAILY
Qty: 15 TABLET | Refills: 0 | OUTPATIENT
Start: 2023-04-04

## 2023-04-05 ENCOUNTER — OFFICE VISIT (OUTPATIENT)
Dept: FAMILY MEDICINE CLINIC | Facility: CLINIC | Age: 73
End: 2023-04-05
Payer: MEDICARE

## 2023-04-05 VITALS
BODY MASS INDEX: 26.16 KG/M2 | HEIGHT: 65 IN | HEART RATE: 88 BPM | WEIGHT: 157 LBS | TEMPERATURE: 97.8 F | OXYGEN SATURATION: 98 % | SYSTOLIC BLOOD PRESSURE: 108 MMHG | DIASTOLIC BLOOD PRESSURE: 88 MMHG | RESPIRATION RATE: 16 BRPM

## 2023-04-05 DIAGNOSIS — I10 BENIGN ESSENTIAL HTN: Primary | ICD-10-CM

## 2023-04-05 DIAGNOSIS — I65.22 STENOSIS OF LEFT CAROTID ARTERY: ICD-10-CM

## 2023-04-05 DIAGNOSIS — N18.32 CHRONIC RENAL IMPAIRMENT, STAGE 3B: ICD-10-CM

## 2023-04-05 DIAGNOSIS — E78.2 HYPERLIPIDEMIA, MIXED: ICD-10-CM

## 2023-04-05 DIAGNOSIS — I25.10 CORONARY ARTERY DISEASE INVOLVING NATIVE CORONARY ARTERY OF NATIVE HEART WITHOUT ANGINA PECTORIS: ICD-10-CM

## 2023-04-05 DIAGNOSIS — R73.01 IMPAIRED FASTING GLUCOSE: ICD-10-CM

## 2023-04-05 DIAGNOSIS — R13.19 ESOPHAGEAL DYSPHAGIA: ICD-10-CM

## 2023-04-05 DIAGNOSIS — Z95.2 HISTORY OF AORTIC VALVE REPLACEMENT: ICD-10-CM

## 2023-04-05 RX ORDER — LANCETS
EACH MISCELLANEOUS
Qty: 100 EACH | Refills: 11 | Status: SHIPPED | OUTPATIENT
Start: 2023-04-05

## 2023-04-05 RX ORDER — CLOPIDOGREL BISULFATE 75 MG/1
75 TABLET ORAL DAILY
Qty: 90 TABLET | Refills: 3 | Status: SHIPPED | OUTPATIENT
Start: 2023-04-05

## 2023-04-05 RX ORDER — AMLODIPINE BESYLATE 5 MG/1
5 TABLET ORAL DAILY
Qty: 90 TABLET | Refills: 1 | Status: SHIPPED | OUTPATIENT
Start: 2023-04-05

## 2023-04-05 RX ORDER — ROSUVASTATIN CALCIUM 20 MG/1
20 TABLET, COATED ORAL NIGHTLY
Qty: 90 TABLET | Refills: 3 | Status: SHIPPED | OUTPATIENT
Start: 2023-04-05 | End: 2024-04-04

## 2023-04-05 NOTE — PROGRESS NOTES
"Subjective   Bob Monahan is a 72 y.o. male.     History of Present Illness    Since the last visit, he has overall felt fairly well.  He has Primary Hypertension and well controlled on current medication, Impaired fasting glucose and will monitor labs to watch for DMII, GERD controlled on PPI Rx, Hyperlipidemia with goals met with current Rx, CAD and remains under care of their Cardiologist for mangement, CAD and remains on medication regimen and Vitamin D deficiency and labs are at goal >30 ng/mL.  he has been compliant with current medications have reviewed them.  The patient denies medication side effects.  Will refill medications. /88   Pulse 88   Temp 97.8 °F (36.6 °C)   Resp 16   Ht 165.1 cm (65\")   Wt 71.2 kg (157 lb)   SpO2 98%   BMI 26.13 kg/m²     Results for orders placed or performed in visit on 02/10/23   Renal Function Panel    Specimen: Blood   Result Value Ref Range    Glucose 118 (H) 65 - 99 mg/dL    BUN 24 (H) 8 - 23 mg/dL    Creatinine 1.43 (H) 0.76 - 1.27 mg/dL    Sodium 138 136 - 145 mmol/L    Potassium 4.6 3.5 - 5.2 mmol/L    Chloride 103 98 - 107 mmol/L    CO2 25.5 22.0 - 29.0 mmol/L    Calcium 9.7 8.6 - 10.5 mg/dL    Albumin 4.3 3.5 - 5.2 g/dL    Phosphorus 3.4 2.5 - 4.5 mg/dL    Anion Gap 9.5 5.0 - 15.0 mmol/L    BUN/Creatinine Ratio 16.8 7.0 - 25.0    eGFR 52.4 (L) >60.0 mL/min/1.73   Urinalysis With Microscopic If Indicated (No Culture) - Urine, Clean Catch    Specimen: Urine, Clean Catch   Result Value Ref Range    Color, UA Yellow Yellow, Straw    Appearance, UA Clear Clear    pH, UA 7.0 5.0 - 8.0    Specific Gravity, UA 1.010 1.005 - 1.030    Glucose, UA Negative Negative    Ketones, UA Negative Negative    Bilirubin, UA Negative Negative    Blood, UA Negative Negative    Protein,  mg/dL (2+) (A) Negative    Leuk Esterase, UA Negative Negative    Nitrite, UA Negative Negative    Urobilinogen, UA 0.2 E.U./dL 0.2 - 1.0 E.U./dL   Vitamin D 25 Hydroxy    Specimen: " Blood   Result Value Ref Range    25 Hydroxy, Vitamin D 25.8 (L) 30.0 - 100.0 ng/ml   CBC (No Diff)    Specimen: Blood   Result Value Ref Range    WBC 5.99 3.40 - 10.80 10*3/mm3    RBC 4.67 4.14 - 5.80 10*6/mm3    Hemoglobin 14.5 13.0 - 17.7 g/dL    Hematocrit 42.5 37.5 - 51.0 %    MCV 91.0 79.0 - 97.0 fL    MCH 31.0 26.6 - 33.0 pg    MCHC 34.1 31.5 - 35.7 g/dL    RDW 13.5 12.3 - 15.4 %    RDW-SD 44.8 37.0 - 54.0 fl    MPV 9.4 6.0 - 12.0 fL    Platelets 154 140 - 450 10*3/mm3   PTH, Intact & Calcium    Specimen: Blood   Result Value Ref Range    PTH, Intact 80.7 (H) 15.0 - 65.0 pg/mL    Calcium 9.4 8.6 - 10.5 mg/dL   Magnesium    Specimen: Blood   Result Value Ref Range    Magnesium 2.1 1.6 - 2.4 mg/dL   Protein / Creatinine Ratio, Urine - Urine, Clean Catch    Specimen: Urine, Clean Catch   Result Value Ref Range    Protein/Creatinine Ratio, Urine 2,586.7 (H) 0.0 - 200.0 mg/G Crea    Creatinine, Urine 30.0 mg/dL    Total Protein, Urine 77.6 mg/dL   Urinalysis, Microscopic Only - Urine, Clean Catch    Specimen: Urine, Clean Catch   Result Value Ref Range    RBC, UA 0-2 None Seen, 0-2 /HPF    WBC, UA 0-2 None Seen, 0-2 /HPF    Bacteria, UA None Seen None Seen /HPF    Squamous Epithelial Cells, UA 0-2 None Seen, 0-2 /HPF    Hyaline Casts, UA None Seen None Seen /LPF    Methodology Automated Microscopy        Saw GI Dr. Barron 12/21/2022 noting history of colitis and continued on Fernando thalami and for treatment and noted patient had seen Dr. Dennis for follow-up benign-appearing pancreatic mass-----also noting patient was started on Nexium and follow-up 6 months  Saw Karly for nasal congestion on 11/10/2022 you treated with Tessalon Perles  Had follow-up with nephrology APRN on 8/26/2022 noting chronic kidney disease stage III secondary to hypertension noting labs were stable and she was going to obtain bone density and labs for anemia on next visit noted blood pressure was at goal and no change in  medications.  Patient saw [place] cardiologist Dr. Huitron for follow-up status post CABG CAD and chronic diastolic heart failure.  History of aortic valve replacement 2020.  No change in medication regimen and continue follow-up  Sees Dr Kinney for vascular  Also sees vascular for left subclavian artery steal phenomenon, carotid artery stenosis with surgical repair left, peripheral artery disease.    He cont to see nephrologist; DR Irving  He does see urologist; DR Joseph  cardiologist Dr. Huitron----8-1-21---CAD---next appt Sam[facility]   saw Dr Tidwell 11-4-22--eval pulmonary nodule and note his findings on CT scan and pulmonary fibrosis of the lungs.  Still need DR Dennis  The following portions of the patient's history were reviewed and updated as appropriate: allergies, current medications, past family history, past medical history, past social history, past surgical history and problem list.    Review of Systems   Constitutional: Negative for activity change, appetite change and unexpected weight change.   HENT: Negative for nosebleeds and trouble swallowing.    Eyes: Negative for pain and visual disturbance.   Respiratory: Negative for chest tightness, shortness of breath and wheezing.    Cardiovascular: Negative for chest pain and palpitations.   Gastrointestinal: Negative for abdominal pain and blood in stool.   Endocrine: Negative.    Genitourinary: Negative for difficulty urinating and hematuria.   Skin: Negative for color change and rash.   Allergic/Immunologic: Negative.    Neurological: Negative for syncope and speech difficulty.   Hematological: Negative for adenopathy.   Psychiatric/Behavioral: Negative for agitation and confusion.   All other systems reviewed and are negative.      Objective   Physical Exam  Vitals and nursing note reviewed.   Constitutional:       General: He is not in acute distress.     Appearance: Normal appearance. He is well-developed.      Comments: Looks age   HENT:      Head:  Normocephalic and atraumatic.      Right Ear: External ear normal.      Left Ear: External ear normal.      Nose: Nose normal.   Eyes:      General: No scleral icterus.        Right eye: No discharge.         Left eye: No discharge.      Conjunctiva/sclera: Conjunctivae normal.      Pupils: Pupils are equal, round, and reactive to light.   Neck:      Thyroid: No thyromegaly.      Vascular: Carotid bruit present.      Trachea: No tracheal deviation.   Cardiovascular:      Rate and Rhythm: Normal rate and regular rhythm.      Pulses: Normal pulses.      Heart sounds: Murmur heard.     No gallop.      Comments: Decreased pulse left dorsalis pedis and posterior tibia--- this is chronic  Trace pedal edema = bilat  Pulmonary:      Effort: Pulmonary effort is normal. No respiratory distress.      Breath sounds: Normal breath sounds. No wheezing or rales.   Abdominal:      Palpations: Abdomen is soft.   Musculoskeletal:         General: Normal range of motion.      Cervical back: Normal range of motion and neck supple.      Right lower leg: No edema.      Left lower leg: No edema.   Skin:     General: Skin is warm.      Coloration: Skin is not pale.      Findings: No erythema or rash.   Neurological:      General: No focal deficit present.      Mental Status: He is alert. Mental status is at baseline.      Motor: No abnormal muscle tone.      Coordination: Coordination normal.   Psychiatric:         Mood and Affect: Mood normal.         Behavior: Behavior normal.         Thought Content: Thought content normal.         Judgment: Judgment normal.         Assessment & Plan   Diagnoses and all orders for this visit:    1. Benign essential HTN (Primary)  -     amLODIPine (NORVASC) 5 MG tablet; Take 1 tablet by mouth Daily. for blood pressure  Dispense: 90 tablet; Refill: 1    2. Stenosis of left carotid artery    3. Esophageal dysphagia    4. Coronary artery disease involving native coronary artery of native heart without  angina pectoris    5. Impaired fasting glucose    6. Chronic renal impairment, stage 3b    7. History of aortic valve replacement    8. Hyperlipidemia, mixed    Other orders  -     rosuvastatin (Crestor) 20 MG tablet; Take 1 tablet by mouth Every Night. For cholesterol  Dispense: 90 tablet; Refill: 3  -     clopidogrel (PLAVIX) 75 MG tablet; Take 1 tablet by mouth Daily. For heart  Dispense: 90 tablet; Refill: 3  -     glucose blood test strip; R73.1 check glucose daily and PRN for impaired fasting glucose  Dispense: 100 each; Refill: 12  -     Lancets Thin misc; One daily for DMII and PRN for R73.01  Dispense: 100 each; Refill: 11    Plan, Bob Monahan, was seen today.  he was seen for HTN and continue medication, Imparied fasting glucose and plan follow up labs, diet, and exercise, GERD and will continue on PPI medication, Hyperlipidemia and will continue current medication, CAD and is currently stable on medication management and stable, CAD and is under care of their Cardiologist for medical management and stable and Vitamin D deficiency and supplemented.  Sees GI---still on mesalamine; ----Nexium--stable---he is to do colonoscopy---DR Barron  Want him to f/u Dr Dennis for pancreatic cyst f/u  Has f/u with DR Tidwell---monitors Interstitial Fibrosis  Has follow-up with nephrology chronic kidney disease every 6 months left subclavian artery steal phenomenon, carotid artery stenosis with surgical repair left, peripheral artery disease.    Needs to see vascular yearly--also remains on generic Pletal and Plavix  Sees urologist yearly for prostate

## 2023-04-24 RX ORDER — BLOOD SUGAR DIAGNOSTIC
STRIP MISCELLANEOUS
Qty: 100 EACH | Refills: 3 | Status: SHIPPED | OUTPATIENT
Start: 2023-04-24

## 2023-05-05 ENCOUNTER — LAB (OUTPATIENT)
Dept: LAB | Facility: HOSPITAL | Age: 73
End: 2023-05-05
Payer: MEDICARE

## 2023-05-05 ENCOUNTER — APPOINTMENT (OUTPATIENT)
Dept: CT IMAGING | Facility: HOSPITAL | Age: 73
End: 2023-05-05
Payer: MEDICARE

## 2023-05-05 ENCOUNTER — APPOINTMENT (OUTPATIENT)
Dept: MRI IMAGING | Facility: HOSPITAL | Age: 73
End: 2023-05-05
Payer: MEDICARE

## 2023-05-05 ENCOUNTER — HOSPITAL ENCOUNTER (EMERGENCY)
Facility: HOSPITAL | Age: 73
Discharge: HOME | End: 2023-05-05
Attending: EMERGENCY MEDICINE
Payer: MEDICARE

## 2023-05-05 ENCOUNTER — HOSPITAL ENCOUNTER (OUTPATIENT)
Dept: CT IMAGING | Facility: HOSPITAL | Age: 73
Discharge: HOME | End: 2023-05-05
Payer: MEDICARE

## 2023-05-05 VITALS
HEART RATE: 85 BPM | WEIGHT: 150 LBS | OXYGEN SATURATION: 97 % | BODY MASS INDEX: 24.99 KG/M2 | RESPIRATION RATE: 16 BRPM | DIASTOLIC BLOOD PRESSURE: 90 MMHG | HEIGHT: 65 IN | TEMPERATURE: 97.3 F | SYSTOLIC BLOOD PRESSURE: 138 MMHG

## 2023-05-05 DIAGNOSIS — R93.89 ABNORMAL MRI: ICD-10-CM

## 2023-05-05 DIAGNOSIS — R91.1 PULMONARY NODULE: ICD-10-CM

## 2023-05-05 DIAGNOSIS — S32.010A CLOSED COMPRESSION FRACTURE OF BODY OF L1 VERTEBRA: Primary | ICD-10-CM

## 2023-05-05 DIAGNOSIS — N28.9 RENAL INSUFFICIENCY: ICD-10-CM

## 2023-05-05 LAB
ALBUMIN SERPL-MCNC: 4.4 G/DL (ref 3.5–5.2)
ALBUMIN SERPL-MCNC: 4.4 G/DL (ref 3.5–5.2)
ALBUMIN/GLOB SERPL: 1.3 G/DL
ALBUMIN/GLOB SERPL: 1.6 G/DL
ALP SERPL-CCNC: 84 U/L (ref 39–117)
ALP SERPL-CCNC: 86 U/L (ref 39–117)
ALT SERPL W P-5'-P-CCNC: 17 U/L (ref 1–41)
ALT SERPL W P-5'-P-CCNC: 17 U/L (ref 1–41)
ANION GAP SERPL CALCULATED.3IONS-SCNC: 10 MMOL/L (ref 5–15)
ANION GAP SERPL CALCULATED.3IONS-SCNC: 13.4 MMOL/L (ref 5–15)
AST SERPL-CCNC: 16 U/L (ref 1–40)
AST SERPL-CCNC: 19 U/L (ref 1–40)
BASOPHILS # BLD AUTO: 0.04 10*3/MM3 (ref 0–0.2)
BASOPHILS # BLD AUTO: 0.04 10*3/MM3 (ref 0–0.2)
BASOPHILS NFR BLD AUTO: 0.6 % (ref 0–1.5)
BASOPHILS NFR BLD AUTO: 0.6 % (ref 0–1.5)
BILIRUB SERPL-MCNC: 0.3 MG/DL (ref 0–1.2)
BILIRUB SERPL-MCNC: 0.4 MG/DL (ref 0–1.2)
BUN SERPL-MCNC: 34 MG/DL (ref 8–23)
BUN SERPL-MCNC: 35 MG/DL (ref 8–23)
BUN/CREAT SERPL: 22.7 (ref 7–25)
BUN/CREAT SERPL: 24.3 (ref 7–25)
CALCIUM SPEC-SCNC: 10.4 MG/DL (ref 8.6–10.5)
CALCIUM SPEC-SCNC: 9.6 MG/DL (ref 8.6–10.5)
CHLORIDE SERPL-SCNC: 102 MMOL/L (ref 98–107)
CHLORIDE SERPL-SCNC: 103 MMOL/L (ref 98–107)
CHOLEST SERPL-MCNC: 148 MG/DL (ref 0–200)
CO2 SERPL-SCNC: 24.6 MMOL/L (ref 22–29)
CO2 SERPL-SCNC: 25 MMOL/L (ref 22–29)
CREAT SERPL-MCNC: 1.44 MG/DL (ref 0.76–1.27)
CREAT SERPL-MCNC: 1.5 MG/DL (ref 0.76–1.27)
DEPRECATED RDW RBC AUTO: 44 FL (ref 37–54)
DEPRECATED RDW RBC AUTO: 44.3 FL (ref 37–54)
EGFRCR SERPLBLD CKD-EPI 2021: 49.2 ML/MIN/1.73
EGFRCR SERPLBLD CKD-EPI 2021: 51.6 ML/MIN/1.73
EOSINOPHIL # BLD AUTO: 0.04 10*3/MM3 (ref 0–0.4)
EOSINOPHIL # BLD AUTO: 0.04 10*3/MM3 (ref 0–0.4)
EOSINOPHIL NFR BLD AUTO: 0.6 % (ref 0.3–6.2)
EOSINOPHIL NFR BLD AUTO: 0.6 % (ref 0.3–6.2)
ERYTHROCYTE [DISTWIDTH] IN BLOOD BY AUTOMATED COUNT: 13.3 % (ref 12.3–15.4)
ERYTHROCYTE [DISTWIDTH] IN BLOOD BY AUTOMATED COUNT: 13.6 % (ref 12.3–15.4)
FOLATE SERPL-MCNC: 6.56 NG/ML (ref 4.78–24.2)
GLOBULIN UR ELPH-MCNC: 2.8 GM/DL
GLOBULIN UR ELPH-MCNC: 3.3 GM/DL
GLUCOSE SERPL-MCNC: 130 MG/DL (ref 65–99)
GLUCOSE SERPL-MCNC: 178 MG/DL (ref 65–99)
HBA1C MFR BLD: 6 % (ref 4.8–5.6)
HCT VFR BLD AUTO: 37.2 % (ref 37.5–51)
HCT VFR BLD AUTO: 38.9 % (ref 37.5–51)
HDLC SERPL-MCNC: 51 MG/DL (ref 40–60)
HGB BLD-MCNC: 13.2 G/DL (ref 13–17.7)
HGB BLD-MCNC: 13.4 G/DL (ref 13–17.7)
IMM GRANULOCYTES # BLD AUTO: 0.05 10*3/MM3 (ref 0–0.05)
IMM GRANULOCYTES # BLD AUTO: 0.05 10*3/MM3 (ref 0–0.05)
IMM GRANULOCYTES NFR BLD AUTO: 0.7 % (ref 0–0.5)
IMM GRANULOCYTES NFR BLD AUTO: 0.7 % (ref 0–0.5)
LDLC SERPL CALC-MCNC: 75 MG/DL (ref 0–100)
LDLC/HDLC SERPL: 1.4 {RATIO}
LYMPHOCYTES # BLD AUTO: 1.2 10*3/MM3 (ref 0.7–3.1)
LYMPHOCYTES # BLD AUTO: 1.36 10*3/MM3 (ref 0.7–3.1)
LYMPHOCYTES NFR BLD AUTO: 16.8 % (ref 19.6–45.3)
LYMPHOCYTES NFR BLD AUTO: 18.8 % (ref 19.6–45.3)
MAGNESIUM SERPL-MCNC: 2.4 MG/DL (ref 1.6–2.4)
MCH RBC QN AUTO: 31.2 PG (ref 26.6–33)
MCH RBC QN AUTO: 31.9 PG (ref 26.6–33)
MCHC RBC AUTO-ENTMCNC: 34.4 G/DL (ref 31.5–35.7)
MCHC RBC AUTO-ENTMCNC: 35.5 G/DL (ref 31.5–35.7)
MCV RBC AUTO: 89.9 FL (ref 79–97)
MCV RBC AUTO: 90.5 FL (ref 79–97)
MONOCYTES # BLD AUTO: 0.52 10*3/MM3 (ref 0.1–0.9)
MONOCYTES # BLD AUTO: 0.53 10*3/MM3 (ref 0.1–0.9)
MONOCYTES NFR BLD AUTO: 7.3 % (ref 5–12)
MONOCYTES NFR BLD AUTO: 7.3 % (ref 5–12)
NEUTROPHILS NFR BLD AUTO: 5.21 10*3/MM3 (ref 1.7–7)
NEUTROPHILS NFR BLD AUTO: 5.28 10*3/MM3 (ref 1.7–7)
NEUTROPHILS NFR BLD AUTO: 72 % (ref 42.7–76)
NEUTROPHILS NFR BLD AUTO: 74 % (ref 42.7–76)
NRBC BLD AUTO-RTO: 0 /100 WBC (ref 0–0.2)
NRBC BLD AUTO-RTO: 0 /100 WBC (ref 0–0.2)
PLATELET # BLD AUTO: 166 10*3/MM3 (ref 140–450)
PLATELET # BLD AUTO: 182 10*3/MM3 (ref 140–450)
PMV BLD AUTO: 9.1 FL (ref 6–12)
PMV BLD AUTO: 9.1 FL (ref 6–12)
POTASSIUM SERPL-SCNC: 4.4 MMOL/L (ref 3.5–5.2)
POTASSIUM SERPL-SCNC: 4.7 MMOL/L (ref 3.5–5.2)
PROT SERPL-MCNC: 7.2 G/DL (ref 6–8.5)
PROT SERPL-MCNC: 7.7 G/DL (ref 6–8.5)
RBC # BLD AUTO: 4.14 10*6/MM3 (ref 4.14–5.8)
RBC # BLD AUTO: 4.3 10*6/MM3 (ref 4.14–5.8)
SODIUM SERPL-SCNC: 138 MMOL/L (ref 136–145)
SODIUM SERPL-SCNC: 140 MMOL/L (ref 136–145)
T4 FREE SERPL-MCNC: 1.26 NG/DL (ref 0.93–1.7)
TRIGL SERPL-MCNC: 127 MG/DL (ref 0–150)
TSH SERPL DL<=0.05 MIU/L-ACNC: 2.82 UIU/ML (ref 0.27–4.2)
VIT B12 BLD-MCNC: 759 PG/ML (ref 211–946)
VLDLC SERPL-MCNC: 22 MG/DL (ref 5–40)
WBC NRBC COR # BLD: 7.13 10*3/MM3 (ref 3.4–10.8)
WBC NRBC COR # BLD: 7.23 10*3/MM3 (ref 3.4–10.8)

## 2023-05-05 PROCEDURE — 96372 THER/PROPH/DIAG INJ SC/IM: CPT

## 2023-05-05 PROCEDURE — 83036 HEMOGLOBIN GLYCOSYLATED A1C: CPT | Performed by: PHYSICIAN ASSISTANT

## 2023-05-05 PROCEDURE — 82746 ASSAY OF FOLIC ACID SERUM: CPT | Performed by: PHYSICIAN ASSISTANT

## 2023-05-05 PROCEDURE — 82607 VITAMIN B-12: CPT | Performed by: PHYSICIAN ASSISTANT

## 2023-05-05 PROCEDURE — 99283 EMERGENCY DEPT VISIT LOW MDM: CPT | Performed by: NURSE PRACTITIONER

## 2023-05-05 PROCEDURE — 85025 COMPLETE CBC W/AUTO DIFF WBC: CPT | Performed by: PHYSICIAN ASSISTANT

## 2023-05-05 PROCEDURE — 84443 ASSAY THYROID STIM HORMONE: CPT | Performed by: PHYSICIAN ASSISTANT

## 2023-05-05 PROCEDURE — 80053 COMPREHEN METABOLIC PANEL: CPT | Performed by: PHYSICIAN ASSISTANT

## 2023-05-05 PROCEDURE — 25010000002 KETOROLAC TROMETHAMINE PER 15 MG: Performed by: PHYSICIAN ASSISTANT

## 2023-05-05 PROCEDURE — 72158 MRI LUMBAR SPINE W/O & W/DYE: CPT

## 2023-05-05 PROCEDURE — 80061 LIPID PANEL: CPT | Performed by: PHYSICIAN ASSISTANT

## 2023-05-05 PROCEDURE — 25010000002 HYDROMORPHONE PER 4 MG: Performed by: EMERGENCY MEDICINE

## 2023-05-05 PROCEDURE — 71250 CT THORAX DX C-: CPT

## 2023-05-05 PROCEDURE — 83735 ASSAY OF MAGNESIUM: CPT | Performed by: PHYSICIAN ASSISTANT

## 2023-05-05 PROCEDURE — A9577 INJ MULTIHANCE: HCPCS | Performed by: EMERGENCY MEDICINE

## 2023-05-05 PROCEDURE — 99283 EMERGENCY DEPT VISIT LOW MDM: CPT

## 2023-05-05 PROCEDURE — 0 GADOBENATE DIMEGLUMINE 529 MG/ML SOLUTION: Performed by: EMERGENCY MEDICINE

## 2023-05-05 PROCEDURE — 72131 CT LUMBAR SPINE W/O DYE: CPT

## 2023-05-05 PROCEDURE — 84439 ASSAY OF FREE THYROXINE: CPT | Performed by: PHYSICIAN ASSISTANT

## 2023-05-05 PROCEDURE — 96374 THER/PROPH/DIAG INJ IV PUSH: CPT

## 2023-05-05 RX ORDER — METHOCARBAMOL 750 MG/1
750 TABLET, FILM COATED ORAL ONCE
Status: COMPLETED | OUTPATIENT
Start: 2023-05-05 | End: 2023-05-05

## 2023-05-05 RX ORDER — LIDOCAINE 50 MG/G
1 PATCH TOPICAL ONCE
Status: DISCONTINUED | OUTPATIENT
Start: 2023-05-05 | End: 2023-05-05 | Stop reason: HOSPADM

## 2023-05-05 RX ORDER — HYDROMORPHONE HYDROCHLORIDE 1 MG/ML
0.5 INJECTION, SOLUTION INTRAMUSCULAR; INTRAVENOUS; SUBCUTANEOUS ONCE
Status: COMPLETED | OUTPATIENT
Start: 2023-05-05 | End: 2023-05-05

## 2023-05-05 RX ORDER — NAPROXEN 500 MG/1
500 TABLET ORAL 2 TIMES DAILY PRN
Qty: 15 TABLET | Refills: 0 | Status: SHIPPED | OUTPATIENT
Start: 2023-05-05 | End: 2023-05-11 | Stop reason: SDUPTHER

## 2023-05-05 RX ORDER — LIDOCAINE 50 MG/G
1 PATCH TOPICAL EVERY 24 HOURS
Qty: 6 EACH | Refills: 0 | OUTPATIENT
Start: 2023-05-05 | End: 2023-05-11

## 2023-05-05 RX ORDER — KETOROLAC TROMETHAMINE 30 MG/ML
30 INJECTION, SOLUTION INTRAMUSCULAR; INTRAVENOUS ONCE
Status: COMPLETED | OUTPATIENT
Start: 2023-05-05 | End: 2023-05-05

## 2023-05-05 RX ORDER — METHOCARBAMOL 750 MG/1
750 TABLET, FILM COATED ORAL 3 TIMES DAILY PRN
Qty: 15 TABLET | Refills: 0 | Status: SHIPPED | OUTPATIENT
Start: 2023-05-05 | End: 2023-05-11 | Stop reason: SDUPTHER

## 2023-05-05 RX ADMIN — METHOCARBAMOL TABLETS 750 MG: 750 TABLET, COATED ORAL at 07:54

## 2023-05-05 RX ADMIN — KETOROLAC TROMETHAMINE 30 MG: 30 INJECTION, SOLUTION INTRAMUSCULAR at 07:54

## 2023-05-05 RX ADMIN — GADOBENATE DIMEGLUMINE 14 ML: 529 INJECTION, SOLUTION INTRAVENOUS at 12:36

## 2023-05-05 RX ADMIN — HYDROMORPHONE HYDROCHLORIDE 0.5 MG: 1 INJECTION, SOLUTION INTRAMUSCULAR; INTRAVENOUS; SUBCUTANEOUS at 13:44

## 2023-05-05 RX ADMIN — LIDOCAINE 1 PATCH: 50 PATCH TOPICAL at 07:55
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
## 2023-05-05 NOTE — ED NOTES
Dae Cornejo was called in regards to the back brace, he advised someone is on the way to our ER. Primary RN notified.
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
## 2023-05-05 NOTE — DISCHARGE INSTRUCTIONS
No lift, push, pull more than 5 pounds, no swimming, no bending or twisting. No exertional or impact activity- walking is OK. Wear brace when sitting higher than 45 degrees, standing, walking. OK to remove brace for showers.

## 2023-05-05 NOTE — ED PROVIDER NOTES
MD ATTESTATION NOTE    The VAN and I have discussed this patient's history, physical exam, and treatment plan.    I provided a substantive portion of the care of this patient. I personally performed the physical exam, in its entirety. The attached note describes my personal findings.      Bob Monahan is a 73 y.o. male who presents to the ED c/o low back pain.  Woke up with pain this morning.  He went to bed feeling fine.  He denies having any bowel or bladder changes.  No leg weakness.  No fever.  No abdominal pain.      On exam:  GENERAL: not distressed  HENT: nares patent  EYES: no scleral icterus  CV: regular rhythm, regular rate  RESPIRATORY: normal effort  ABDOMEN: soft, nontender  MUSCULOSKELETAL: no deformity, no midline spinal tenderness to the T or L-spine  NEURO: alert, moves all extremities, follows commands  5/5 strength to hip flexion, knee extension/flexion, dorsiflexion, plantarflexion, and EHL  1+ patellar reflexes bilaterally  SILT at bilateral superficial peroneal, deep peroneal, sural, and saphenous nerves  SKIN: warm, dry    Labs  Recent Results (from the past 24 hour(s))   Comprehensive metabolic panel    Collection Time: 05/05/23  7:10 AM    Specimen: Blood   Result Value Ref Range    Glucose 178 (H) 65 - 99 mg/dL    BUN 35 (H) 8 - 23 mg/dL    Creatinine 1.44 (H) 0.76 - 1.27 mg/dL    Sodium 140 136 - 145 mmol/L    Potassium 4.7 3.5 - 5.2 mmol/L    Chloride 102 98 - 107 mmol/L    CO2 24.6 22.0 - 29.0 mmol/L    Calcium 10.4 8.6 - 10.5 mg/dL    Total Protein 7.7 6.0 - 8.5 g/dL    Albumin 4.4 3.5 - 5.2 g/dL    ALT (SGPT) 17 1 - 41 U/L    AST (SGOT) 16 1 - 40 U/L    Alkaline Phosphatase 86 39 - 117 U/L    Total Bilirubin 0.4 0.0 - 1.2 mg/dL    Globulin 3.3 gm/dL    A/G Ratio 1.3 g/dL    BUN/Creatinine Ratio 24.3 7.0 - 25.0    Anion Gap 13.4 5.0 - 15.0 mmol/L    eGFR 51.6 (L) >60.0 mL/min/1.73   Lipid panel    Collection Time: 05/05/23  7:10 AM    Specimen: Blood   Result Value Ref Range     Total Cholesterol 148 0 - 200 mg/dL    Triglycerides 127 0 - 150 mg/dL    HDL Cholesterol 51 40 - 60 mg/dL    LDL Cholesterol  75 0 - 100 mg/dL    VLDL Cholesterol 22 5 - 40 mg/dL    LDL/HDL Ratio 1.40    TSH    Collection Time: 05/05/23  7:10 AM    Specimen: Blood   Result Value Ref Range    TSH 2.820 0.270 - 4.200 uIU/mL   Hemoglobin A1c    Collection Time: 05/05/23  7:10 AM    Specimen: Blood   Result Value Ref Range    Hemoglobin A1C 6.00 (H) 4.80 - 5.60 %   Magnesium    Collection Time: 05/05/23  7:10 AM    Specimen: Blood   Result Value Ref Range    Magnesium 2.4 1.6 - 2.4 mg/dL   T4, Free    Collection Time: 05/05/23  7:10 AM    Specimen: Blood   Result Value Ref Range    Free T4 1.26 0.93 - 1.70 ng/dL   Vitamin B12    Collection Time: 05/05/23  7:10 AM    Specimen: Blood   Result Value Ref Range    Vitamin B-12 759 211 - 946 pg/mL   Folate    Collection Time: 05/05/23  7:10 AM    Specimen: Blood   Result Value Ref Range    Folate 6.56 4.78 - 24.20 ng/mL   CBC Auto Differential    Collection Time: 05/05/23  7:10 AM    Specimen: Blood   Result Value Ref Range    WBC 7.23 3.40 - 10.80 10*3/mm3    RBC 4.30 4.14 - 5.80 10*6/mm3    Hemoglobin 13.4 13.0 - 17.7 g/dL    Hematocrit 38.9 37.5 - 51.0 %    MCV 90.5 79.0 - 97.0 fL    MCH 31.2 26.6 - 33.0 pg    MCHC 34.4 31.5 - 35.7 g/dL    RDW 13.6 12.3 - 15.4 %    RDW-SD 44.3 37.0 - 54.0 fl    MPV 9.1 6.0 - 12.0 fL    Platelets 182 140 - 450 10*3/mm3    Neutrophil % 72.0 42.7 - 76.0 %    Lymphocyte % 18.8 (L) 19.6 - 45.3 %    Monocyte % 7.3 5.0 - 12.0 %    Eosinophil % 0.6 0.3 - 6.2 %    Basophil % 0.6 0.0 - 1.5 %    Immature Grans % 0.7 (H) 0.0 - 0.5 %    Neutrophils, Absolute 5.21 1.70 - 7.00 10*3/mm3    Lymphocytes, Absolute 1.36 0.70 - 3.10 10*3/mm3    Monocytes, Absolute 0.53 0.10 - 0.90 10*3/mm3    Eosinophils, Absolute 0.04 0.00 - 0.40 10*3/mm3    Basophils, Absolute 0.04 0.00 - 0.20 10*3/mm3    Immature Grans, Absolute 0.05 0.00 - 0.05 10*3/mm3    nRBC 0.0 0.0 -  0.2 /100 WBC       Radiology  No Radiology Exams Resulted Within Past 24 Hours    Medications given in the ED:  Medications   lidocaine (LIDODERM) 5 % 1 patch (1 patch Transdermal Medication Applied 5/5/23 3925)   methocarbamol (ROBAXIN) tablet 750 mg (750 mg Oral Given 5/5/23 0904)   ketorolac (TORADOL) injection 30 mg (30 mg Intramuscular Given 5/5/23 5738)       Orders placed during this visit:  Orders Placed This Encounter   Procedures    CT Lumbar Spine Without Contrast    MRI Lumbar Spine With & Without Contrast    Comprehensive Metabolic Panel    CBC Auto Differential    CBC & Differential       Medical Decision Making:  ED Course as of 05/06/23 1040   Fri May 05, 2023   6334 I discussed the case with PATRICK Nogueira with NS at this time regarding the patient.  I discussed work-up, results, concerns.  I discussed the consulting provider's desire for TLSO brace and follow-up as an outpatient with neurosurgery.   [DC]      ED Course User Index  [DC] Félix Tellez, PA             PPE: Both the patient and I wore a surgical mask throughout the entire patient encounter.     Diagnosis  Final diagnoses:   Closed compression fracture of body of L1 vertebra   Renal insufficiency   Abnormal MRI        Vincent Yi II, MD  05/06/23 1040     hard copy, drawn during this pregnancy

## 2023-05-06 DIAGNOSIS — S32.010A COMPRESSION FRACTURE OF L1 VERTEBRA, INITIAL ENCOUNTER: Primary | ICD-10-CM

## 2023-05-06 RX ORDER — ROSUVASTATIN CALCIUM 40 MG/1
40 TABLET, COATED ORAL DAILY
Qty: 90 TABLET | Refills: 3 | Status: SHIPPED | OUTPATIENT
Start: 2023-05-06

## 2023-05-08 ENCOUNTER — TELEPHONE (OUTPATIENT)
Dept: FAMILY MEDICINE CLINIC | Facility: CLINIC | Age: 73
End: 2023-05-08
Payer: COMMERCIAL

## 2023-05-08 ENCOUNTER — TELEPHONE (OUTPATIENT)
Dept: NEUROSURGERY | Facility: CLINIC | Age: 73
End: 2023-05-08
Payer: COMMERCIAL

## 2023-05-08 DIAGNOSIS — S32.000A COMPRESSION FRACTURE OF LUMBAR VERTEBRA, UNSPECIFIED LUMBAR VERTEBRAL LEVEL, INITIAL ENCOUNTER: Primary | ICD-10-CM

## 2023-05-08 NOTE — TELEPHONE ENCOUNTER
I called patient left vm. Xray order in chart follow up appointment made in 1 month with Dr. Hickman.

## 2023-05-08 NOTE — TELEPHONE ENCOUNTER
Caller: Bob Monahan    Relationship to patient: Self    Best call back number: [phone]    Chief complaint: ER FOLLOW UP    Type of visit: HOSPITAL FOLLOW UP     Requested date: ANY DAY WITH THE LATEST APPOINTMENT     If rescheduling, when is the original appointment: N/A    Additional notes:NOTHING WITH DEE BRENNAN UNTIL 5/25/23

## 2023-05-08 NOTE — TELEPHONE ENCOUNTER
----- Message from PATRICK Colindres sent at 5/5/2023  4:45 PM EDT -----  Regarding: Hospital f/u  Patient seen for L1 compression fracture, will need follow-up in the office in 1 month with lumbar AP and lateral x-rays

## 2023-05-08 NOTE — TELEPHONE ENCOUNTER
Patient called stated someone called informing him of an appointment.  I informed him he has an appointment 6/14/23 at 9:30 am [address] [facility] [address].  Patient understood

## 2023-05-11 ENCOUNTER — HOSPITAL ENCOUNTER (EMERGENCY)
Facility: HOSPITAL | Age: 73
Discharge: HOME | End: 2023-05-11
Attending: EMERGENCY MEDICINE
Payer: MEDICARE

## 2023-05-11 VITALS
BODY MASS INDEX: 24.99 KG/M2 | RESPIRATION RATE: 18 BRPM | SYSTOLIC BLOOD PRESSURE: 174 MMHG | OXYGEN SATURATION: 97 % | HEIGHT: 65 IN | DIASTOLIC BLOOD PRESSURE: 117 MMHG | TEMPERATURE: 97 F | HEART RATE: 89 BPM | WEIGHT: 150 LBS

## 2023-05-11 DIAGNOSIS — S32.010A CLOSED COMPRESSION FRACTURE OF BODY OF L1 VERTEBRA: Primary | ICD-10-CM

## 2023-05-11 PROCEDURE — 99282 EMERGENCY DEPT VISIT SF MDM: CPT

## 2023-05-11 RX ORDER — LIDOCAINE 50 MG/G
1 PATCH TOPICAL EVERY 24 HOURS
Qty: 30 PATCH | Refills: 0 | Status: SHIPPED | OUTPATIENT
Start: 2023-05-11 | End: 2023-05-17 | Stop reason: SDUPTHER

## 2023-05-11 RX ORDER — METHOCARBAMOL 750 MG/1
750 TABLET, FILM COATED ORAL 3 TIMES DAILY PRN
Qty: 24 TABLET | Refills: 0 | Status: SHIPPED | OUTPATIENT
Start: 2023-05-11 | End: 2023-05-19

## 2023-05-11 RX ORDER — NAPROXEN 500 MG/1
500 TABLET ORAL 2 TIMES DAILY PRN
Qty: 15 TABLET | Refills: 0 | Status: SHIPPED | OUTPATIENT
Start: 2023-05-11 | End: 2023-05-17 | Stop reason: SDUPTHER
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
## 2023-05-11 NOTE — ED NOTES
Patient from home via private vehicle; patient here for medication refill. Was seen on the 5th of may and prescribed medications for his back, does not see neurosurgeon until June and cannot get his medications refilled until then.

## 2023-05-17 ENCOUNTER — OFFICE VISIT (OUTPATIENT)
Dept: FAMILY MEDICINE CLINIC | Facility: CLINIC | Age: 73
End: 2023-05-17
Payer: MEDICARE

## 2023-05-17 ENCOUNTER — TELEPHONE (OUTPATIENT)
Dept: FAMILY MEDICINE CLINIC | Facility: CLINIC | Age: 73
End: 2023-05-17
Payer: COMMERCIAL

## 2023-05-17 VITALS
SYSTOLIC BLOOD PRESSURE: 143 MMHG | BODY MASS INDEX: 24.99 KG/M2 | HEIGHT: 65 IN | HEART RATE: 83 BPM | WEIGHT: 150 LBS | RESPIRATION RATE: 16 BRPM | DIASTOLIC BLOOD PRESSURE: 81 MMHG | TEMPERATURE: 97.1 F | OXYGEN SATURATION: 97 %

## 2023-05-17 DIAGNOSIS — Z09 HOSPITAL DISCHARGE FOLLOW-UP: Primary | ICD-10-CM

## 2023-05-17 DIAGNOSIS — S32.010A COMPRESSION FRACTURE OF L1 VERTEBRA, INITIAL ENCOUNTER: ICD-10-CM

## 2023-05-17 DIAGNOSIS — I10 BENIGN ESSENTIAL HTN: ICD-10-CM

## 2023-05-17 RX ORDER — NAPROXEN 500 MG/1
500 TABLET ORAL 2 TIMES DAILY PRN
Qty: 60 TABLET | Refills: 2 | Status: SHIPPED | OUTPATIENT
Start: 2023-05-17 | End: 2023-06-16

## 2023-05-17 RX ORDER — LIDOCAINE 50 MG/G
1 PATCH TOPICAL EVERY 24 HOURS
Qty: 30 PATCH | Refills: 2 | Status: SHIPPED | OUTPATIENT
Start: 2023-05-17 | End: 2023-05-22 | Stop reason: SDUPTHER

## 2023-05-17 RX ORDER — AMLODIPINE BESYLATE 10 MG/1
TABLET ORAL
COMMUNITY
Start: 2023-05-12

## 2023-05-17 RX ORDER — METHOCARBAMOL 750 MG/1
750 TABLET, FILM COATED ORAL 3 TIMES DAILY
Qty: 42 TABLET | Refills: 0 | Status: SHIPPED | OUTPATIENT
Start: 2023-05-17 | End: 2023-05-31

## 2023-05-17 NOTE — TELEPHONE ENCOUNTER
Caller: Bob Monahan    Relationship to patient: Self    Best call back number: [phone]    Patient is needing: PATIENT ADVISED THAT BIN IS GOING TO BE FAXING OVER SOME PAPERWORK TO BE FILLED OUT ON THE MEDICATION THAT WAS PRESCRIBED FOR HIS L1 DISK FRACTURE.  HE NEEDS THIS FILLED OUT ASAP AS THE INSURANCE WON'T COVER IT UNLESS IT IS COMPLETED AND RETURNED SHOWING IT IS NECESSARY

## 2023-05-17 NOTE — PROGRESS NOTES
Chief Complaint  Back Pain    Subjective          Bob presents to [facility] as a 73 year old male to follow up on back pain after being seen in the ED on 05/11/2023     ED 5/11/2023  HPI:  Context: Bob Monahan is a 73 y.o. male who presents to the ED c/o chief complaint of request for medication refill.  Patient reports that he was recently seen here for lumbar compression fracture.  Patient has been wearing his brace, currently has scheduled follow-up with a neurosurgeon but follows not till June.  Patient is requesting refill of his medications.  Patient denies any new fall or trauma, no worsening of symptoms, no weakness or numbness.  Patient denies any other complaints.  Patient reports that he attempted to follow-up with his primary care but they did not have a appointment available and they would not refill his medications without an appointment.    He has an upcoming appointment with a neurosurgeon on 06/14/2023.  He is wearing his back brace and taking the prescribed medication as directed  He takes naproxen and muscle relaxer daily and lidocaine patch  He feels that this is controlling his symptoms at this time    He denies any fever, chills, pain going down your legs,  loss of bladder or bowel function, weakness or numbness in arms or legs, nausea, vomiting, abdominal pain, or syncope.      He has no other acute C/o today    The following portions of the patient's history were reviewed and updated as appropriate: allergies, current medications, past family history, past medical history, past social history, past surgical history, and problem list              Review of Systems   Constitutional: Negative for chills, fatigue and fever.   HENT: Negative for congestion.    Eyes: Negative for visual disturbance.   Respiratory: Negative for cough, shortness of breath and wheezing.    Cardiovascular: Negative for chest pain, palpitations and leg swelling.  "  Gastrointestinal: Negative for abdominal pain, diarrhea, nausea and vomiting.   Musculoskeletal: Positive for back pain. Negative for gait problem.   Neurological: Negative for dizziness and light-headedness.        Objective   Vital Signs:   Vitals:    05/17/23 1426   BP: 143/81   Pulse: 83   Resp: 16   Temp: 97.1 °F (36.2 °C)   TempSrc: Skin   SpO2: 97%   Weight: 68 kg (150 lb)   Height: 165.1 cm (65\")        BMI is within normal parameters. No other follow-up for BMI required.        Physical Exam  Vitals reviewed.   Constitutional:       General: He is not in acute distress.  Eyes:      Conjunctiva/sclera: Conjunctivae normal.   Neck:      Thyroid: No thyromegaly.      Vascular: No carotid bruit.   Cardiovascular:      Rate and Rhythm: Normal rate and regular rhythm.      Heart sounds: Normal heart sounds.   Pulmonary:      Effort: Pulmonary effort is normal.      Breath sounds: Normal breath sounds.   Musculoskeletal:      Lumbar back: Spasms present. Decreased range of motion. Positive right straight leg raise test and positive left straight leg raise test.   Neurological:      Mental Status: He is alert.   Psychiatric:         Attention and Perception: Attention normal.         Mood and Affect: Mood normal.          Result Review :     The following data was reviewed by: PATRICK Fiore on 05/17/2023:        MRI Lumbar Spine With & Without Contrast (05/05/2023 12:35)  IMPRESSION:  1. Patient has an acute to subacute compression fracture involving the  superior body and endplate and inferior body and endplate of L1 where  there is 30% loss of anterior and central, less than 10% loss of  vertebral body height. There is a geographic 2.3 x 2.1 x 2 cm area of  absent edema and absent enhancement in the midline of the central to  anterior aspect of the compressed L1 vertebra right where the  compression fracture has occurred. This is consistent with devitalized  bone and likely represents infarcted bone. " Whether the infarct at this  location within the L1 vertebra predated the compression fracture and  resulted in demineralized bone and resulted in a secondary pathologic  compression fracture at L1 or whether the infarct is secondary to the  compression fracture is difficult to establish on this exam.  2. There is lumbar spondylosis as described with a 2 mm retrolisthesis  of L1 with respect to L2 and minimal posterior disc bulge with no canal  narrowing, and only minimal foraminal narrowing at L1-L2. There is a 2-3  mm retrolisthesis of L2 with respect to L3 and mild diffuse posterior  disc bulge with mild narrowing of the thecal sac. There is eccentric  bulging or protruding disc material into the inferior left foramen and  to the far left laterally that mild to moderately narrows the left  foramen, abuts the anterior aspect of the left L2 nerve root within the  left foramen and lateral to the foramen to the far left laterally at  L2-L3.  3. At L3-L4, there is mild-to-moderate bilateral facet overgrowth and  mild diffuse annular disc bulge, mild narrowing of the thecal sac and  mild right foraminal narrowing.  4. At L4-L5, there is mild-to-moderate right and moderate left facet  overgrowth, tiny 4 x 2 mm crescentic synovial cyst embedded with the  thickening ligamentum flavum along the medial left facets. There is mild  diffuse posterior disc bulge and there is at least mild up to  mild-to-moderate narrowing of the thecal sac, slight narrowing of the  left lateral recess, could potentially affect the traversing left L5  nerve root and there is mild left and mild-to-moderate right foraminal  narrowing.  5. There is slight ectasia of the distal abdominal aorta just above the  bifurcation measuring 2.8 x 2.6 cm.   CT Lumbar Spine Without Contrast (05/05/2023 08:33)    IMPRESSION:  1. Since prior chest CT on 10/27/2022, there has been interval  development of a compression fracture at the L1 lumbar level  with  concavity of the superior body and endplate of L1, a coronal lucent  cleft extending from the junction of the anterior and middle thirds of  the superior endplate of L1 channeling into the anterior body of L1.  There is 20-30% loss of anterior and there is 40% loss of central, no  loss of posterior vertebral body height at L1. There is soft tissue  thickening in the paravertebral fat, likely paravertebral edema related  to acute to subacute nature of this compression fracture. I strongly  recommend a contrast-enhanced MRI to further evaluate.  2. Lumbar spondylosis is present. There is 3 mm retrolisthesis of L1  with respect to L2 and diffuse posterior disc bulge, and there is mild  canal and bilateral foraminal narrowing at L1-L2.   3. At L2-L3, there is a 3 mm retrolisthesis of L2 with respect to L3,  and moderate diffuse annular disc bulge, prominent posterior epidural  fat contributes to at least mild-to-moderate generalized narrowing of  the thecal sac, bulging disc material into the foramina results in mild  right and there is moderate left foraminal narrowing, bulging disc  material abutting the anteromedial aspect of the exiting left L2 nerve  root within the foramen and lateral to the left foramen.  4. At L3-L4, there is mild bilateral facet overgrowth, diffuse posterior  disc bulge, prominent posterior epidural fat contributes to up to  mild-to-moderate generalized narrowing of the thecal sac, mild right  foraminal narrowing.  5. At L4-L5, there is mild-to-moderate bilateral facet overgrowth and  ligamentum flavum thickening, indents the right and left posterior  lateral aspect of the thecal sac and diffuse posterior disc bulge and  there is moderate narrowing of the thecal sac, slight narrowing of the  lateral recesses, left greater than right, could affect the traversing  L5 nerve roots. There is mild left and mild-to-moderate right foraminal  narrowing.  6. There are extensive calcified plaques in  the aortoiliac vessels,  calcified plaques narrow the common iliac arteries bilaterally, left  greater than right. Findings and recommendation for an MRI of the lumbar  spine with and without contrast were communicated to Félix Tellez, the  physician assistant in the ER taking care of the patient by telephone on  05/05/2023 at 8:45 AM.     Radiation dose reduction techniques were utilized, including automated  exposure control and exposure modulation based on body size.     This report was finalized on 5/5/2023 5:40 PM by Dr. Bob Mendez M.D.          Assessment and Plan    Diagnoses and all orders for this visit:    1. Hospital discharge follow-up (Primary)  Comments:  Seen in ED on 05/11/2023    2. Compression fracture of L1 vertebra, initial encounter  Comments:  keep follow up with neurosurgery as directed  continue current managment  avoid driving and drinking alcohol when taking muscle relaxer  Orders:  -     lidocaine (Lidoderm) 5 %; Place 1 patch on the skin as directed by provider Daily. Remove & Discard patch within 12 hours or as directed by MD  Dispense: 30 patch; Refill: 2  -     naproxen (NAPROSYN) 500 MG tablet; Take 1 tablet by mouth 2 (Two) Times a Day As Needed for Mild Pain for up to 30 days.  Dispense: 60 tablet; Refill: 2  -     methocarbamol (ROBAXIN) 750 MG tablet; Take 1 tablet by mouth 3 (Three) Times a Day for 14 days.  Dispense: 42 tablet; Refill: 0  Take all medications as prescribed.  The patient has taken Robaxin in the past and tolerated it well with no side effects.    The patient was instructed to not drink alcohol and to not drive while taking this medication.  -Increase daily water intake  -Return to the office for worsening signs or symptoms, fever, chills, pain going down your legs, or if symptoms do not improve after 2 weeks.    -Seek immediate medical attention for loss of bladder or bowel function, weakness or numbness in arms or legs, nausea, vomiting, abdominal pain, or  syncope.  -The patient verbalized understanding of all instructions given today.       3. Benign essential HTN  Comments:  keep scheduled follow up with PCP as directed  monitor b/p at home bring log to next visit        Follow Up   Return if symptoms worsen or fail to improve, for keep regular follow up with PCP as directed.  Patient was given instructions and counseling regarding his condition or for health maintenance advice. Please see specific information pulled into the AVS if appropriate.

## 2023-05-17 NOTE — TELEPHONE ENCOUNTER
I am not sure what medication he is referring to and I can only fill out paperwork on the medication I have him on.... If prescribed by an outside provider has to go to them

## 2023-05-18 NOTE — TELEPHONE ENCOUNTER
Called patient and he is inquiring about Lidocaine patches prescribed yesterday at his appt with PATRICK Whaley.  Needs Prior authorization.  Will check and see if we received fax that was sent yesterday and call patient back

## 2023-05-22 DIAGNOSIS — S32.010A COMPRESSION FRACTURE OF L1 VERTEBRA, INITIAL ENCOUNTER: ICD-10-CM

## 2023-05-22 RX ORDER — LIDOCAINE 50 MG/G
1 PATCH TOPICAL EVERY 24 HOURS
Qty: 30 PATCH | Refills: 2 | Status: SHIPPED | OUTPATIENT
Start: 2023-05-22

## 2023-05-22 NOTE — TELEPHONE ENCOUNTER
Caller: Bob Monahan    Relationship to patient: Self    Best call back number: [phone]    Patient is needing: PATIENT STATES THAT HIS PHARMACY IS SAYING THAT THE INSURANCE IS NEEDING ADDITIONAL INFORMATION AS TO WHY THE LIDOCAINE 5% PATCHES ARE BEING PRESCRIBED.     HE STATES THAT THE PHARMACY HAS ALREADY SENT THE PAPERWORK VIA FAX AND HE HAS BEEN WAITING SINCE 5/17.     THE REASON HE NEEDS THE PATCHES IS FOR A FRACTURED L.1 DISC.   PLEASE CALL AND ADVISE ASAP.

## 2023-05-26 RX ORDER — CLOPIDOGREL BISULFATE 75 MG/1
75 TABLET ORAL DAILY
Qty: 90 TABLET | Refills: 3 | Status: SHIPPED | OUTPATIENT
Start: 2023-05-26

## 2023-05-30 ENCOUNTER — TRANSCRIBE ORDERS (OUTPATIENT)
Dept: ADMINISTRATIVE | Facility: HOSPITAL | Age: 73
End: 2023-05-30

## 2023-05-30 ENCOUNTER — LAB (OUTPATIENT)
Dept: LAB | Facility: HOSPITAL | Age: 73
End: 2023-05-30

## 2023-05-30 DIAGNOSIS — N18.32 CHRONIC KIDNEY DISEASE (CKD) STAGE G3B/A1, MODERATELY DECREASED GLOMERULAR FILTRATION RATE (GFR) BETWEEN 30-44 ML/MIN/1.73 SQUARE METER AND ALBUMINURIA CREATININE RATIO LESS THAN 30 MG/G (CMS/H*: ICD-10-CM

## 2023-05-30 DIAGNOSIS — N18.32 CHRONIC KIDNEY DISEASE (CKD) STAGE G3B/A1, MODERATELY DECREASED GLOMERULAR FILTRATION RATE (GFR) BETWEEN 30-44 ML/MIN/1.73 SQUARE METER AND ALBUMINURIA CREATININE RATIO LESS THAN 30 MG/G (CMS/H*: Primary | ICD-10-CM

## 2023-05-30 DIAGNOSIS — E55.9 VITAMIN D DEFICIENCY DISEASE: ICD-10-CM

## 2023-05-30 LAB
25(OH)D3 SERPL-MCNC: 41.6 NG/ML (ref 30–100)
ALBUMIN SERPL-MCNC: 4.1 G/DL (ref 3.5–5.2)
ANION GAP SERPL CALCULATED.3IONS-SCNC: 11 MMOL/L (ref 5–15)
BACTERIA UR QL AUTO: NORMAL /HPF
BILIRUB UR QL STRIP: NEGATIVE
BUN SERPL-MCNC: 31 MG/DL (ref 8–23)
BUN/CREAT SERPL: 19.5 (ref 7–25)
CALCIUM SPEC-SCNC: 9.1 MG/DL (ref 8.6–10.5)
CHLORIDE SERPL-SCNC: 107 MMOL/L (ref 98–107)
CLARITY UR: CLEAR
CO2 SERPL-SCNC: 22 MMOL/L (ref 22–29)
COLOR UR: YELLOW
CREAT SERPL-MCNC: 1.59 MG/DL (ref 0.76–1.27)
CREAT UR-MCNC: 137.7 MG/DL
EGFRCR SERPLBLD CKD-EPI 2021: 45.8 ML/MIN/1.73
GLUCOSE SERPL-MCNC: 110 MG/DL (ref 65–99)
GLUCOSE UR STRIP-MCNC: NEGATIVE MG/DL
HGB UR QL STRIP.AUTO: NEGATIVE
HYALINE CASTS UR QL AUTO: NORMAL /LPF
KETONES UR QL STRIP: NEGATIVE
LEUKOCYTE ESTERASE UR QL STRIP.AUTO: NEGATIVE
NITRITE UR QL STRIP: NEGATIVE
PH UR STRIP.AUTO: <=5 [PH] (ref 5–8)
PHOSPHATE SERPL-MCNC: 3.6 MG/DL (ref 2.5–4.5)
POTASSIUM SERPL-SCNC: 4.3 MMOL/L (ref 3.5–5.2)
PROT ?TM UR-MCNC: 181.6 MG/DL
PROT UR QL STRIP: ABNORMAL
PROT/CREAT UR: 1318.8 MG/G CREA (ref 0–200)
RBC # UR STRIP: NORMAL /HPF
REF LAB TEST METHOD: NORMAL
SODIUM SERPL-SCNC: 140 MMOL/L (ref 136–145)
SP GR UR STRIP: >=1.03 (ref 1–1.03)
SQUAMOUS #/AREA URNS HPF: NORMAL /HPF
UROBILINOGEN UR QL STRIP: ABNORMAL
WBC # UR STRIP: NORMAL /HPF

## 2023-05-30 PROCEDURE — 80069 RENAL FUNCTION PANEL: CPT

## 2023-05-30 PROCEDURE — 82306 VITAMIN D 25 HYDROXY: CPT

## 2023-05-30 PROCEDURE — 84156 ASSAY OF PROTEIN URINE: CPT

## 2023-05-30 PROCEDURE — 82570 ASSAY OF URINE CREATININE: CPT

## 2023-05-30 PROCEDURE — 36415 COLL VENOUS BLD VENIPUNCTURE: CPT

## 2023-05-30 PROCEDURE — 81001 URINALYSIS AUTO W/SCOPE: CPT

## 2023-05-31 ENCOUNTER — HOSPITAL ENCOUNTER (OUTPATIENT)
Facility: HOSPITAL | Age: 73
Setting detail: HOSPITAL OUTPATIENT SURGERY
Discharge: HOME | End: 2023-05-31
Attending: INTERNAL MEDICINE | Admitting: INTERNAL MEDICINE
Payer: MEDICARE

## 2023-05-31 ENCOUNTER — ANESTHESIA (OUTPATIENT)
Dept: GASTROENTEROLOGY | Facility: HOSPITAL | Age: 73
End: 2023-05-31
Payer: MEDICARE

## 2023-05-31 ENCOUNTER — ANESTHESIA EVENT (OUTPATIENT)
Dept: GASTROENTEROLOGY | Facility: HOSPITAL | Age: 73
End: 2023-05-31
Payer: MEDICARE

## 2023-05-31 VITALS
DIASTOLIC BLOOD PRESSURE: 95 MMHG | BODY MASS INDEX: 24.49 KG/M2 | SYSTOLIC BLOOD PRESSURE: 139 MMHG | HEART RATE: 74 BPM | OXYGEN SATURATION: 98 % | RESPIRATION RATE: 20 BRPM | HEIGHT: 65 IN | WEIGHT: 147 LBS

## 2023-05-31 DIAGNOSIS — R91.8 LUNG MASS: ICD-10-CM

## 2023-05-31 PROCEDURE — 25010000002 PHENYLEPHRINE 10 MG/ML SOLUTION: Performed by: ANESTHESIOLOGY

## 2023-05-31 PROCEDURE — 25010000002 PROPOFOL 10 MG/ML EMULSION: Performed by: ANESTHESIOLOGY

## 2023-05-31 PROCEDURE — 88305 TISSUE EXAM BY PATHOLOGIST: CPT | Performed by: INTERNAL MEDICINE

## 2023-05-31 PROCEDURE — 88173 CYTOPATH EVAL FNA REPORT: CPT | Performed by: INTERNAL MEDICINE

## 2023-05-31 PROCEDURE — C1726 CATH, BAL DIL, NON-VASCULAR: HCPCS | Performed by: INTERNAL MEDICINE

## 2023-05-31 PROCEDURE — 25010000002 EPINEPHRINE PER 0.1 MG: Performed by: INTERNAL MEDICINE

## 2023-05-31 RX ORDER — SODIUM CHLORIDE 0.9 % (FLUSH) 0.9 %
10 SYRINGE (ML) INJECTION EVERY 12 HOURS SCHEDULED
Status: DISCONTINUED | OUTPATIENT
Start: 2023-05-31 | End: 2023-05-31 | Stop reason: HOSPADM

## 2023-05-31 RX ORDER — LIDOCAINE HYDROCHLORIDE 20 MG/ML
INJECTION, SOLUTION INFILTRATION; PERINEURAL AS NEEDED
Status: DISCONTINUED | OUTPATIENT
Start: 2023-05-31 | End: 2023-05-31 | Stop reason: SURG

## 2023-05-31 RX ORDER — ONDANSETRON 2 MG/ML
4 INJECTION INTRAMUSCULAR; INTRAVENOUS ONCE AS NEEDED
Status: DISCONTINUED | OUTPATIENT
Start: 2023-05-31 | End: 2023-05-31 | Stop reason: HOSPADM

## 2023-05-31 RX ORDER — LIDOCAINE HYDROCHLORIDE 10 MG/ML
INJECTION, SOLUTION EPIDURAL; INFILTRATION; INTRACAUDAL; PERINEURAL AS NEEDED
Status: DISCONTINUED | OUTPATIENT
Start: 2023-05-31 | End: 2023-05-31 | Stop reason: HOSPADM

## 2023-05-31 RX ORDER — LIDOCAINE HYDROCHLORIDE 20 MG/ML
INJECTION, SOLUTION EPIDURAL; INFILTRATION; INTRACAUDAL; PERINEURAL AS NEEDED
Status: DISCONTINUED | OUTPATIENT
Start: 2023-05-31 | End: 2023-05-31 | Stop reason: HOSPADM

## 2023-05-31 RX ORDER — SODIUM CHLORIDE, SODIUM LACTATE, POTASSIUM CHLORIDE, CALCIUM CHLORIDE 600; 310; 30; 20 MG/100ML; MG/100ML; MG/100ML; MG/100ML
30 INJECTION, SOLUTION INTRAVENOUS CONTINUOUS PRN
Status: DISCONTINUED | OUTPATIENT
Start: 2023-05-31 | End: 2023-05-31 | Stop reason: HOSPADM

## 2023-05-31 RX ORDER — PHENYLEPHRINE HYDROCHLORIDE 10 MG/ML
INJECTION INTRAVENOUS AS NEEDED
Status: DISCONTINUED | OUTPATIENT
Start: 2023-05-31 | End: 2023-05-31 | Stop reason: SURG

## 2023-05-31 RX ORDER — PROPOFOL 10 MG/ML
VIAL (ML) INTRAVENOUS AS NEEDED
Status: DISCONTINUED | OUTPATIENT
Start: 2023-05-31 | End: 2023-05-31 | Stop reason: SURG

## 2023-05-31 RX ORDER — SODIUM CHLORIDE 0.9 % (FLUSH) 0.9 %
10 SYRINGE (ML) INJECTION AS NEEDED
Status: DISCONTINUED | OUTPATIENT
Start: 2023-05-31 | End: 2023-05-31 | Stop reason: HOSPADM

## 2023-05-31 RX ADMIN — PHENYLEPHRINE HYDROCHLORIDE 100 MCG: 10 INJECTION INTRAVENOUS at 13:05

## 2023-05-31 RX ADMIN — PROPOFOL 150 MG: 10 INJECTION, EMULSION INTRAVENOUS at 12:24

## 2023-05-31 RX ADMIN — SODIUM CHLORIDE, POTASSIUM CHLORIDE, SODIUM LACTATE AND CALCIUM CHLORIDE 30 ML/HR: 600; 310; 30; 20 INJECTION, SOLUTION INTRAVENOUS at 10:06

## 2023-05-31 RX ADMIN — LIDOCAINE HYDROCHLORIDE 50 MG: 20 INJECTION, SOLUTION INFILTRATION; PERINEURAL at 12:23

## 2023-05-31 RX ADMIN — PHENYLEPHRINE HYDROCHLORIDE 100 MCG: 10 INJECTION INTRAVENOUS at 12:52

## 2023-05-31 RX ADMIN — PHENYLEPHRINE HYDROCHLORIDE 100 MCG: 10 INJECTION INTRAVENOUS at 12:56

## 2023-05-31 RX ADMIN — PROPOFOL 30 MG: 10 INJECTION, EMULSION INTRAVENOUS at 12:28

## 2023-05-31 RX ADMIN — PHENYLEPHRINE HYDROCHLORIDE 100 MCG: 10 INJECTION INTRAVENOUS at 13:11

## 2023-05-31 RX ADMIN — PROPOFOL 300 MCG/KG/MIN: 10 INJECTION, EMULSION INTRAVENOUS at 12:27

## 2023-05-31 NOTE — OP NOTE
Bronchoscopy Procedure Note    Procedure:  1. Bronchoscopy, Diagnostic  2. EBUS FNA left perihilar area    Pre-Operative Diagnosis: Left perihilar mass    Post-Operative Diagnosis: Lymphadenopathy left perihilar area noted    Indication:    Anesthesia: Monitored Anesthesia Care (MAC)    Procedure Details: Patient was consented for the procedure with all risks and benefits of the procedure explained in detail.  Patient was given the opportunity to ask questions and all concerns were answered.  The bronchocope was inserted into the main airway via the L MA. An anatomical survey was done of the main airways and the subsegmental bronchus.  The findings are reported above.  Diagnostic bronchoscopy did not show any endobronchial lesion  We then proceeded with EBUS scope.  At left perihilar area lymph node versus mass was identified and using real-time ultrasound FNA performed x2.  Adequate sampling confirmed by rapid onsite cytology.  Patient taught the procedure well.  There was minimal amount of bleeding and blood clot which was then removed from the airway.  Patient tolerated the procedure well.    Findings:    Estimated Blood Loss:  less than 50 mL           Specimens:  Sent FNA samples                Complications:  None; patient tolerated the procedure well.           Disposition: PACU - hemodynamically stable.      Patient tolerated the procedure well.    While I was in the room and during my examination of the patient I wore gown, gloves, mask, eye protection and washed my hands before and after the encounter.  Proper enhanced droplet precautions and isolation precautions were taken.    Nikunj Tidwell MD  5/31/2023  13:54 EDT

## 2023-05-31 NOTE — ANESTHESIA PREPROCEDURE EVALUATION
Anesthesia Evaluation     Patient summary reviewed and Nursing notes reviewed                Airway   Mallampati: II  Dental    (+) upper dentures    Pulmonary - normal exam   (+) a smoker Former,   Cardiovascular     ECG reviewed  PT is on anticoagulation therapy  Patient on routine beta blocker    (+) hypertension, valvular problems/murmurs, past MI , CAD, CABG, cardiac stents murmur, hyperlipidemia,  carotid artery disease    ROS comment: Reviewed latest cardiology visit note:    Summary: The ejection fraction biplane was calculated at 69%.  Globally normal left ventricular systolic function.  Impaired relaxation compatible with diastolic dysfunction.  Left ventricular size is normal.  Mild left ventricular hypertrophy.  Mild mitral regurgitation is present.  Trace tricuspid regurgitation.  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       Neuro/Psych  (+) psychiatric history Anxiety and Depression,    GI/Hepatic/Renal/Endo    (+)   renal disease CRI,     Musculoskeletal     Abdominal    Substance History      OB/GYN          Other                      Anesthesia Plan    ASA 3     general       Anesthetic plan, risks, benefits, and alternatives have been provided, discussed and informed consent has been obtained with: patient.        CODE STATUS:

## 2023-05-31 NOTE — ANESTHESIA POSTPROCEDURE EVALUATION
"Patient: Bob Monahan    Procedure Summary     Date: 05/31/23 Room / Location:  [facility]    Anesthesia Start: 1209 Anesthesia Stop: 1343    Procedure: BRONCHOSCOPY WITH ENDOBRONCHIAL ULTRASOUND WITH FNA (Bronchus) Diagnosis:     Surgeons: Nikunj Tidwell MD Provider: Radha Flaherty MD    Anesthesia Type: general ASA Status: 3          Anesthesia Type: general    Vitals  Vitals Value Taken Time   /95 05/31/23 1404   Temp     Pulse 74 05/31/23 1404   Resp 20 05/31/23 1404   SpO2 98 % 05/31/23 1404           Post Anesthesia Care and Evaluation    Patient location during evaluation: bedside  Patient participation: complete - patient participated  Level of consciousness: awake and alert  Pain management: adequate    Airway patency: patent  Anesthetic complications: No anesthetic complications    Cardiovascular status: acceptable  Respiratory status: acceptable  Hydration status: acceptable    Comments: /95   Pulse 74   Resp 20   Ht 165.1 cm (65\")   Wt 66.7 kg (147 lb)   SpO2 98%   BMI 24.46 kg/m²       "

## 2023-05-31 NOTE — ANESTHESIA PROCEDURE NOTES
Airway  Urgency: elective    Date/Time: 5/31/2023 12:26 PM  Airway not difficult    General Information and Staff    Patient location during procedure: OR  Anesthesiologist: Radha Flaherty MD    Indications and Patient Condition  Indications for airway management: airway protection    Preoxygenated: yes  MILS maintained throughout  Mask difficulty assessment: 0 - not attempted    Final Airway Details  Final airway type: supraglottic airway      Successful airway: classic and bronch  Size 5     Number of attempts at approach: 1  Assessment: lips, teeth, and gum same as pre-op and atraumatic intubation

## 2023-06-01 ENCOUNTER — PRIOR AUTHORIZATION (OUTPATIENT)
Dept: FAMILY MEDICINE CLINIC | Facility: CLINIC | Age: 73
End: 2023-06-01

## 2023-06-01 LAB
CYTO UR: NORMAL
LAB AP CASE REPORT: NORMAL
LAB AP NON-GYN SPECIMEN ADEQUACY: NORMAL
PATH REPORT.FINAL DX SPEC: NORMAL
PATH REPORT.GROSS SPEC: NORMAL

## 2023-06-06 ENCOUNTER — OFFICE VISIT (OUTPATIENT)
Dept: FAMILY MEDICINE CLINIC | Facility: CLINIC | Age: 73
End: 2023-06-06
Payer: MEDICARE

## 2023-06-06 VITALS
RESPIRATION RATE: 16 BRPM | OXYGEN SATURATION: 98 % | HEART RATE: 65 BPM | DIASTOLIC BLOOD PRESSURE: 62 MMHG | WEIGHT: 147 LBS | HEIGHT: 65 IN | BODY MASS INDEX: 24.49 KG/M2 | TEMPERATURE: 98.9 F | SYSTOLIC BLOOD PRESSURE: 121 MMHG

## 2023-06-06 DIAGNOSIS — S32.010A COMPRESSION FRACTURE OF L1 VERTEBRA, INITIAL ENCOUNTER: ICD-10-CM

## 2023-06-06 RX ORDER — LIDOCAINE 50 MG/G
1 PATCH TOPICAL EVERY 24 HOURS
Qty: 30 PATCH | Refills: 2 | Status: CANCELLED | OUTPATIENT
Start: 2023-06-06

## 2023-06-06 RX ORDER — METHOCARBAMOL 750 MG/1
750 TABLET, FILM COATED ORAL 3 TIMES DAILY PRN
Qty: 30 TABLET | Refills: 0 | Status: SHIPPED | OUTPATIENT
Start: 2023-06-06

## 2023-06-06 NOTE — PROGRESS NOTES
Chief Complaint  Follow-up and Back Pain    Subjective          History of Present Illness    Bob Monahan 73 y.o. male presents for a follow up on compression fracture of L1 vertebrae.  The patient was last seen in this office on 5/17/2023 for hospital discharge follow-up appointment.  He was prescribed lidocaine 5% patches and a 14-day supply of Methocarbamol (Robaxin) 750 mg muscle relaxers to take as needed.  The patient reports the muscle relaxers help the most, and he would like to have a refill today.  He is currently wearing a back brace for support.    He has an upcoming appointment with Dr. Eduar Hickman, Neurosurgery on 06/14/2023.  The patient was unable to receive the lidocaine patches that were ordered due to a denial of a prior authorization.  He has been using over-the-counter lidocaine patches.    He denies fever, chills, sciatica, loss of bladder or bowel function, weakness or numbness in arms or legs, nausea, vomiting, abdominal pain, and syncope.       Review of Systems   Constitutional:  Negative for appetite change, chills, fatigue and fever.   HENT:  Negative for congestion, sinus pressure and trouble swallowing.    Eyes:  Negative for visual disturbance.   Respiratory:  Negative for cough, chest tightness, shortness of breath and wheezing.    Cardiovascular:  Negative for chest pain, palpitations and leg swelling.   Gastrointestinal:  Negative for abdominal pain, constipation, diarrhea, nausea and vomiting.   Genitourinary:  Negative for decreased urine volume, difficulty urinating, dysuria, frequency and urgency.   Musculoskeletal:  Positive for back pain. Negative for gait problem, myalgias and neck pain.   Skin:  Negative for rash.   Neurological:  Negative for dizziness, syncope, weakness, light-headedness and numbness.   Psychiatric/Behavioral:  Negative for dysphoric mood. The patient is not nervous/anxious.       Objective   Vital Signs:   /62 (BP Location: Left arm,  "Patient Position: Sitting, Cuff Size: Adult)   Pulse 65   Temp 98.9 °F (37.2 °C) (Oral)   Resp 16   Ht 165.1 cm (65\")   Wt 66.7 kg (147 lb)   SpO2 98%   BMI 24.46 kg/m²      BMI is within normal parameters. No other follow-up for BMI required.        Physical Exam  Vitals and nursing note reviewed.   Constitutional:       General: He is not in acute distress.     Appearance: He is well-developed. He is not diaphoretic.   HENT:      Head: Normocephalic and atraumatic.   Eyes:      General: No scleral icterus.     Conjunctiva/sclera: Conjunctivae normal.      Pupils: Pupils are equal, round, and reactive to light.   Cardiovascular:      Rate and Rhythm: Normal rate and regular rhythm.      Pulses: Normal pulses.      Heart sounds: Normal heart sounds. No murmur heard.    No gallop.   Pulmonary:      Effort: Pulmonary effort is normal. No respiratory distress.      Breath sounds: Normal breath sounds. No wheezing or rales.   Musculoskeletal:         General: Tenderness present. No swelling or deformity.      Cervical back: Normal range of motion and neck supple.      Lumbar back: Tenderness present. No swelling, edema or spasms. Decreased range of motion.   Skin:     General: Skin is warm and dry.      Findings: No rash.   Neurological:      Mental Status: He is alert and oriented to person, place, and time.      Sensory: Sensation is intact. No sensory deficit.      Motor: Motor function is intact. No tremor, atrophy or abnormal muscle tone.      Coordination: Coordination is intact. Coordination normal.      Gait: Gait is intact. Gait normal.      Deep Tendon Reflexes: Reflexes are normal and symmetric. Reflexes normal.      Reflex Scores:       Patellar reflexes are 2+ on the right side and 2+ on the left side.       Achilles reflexes are 2+ on the right side and 2+ on the left side.  Psychiatric:         Mood and Affect: Mood normal.        The following data was reviewed by: PATRICK Almeida on " 06/06/2023:  Office Visit with Radha Kolb APRN (05/17/2023)            Assessment and Plan      Diagnoses and all orders for this visit:    1. Compression fracture of L1 vertebra, initial encounter  Comments:  Keep appt. with Neurosurgery next week  Continue current managment  Avoid driving and drinking alcohol when taking muscle relaxer  Follow up with PCP  Orders:  -     methocarbamol (ROBAXIN) 750 MG tablet; Take 1 tablet by mouth 3 (Three) Times a Day As Needed for Muscle Spasms.  Dispense: 30 tablet; Refill: 0            Follow Up     Return if symptoms worsen or fail to improve.    Patient was given instructions and counseling regarding his condition or for health maintenance advice. Please see specific information pulled into the AVS if appropriate.     -The patient was encouraged to follow up with Neurosurgery and PCP.

## 2023-06-13 ENCOUNTER — TELEPHONE (OUTPATIENT)
Dept: NEUROSURGERY | Facility: CLINIC | Age: 73
End: 2023-06-13
Payer: COMMERCIAL

## 2023-06-13 NOTE — TELEPHONE ENCOUNTER
PT CALLED BACK. HUB ADVISE OF XRAY NEEDED. PT IS TO GO AT 8AM PRIOR TO APPT TO HOSPITAL TO GET XR COMPLETED

## 2023-06-14 ENCOUNTER — HOSPITAL ENCOUNTER (OUTPATIENT)
Dept: GENERAL RADIOLOGY | Facility: HOSPITAL | Age: 73
Discharge: HOME | End: 2023-06-14
Admitting: NEUROLOGICAL SURGERY
Payer: MEDICARE

## 2023-06-14 DIAGNOSIS — S32.000A COMPRESSION FRACTURE OF LUMBAR VERTEBRA, UNSPECIFIED LUMBAR VERTEBRAL LEVEL, INITIAL ENCOUNTER: ICD-10-CM

## 2023-06-14 PROCEDURE — 72100 X-RAY EXAM L-S SPINE 2/3 VWS: CPT

## 2023-06-19 ENCOUNTER — TELEPHONE (OUTPATIENT)
Dept: NEUROSURGERY | Facility: CLINIC | Age: 73
End: 2023-06-19
Payer: COMMERCIAL

## 2023-06-19 NOTE — TELEPHONE ENCOUNTER
This pt called  regarding pain medication and would like for someone to call him pain ASAP.      Please advise

## 2023-06-19 NOTE — TELEPHONE ENCOUNTER
Patient informed. He said he will try to make it to the appointment to discuss with Dr. Hickman this week.

## 2023-06-19 NOTE — TELEPHONE ENCOUNTER
Patient seen Mirlande 5/5/23 in the ER. He was scheduled to see Dr. Hickman to discuss Kyphoplasty last week but appointment was cancelled due to him being called into an emergent surgery. Patient is scheduled to see him this Wekristi 6/21/23 with new XR.     He complains of severe back and left hip pain which has worsened since the weekend. He said he does not know how he will be getting to the appointment with Dr. Hickman because the pain is so bad and he is unable to stand up for any length of time to even put the brace on. I let him know if the pain is that bad then he will need to go to the ER. He said he would like Dr. Hickman to do his surgery if he needs surgery.     He is taking Methocarbamol 750 mg and Naproxen prn for pain. He said the Lidocaine patches was denied by his insurance ordered by his PCP.     Any other advice to give patient?

## 2023-06-19 NOTE — TELEPHONE ENCOUNTER
Caller: Bob Monahan    Relationship: Self    Best call back number: [phone]    What medication are you requesting: PAIN MEDICATION    What are your current symptoms: PATIENT STATED HE FRACTURED L-1 DISC, AND IS IN A LOT OF PAIN    If a prescription is needed, what is your preferred pharmacy and phone number: [id number] [address] AT [facility] - [phone] [facility] [phone]      Additional notes: PATIENT STATED THAT HE DOES NOT SEE THE NEUROSURGEON UNTIL 06/21/23, AND WOULD LIKE TO KNOW IF CLAU BRENNAN CAN SEND IN A MEDICATION TO HELP HIS PAIN SO THAT HE CAN MAKE IT TO THAT APPOINTMENT.    HE WOULD LIKE A CALL FROM CLAU BRENNAN REGARDING THIS.    PLEASE CALL.

## 2023-06-19 NOTE — TELEPHONE ENCOUNTER
Patient called back for the 2nd time. Patient states he is still in excruciating pain. Sent message to MA to advise patient.

## 2023-06-19 NOTE — TELEPHONE ENCOUNTER
I cannot prescribe pain medication...... if he wants to try tramadol he has to make an appointment and fill out 3 documents, controlled substance contract

## 2023-06-21 NOTE — TELEPHONE ENCOUNTER
Patient called asking if Dr. Hickman sent in prescription. Patient unclear of what he was going to send in to pharmacy.

## 2023-07-05 PROBLEM — Z92.241 HISTORY OF RECENT STEROID USE: Status: ACTIVE | Noted: 2023-01-01

## 2023-07-05 PROBLEM — Z98.890 STATUS POST KYPHOPLASTY: Status: ACTIVE | Noted: 2023-01-01

## 2023-07-05 PROBLEM — Z87.898 HISTORY OF DYSPHAGIA: Chronic | Status: ACTIVE | Noted: 2023-01-01

## 2023-07-05 PROBLEM — D72.829 LEUKOCYTOSIS: Status: ACTIVE | Noted: 2023-01-01

## 2023-07-05 PROBLEM — K59.00 CONSTIPATED: Status: ACTIVE | Noted: 2023-01-01

## 2023-07-05 PROBLEM — G89.18 POSTOPERATIVE BACK PAIN: Status: ACTIVE | Noted: 2023-01-01

## 2023-07-05 PROBLEM — M54.9 POSTOPERATIVE BACK PAIN: Status: ACTIVE | Noted: 2023-01-01

## 2023-07-05 PROBLEM — R00.0 TACHYCARDIA: Status: ACTIVE | Noted: 2023-01-01

## 2023-07-05 PROBLEM — R26.2 DIFFICULTY WALKING: Status: ACTIVE | Noted: 2023-01-01

## 2023-07-05 NOTE — ED NOTES
Nursing report ED to floor  Bob Monahan  73 y.o.  male    HPI :   Chief Complaint   Patient presents with    Back Pain     Back pain, surgery x1 week ago        Admitting doctor:   Nany Powers MD    Admitting diagnosis:   The primary encounter diagnosis was Postoperative back pain. Diagnoses of Tachycardia and Leukocytosis, unspecified type were also pertinent to this visit.    Code status:   Current Code Status       Date Active Code Status Order ID Comments User Context       Prior            Allergies:   Atorvastatin    Isolation:   No active isolations    Intake and Output  No intake or output data in the 24 hours ending 07/05/23 1539    Weight:       07/05/23  1121   Weight: 65.8 kg (145 lb)       Most recent vitals:   Vitals:    07/05/23 1156 07/05/23 1256 07/05/23 1326 07/05/23 1327   BP: 125/97 (!) 146/106 124/97    BP Location:       Patient Position:       Pulse: (!) 129 120  118   Resp:       Temp:       TempSrc:       SpO2: 93% 94%  96%   Weight:       Height:           Active LDAs/IV Access:   Lines, Drains & Airways       Active LDAs       Name Placement date Placement time Site Days    Peripheral IV 07/05/23 1107 Anterior;Distal;Left Forearm 07/05/23  1107  Forearm  less than 1                    Labs (abnormal labs have a star):   Labs Reviewed   COMPREHENSIVE METABOLIC PANEL - Abnormal; Notable for the following components:       Result Value    Glucose 185 (*)     BUN 42 (*)     Creatinine 1.40 (*)     CO2 17.4 (*)     Alkaline Phosphatase 145 (*)     BUN/Creatinine Ratio 30.0 (*)     Anion Gap 18.6 (*)     eGFR 53.4 (*)     All other components within normal limits    Narrative:     GFR Normal >60  Chronic Kidney Disease <60  Kidney Failure <15    The GFR formula is only valid for adults with stable renal function between ages 18 and 70.   CBC WITH AUTO DIFFERENTIAL - Abnormal; Notable for the following components:    WBC 32.39 (*)     All other components within normal limits  "  MANUAL DIFFERENTIAL - Abnormal; Notable for the following components:    Neutrophil % 77.6 (*)     Lymphocyte % 5.1 (*)     Eosinophil % 12.2 (*)     Neutrophils Absolute 25.13 (*)     Monocytes Absolute 1.65 (*)     Eosinophils Absolute 3.95 (*)     All other components within normal limits   PROCALCITONIN - Abnormal; Notable for the following components:    Procalcitonin 0.39 (*)     All other components within normal limits    Narrative:     As a Marker for Sepsis (Non-Neonates):    1. <0.5 ng/mL represents a low risk of severe sepsis and/or septic shock.  2. >2 ng/mL represents a high risk of severe sepsis and/or septic shock.    As a Marker for Lower Respiratory Tract Infections that require antibiotic therapy:    PCT on Admission    Antibiotic Therapy       6-12 Hrs later    >0.5                Strongly Recommended  >0.25 - <0.5        Recommended   0.1 - 0.25          Discouraged              Remeasure/reassess PCT  <0.1                Strongly Discouraged     Remeasure/reassess PCT    As 28 day mortality risk marker: \"Change in Procalcitonin Result\" (>80% or <=80%) if Day 0 (or Day 1) and Day 4 values are available. Refer to [email or web address]-[email or web address]    Change in PCT <=80%  A decrease of PCT levels below or equal to 80% defines a positive change in PCT test result representing a higher risk for 28-day all-cause mortality of patients diagnosed with severe sepsis for septic shock.    Change in PCT >80%  A decrease of PCT levels of more than 80% defines a negative change in PCT result representing a lower risk for 28-day all-cause mortality of patients diagnosed with severe sepsis or septic shock.      LACTIC ACID, PLASMA - Normal   BLOOD CULTURE   BLOOD CULTURE   URINALYSIS W/ MICROSCOPIC IF INDICATED (NO CULTURE)   CBC AND DIFFERENTIAL    Narrative:     The following orders were created for panel order CBC & Differential.  Procedure                               Abnormality         Status         "             ---------                               -----------         ------                     CBC Auto Differential[[id number]]        Abnormal            Final result                 Please view results for these tests on the individual orders.       EKG:   No orders to display       Meds given in ED:   Medications   HYDROmorphone (DILAUDID) injection 0.5 mg (has no administration in time range)   methocarbamol (ROBAXIN) tablet 750 mg (750 mg Oral Given 23 1312)   oxyCODONE-acetaminophen (PERCOCET) 5-325 MG per tablet 2 tablet (has no administration in time range)   HYDROmorphone (DILAUDID) injection 1 mg (1 mg Intravenous Given 23 1203)   ondansetron (ZOFRAN) injection 4 mg (4 mg Intravenous Given 23 1202)   HYDROmorphone (DILAUDID) injection 0.5 mg (0.5 mg Intravenous Given 23 1246)   gadobenate dimeglumine (MULTIHANCE) injection 13 mL (13 mL Intravenous Given 23 1530)       Imaging results:  No radiology results for the last day    Ambulatory status:   - assist    Social issues:   Social History     Socioeconomic History    Marital status:    Tobacco Use    Smoking status: Former     Packs/day: 0.25     Years: 25.00     Pack years: 6.25     Types: Cigarettes     Quit date:      Years since quittin.5    Smokeless tobacco: Never   Vaping Use    Vaping Use: Never used   Substance and Sexual Activity    Alcohol use: Not Currently     Alcohol/week: 7.0 standard drinks     Types: 7 Shots of liquor per week    Drug use: No    Sexual activity: Defer       NIH Stroke Scale:       Janki Borden RN  23 15:39 EDT
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
## 2023-07-05 NOTE — PLAN OF CARE
Goal Outcome Evaluation:   Patient is alert x 3.  Complain of pain to lower back 10/10. Family at bedside.  Call light in reach.  Will continue to monitor

## 2023-07-05 NOTE — ED NOTES
Pt had kyphoplasty on 6/27. Pt states that since surgery, pt has had increased pain. Pt was sent here by surgery for evaluation. No relief with pain medications. Reports difficulty walking. No loss of bowel or bladder functions
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
## 2023-07-06 NOTE — SIGNIFICANT NOTE
07/06/23 1020   OTHER   Discipline occupational therapist   Rehab Time/Intention   Session Not Performed unable to evaluate, medical status change  (discuss medical status with ROSENDA NP Celia Horvath and agree for OT to sign off at this time. ROSENDA to reorder therapy if/when appropriate.  OT discusses with RN.)

## 2023-07-06 NOTE — PLAN OF CARE
Goal Outcome Evaluation:            Patient is currently resting in bed.  Patient was complaining of pain 10/10 in lower back that radiates to BLE.  Medication given according to order.  Pain regimen appears to be working at this time.  Family at bedside.  Will continue to monitor
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
## 2023-07-06 NOTE — CASE MANAGEMENT/SOCIAL WORK
Discharge Planning Assessment  [facility]     Patient Name: Bob Monahan  MRN: [id number]  Today's Date: 7/6/2023    Admit Date: 7/5/2023    Plan: Transportation will need to be provided. Follow for clinical needs Home with spouse.    Discharge Needs Assessment     Row Name 07/06/23 1320       Living Environment    People in Home spouse    Current Living Arrangements home    Potentially Unsafe Housing Conditions none    Primary Care Provided by self    Provides Primary Care For no one    Family Caregiver if Needed none    Quality of Family Relationships helpful;involved;supportive    Able to Return to Prior Arrangements yes       Resource/Environmental Concerns    Resource/Environmental Concerns none       Transition Planning    Patient/Family Anticipates Transition to home with family    Patient/Family Anticipated Services at Transition none    Transportation Anticipated health plan transportation       Discharge Needs Assessment    Readmission Within the Last 30 Days no previous admission in last 30 days    Equipment Currently Used at Home wheelchair;walker, standard    Anticipated Changes Related to Illness none    Provided Post Acute Provider List? N/A    Provided Post Acute Provider Quality & Resource List? N/A               Discharge Plan     Row Name 07/06/23 1322       Plan    Plan Transportation will need to be provided. Follow for clinical needs    Plan Comments CCP met with the spouse and patient at bed time. Patient was asleep so CCP spoke with spouse Amanda. Amanda confirmed the information on his face sheet is accurate. Patients primary care is Tiarra Yi. Patient pharmacy is [facility] on [address]. Patient has never used HH. Patients home two floors with one flight of stairs with hand rails. There are 6-7 stairs to get into the home with hand rails on both sides. Patient uses a borrowed walker and wheelchair at home. Pt lives at home with wife. Patient will need transport at  discharge. CCP to follow for clinical needs.              Continued Care and Services - Admitted Since 7/5/2023    Coordination has not been started for this encounter.       Expected Discharge Date and Time     Expected Discharge Date Expected Discharge Time    Jul 10, 2023          Demographic Summary     Row Name 07/06/23 1319       General Information    Admission Type observation    Arrived From emergency department    Referral Source admission list    Reason for Consult discharge planning    Preferred Language English               Functional Status     Row Name 07/06/23 1320       Functional Status    Usual Activity Tolerance fair    Current Activity Tolerance fair       Mental Status    General Appearance WDL WDL       Employment/    Employment Status employed full-time               Psychosocial    No documentation.                Abuse/Neglect    No documentation.                Legal    No documentation.                Substance Abuse    No documentation.                Patient Forms    No documentation.

## 2023-07-06 NOTE — PROGRESS NOTES
[facility] THORACIC/[facility]OSURGERY PROGRESS NOTE      CC: POD #9 L1 kyphoplasty back with acute back pain found to have diffuse metastatic disease       Subjective     Interval History: patient currently returning from CT and had been given Ativan prior to. no events overnight. per nursing has been in excruciating pain, nigel meds adjusted.    ROS:  unobtainable at present- just got back from CT and was sedated with Ativan    Objective     Vital signs in last 24 hours:  Temp:  [97.9 °F (36.6 °C)-98.3 °F (36.8 °C)] 98.2 °F (36.8 °C)  Heart Rate:  [] 98  Resp:  [18-20] 18  BP: (105-146)/() 136/86    Intake/Output this shift:  I/O this shift:  In: -   Out: 500 [Urine:500]    LABS:  Results from last 7 days   Lab Units 07/06/23  0644 07/05/23  1202   WBC 10*3/mm3 26.85* 32.39*   HEMOGLOBIN g/dL 13.5 15.2   HEMATOCRIT % 38.3 43.7   PLATELETS 10*3/mm3 207 241       Results from last 7 days   Lab Units 07/06/23  0644 07/05/23  1202   SODIUM mmol/L 137 137   POTASSIUM mmol/L 4.3 4.2   CHLORIDE mmol/L 109* 101   CO2 mmol/L 17.0* 17.4*   BUN mg/dL 43* 42*   CREATININE mg/dL 1.19 1.40*   GLUCOSE mg/dL 121* 185*   CALCIUM mg/dL 9.4 10.2         IMAGING STUDIES:  CT CAP pending     I personally viewed and interpreted the patient's chart.    Meds reviewed  Scheduled Meds:lidocaine, 2 patch, Transdermal, Q24H  mesalamine, 1.2 g, Oral, Daily With Breakfast  methocarbamol, 750 mg, Oral, Q6H  metoprolol succinate XL, 50 mg, Oral, Daily  pancrelipase (Lip-Prot-Amyl), 12,000 units of lipase, Oral, TID With Meals  pantoprazole, 40 mg, Oral, Q AM  rosuvastatin, 40 mg, Oral, Daily  senna-docusate sodium, 2 tablet, Oral, BID  sodium chloride, 10 mL, Intravenous, Q12H      Continuous Infusions:   PRN Meds:.  acetaminophen **OR** acetaminophen **OR** acetaminophen    senna-docusate sodium **AND** polyethylene glycol **AND** bisacodyl **AND** bisacodyl    calcium carbonate    HYDROmorphone    melatonin    nitroglycerin     "ondansetron **OR** ondansetron    oxyCODONE-acetaminophen    sodium chloride    sodium chloride      Physical Exam:    Physical exam: limited as wanted patient to remain comfortable.    sedated on ativan   gait deferred      Assessment & Plan     ASSESSMENT:      Postoperative back pain    Chronic renal insufficiency, stage III (moderate)    S/P CABG (coronary artery bypass graft)    Impaired fasting glucose    Benign essential hypertension    History of aortic valve replacement    Compression fracture of L1 vertebra    Status post kyphoplasty    Tachycardia    History of esophageal dysphagia    Leukocytosis    History of recent steroid use    Difficulty walking    Constipated      PLAN:     pain control- hold off on PT/OT for time being   await CT results   await oncology recommendations   okay to place keller catheter     I discussed the patient's findings and my recommendations with nursing staff and primary care team    During patient visit, I utilized appropriate personal protective equipment including mask and gloves.  Mask used was standard procedure mask. Appropriate PPE was worn during the entire visit.  Hand hygiene was completed before and after.     Addendum: CT chest abdomen and pelvis demonstrates multiple lesions with primary suspected to be from his left lung.  Percutaneous biopsy has been recommended.  There is nothing else for us to offer at this time.  We will sign off but will be available as needed.       LOS: 0 days       Janki Horvath, APRN  7/6/2023  12:11 EDT    \"Dictated utilizing Dragon dictation\".      "

## 2023-07-06 NOTE — SIGNIFICANT NOTE
07/06/23 1303   OTHER   Discipline physical therapist   Rehab Time/Intention   Session Not Performed   (Neurosurgery requesting to hold off on PT/OT at this time.  DC'd PT order.   Please have neurosurgery  re-order PT as approrpiate.)

## 2023-07-06 NOTE — PROGRESS NOTES
Name: Bob Monahan ADMIT: 2023   : 1950  PCP: Tiarra Yi PA-C    MRN: [id number] LOS: 0 days   AGE/SEX: 73 y.o. male  ROOM: [facility]     Subjective   Subjective   Patient resting comfortably in bed, had received Ativan for CT scan and is now sleeping soundly not slept for many days.  Family request that we let him rest    Review of Systems  Unable to obtain secondary to sleeping     Objective   Objective   Vital Signs  Temp:  [97.7 °F (36.5 °C)-98.3 °F (36.8 °C)] 97.7 °F (36.5 °C)  Heart Rate:  [] 126  Resp:  [18-20] 18  BP: (105-137)/() 137/92  SpO2:  [90 %-97 %] 97 %  on   ;   Device (Oxygen Therapy): room air  Body mass index is 24.89 kg/m².  Physical Exam  Vitals and nursing note reviewed.   Constitutional:       General: He is not in acute distress.     Appearance: He is ill-appearing (Chronically).   Cardiovascular:      Rate and Rhythm: Regular rhythm. Tachycardia present.   Pulmonary:      Effort: Pulmonary effort is normal. No respiratory distress.   Abdominal:      General: Abdomen is flat. There is no distension.      Tenderness: There is no abdominal tenderness.   Musculoskeletal:         General: No swelling or deformity.      Comments: Lumbar tenderness   Skin:     General: Skin is warm and dry.      Coloration: Skin is not jaundiced.   Neurological:      General: No focal deficit present.      Mental Status: He is alert. Mental status is at baseline.       Results Review     I reviewed the patient's new clinical results.  Results from last 7 days   Lab Units 23  0644 23  1202   WBC 10*3/mm3 26.85* 32.39*   HEMOGLOBIN g/dL 13.5 15.2   PLATELETS 10*3/mm3 207 241     Results from last 7 days   Lab Units 23  0644 23  1202   SODIUM mmol/L 137 137   POTASSIUM mmol/L 4.3 4.2   CHLORIDE mmol/L 109* 101   CO2 mmol/L 17.0* 17.4*   BUN mg/dL 43* 42*   CREATININE mg/dL 1.19 1.40*   GLUCOSE mg/dL 121* 185*   Estimated Creatinine Clearance: 52.2 mL/min (by  C-G formula based on SCr of 1.19 mg/dL).  Results from last 7 days   Lab Units 07/06/23  0644 07/05/23  1202   ALBUMIN g/dL 2.7* 3.6   BILIRUBIN mg/dL 0.4 0.4   ALK PHOS U/L 133* 145*   AST (SGOT) U/L 10 13   ALT (SGPT) U/L 14 19     Results from last 7 days   Lab Units 07/06/23  0644 07/05/23  1202   CALCIUM mg/dL 9.4 10.2   ALBUMIN g/dL 2.7* 3.6   MAGNESIUM mg/dL 2.3  --    PHOSPHORUS mg/dL 3.8  --      Results from last 7 days   Lab Units 07/05/23  1401 07/05/23  1202   PROCALCITONIN ng/mL  --  0.39*   LACTATE mmol/L 2.0  --    No results found for: COVID19  No results found for: HGBA1C, POCGLU    CT Abdomen Pelvis With Contrast, CT Chest With Contrast Diagnostic  Narrative: CT CHEST, ABDOMEN AND PELVIS WITH CONTRAST     HISTORY: 73-year-old male with evaluation for metastatic disease.     TECHNIQUE: Axial CT images of the chest abdomen and pelvis were obtained  following administration of intravenous and oral contrast. Coronal and  sagittal reconstructions were then obtained.     COMPARISON: CT chest dated 05/05/2023     FINDINGS:     CT CHEST: No pathological axillary lymphadenopathy. Changes of median  sternotomy with aortic valve replacement are present. Calcified coronary  artery disease is present.     There is bulky mediastinal lymphadenopathy. Index precarinal lymph node  measures up to 1.7 cm in short axis dimension. Prevascular lymph node  measures up to 1.6 cm in short axis dimension. Subcarinal  lymphadenopathy measuring up to 2.3 cm in short axis dimension. In  addition there is marked bilateral hilar lymphadenopathy. Marked  interval increase in homogeneous soft tissue seen within the left  perihilar and posterior left lower lobe. This soft tissue adjacent to  the descending thoracic aorta measures approximately 4.4 x 2.6 cm. This  is highly concerning for a primary bronchogenic neoplasm. In addition  there are numerous scattered pulmonary nodules bilaterally in a pattern  consistent with metastatic  disease. Dilated ascending thoracic aorta  measuring up to 3.8 cm. The patient's demonstrated thoracic metastases  is subtle on CT.     CT ABDOMEN AND PELVIS:Numerous hypoattenuating foci within both lobes of  the liver highly suspicious for metastatic disease. The spleen is  somewhat bulky. The gallbladder is distended and otherwise normal. There  is marked dilatation of the pancreatic duct within the body and tail of  the pancreas to the level of the large calcification within the head  region, mildly thickened appearance of the left adrenal gland is  unchanged. No renal calculi or hydronephrosis. Urinary bladder is  moderately distended and normal. Diverticulosis is present. No evidence  of bowel obstruction. An enlarged retrocrural lymph node measures up to  1.6 cm in short axis dimension. There is marked atherosclerotic  calcification within the abdominal aorta and its branches. Status post  kyphoplasty at L1 level where there is paravertebral soft tissue. This  has been better evaluated on the MR of the spine.     Impression: 1. A primary bronchogenic neoplasm is suspected within the left lower  lobe. Metastatic mediastinal, bilateral hilar and retrocrural  lymphadenopathy is identified. Additional bilateral pulmonary nodules  and hepatic metastatic disease is identified. The thoracic vertebral  body metastases are subtle on CT imaging. Percutaneous biopsy may be  performed from the liver lesions.  2. Marked dilatation of the pancreatic duct within the body and tail of  the pancreas leading up to calcification within the neck/head region.  This may represent an obstructing pancreatic duct calculus. Further  workup recommended.  3. Dilated ascending thoracic aorta.     Radiation dose reduction techniques were utilized, including automated  exposure control and exposure modulation based on body size.        MRI Lumbar Spine With & Without Contrast, MRI Thoracic Spine With & Without Contrast  Narrative: MRI  EXAMINATION OF THE LUMBAR AND THORACIC SPINE WITH AND WITHOUT  CONTRAST     HISTORY: Kyphoplasty, back pain.     COMPARISON: MRI of the lumbar spine 05/05/2023.     FINDINGS:     MRI EXAMINATION OF THE LUMBAR SPINE WITHOUT CONTRAST:     The patient has undergone kyphoplasty at L1. There is moderate loss of  disc height at L1 and mild bony retropulsion of the posterior body.  There is epidural soft tissue present posterior to the L1 vertebral body  with components that enhance and components that did not enhance. The  nonenhancing component is more prominent to the right of midline and  measures approximately 11 mm in the craniocaudal dimension and 6 mm in  the AP dimension. It is T2 hyperintense.     Multiple areas of T1 hypointensity are appreciated involving the lumbar  spine which are new versus the MRI examination of 05/05/2023. This  includes an area of signal loss involving the T11 vertebral body  measuring approximately 3.5 cm in size, the T12 vertebral body centrally  measuring 2.8 cm, the posterior lateral aspect of T12 to the left (1.9  cm, the lateral aspect of T12 to the right (15 mm)     The lateral aspect of L2 to the left (22 mm), the posterior aspect of L2  (12 mm), the anterior aspect of L2 inferiorly (7 mm), the L3 vertebral  body centrally (2 cm) the L4 vertebral body posteriorly and to the left  of midline (1 cm), the anterolateral aspect of L5 to the left (2.7 cm).  These areas are T2 hyperintense. After contrast administration there was  nonenhancement or minimal enhancement related to these areas. There is  epidural enhancement from T12 to L1 as well as enhancement of the cauda  equina. There is enhancing soft tissue laterally bilaterally at the  level of L1 more prominent to the left where there is involvement of the  medial aspect of the psoas muscle and enhancing tissue extending into  the neural foramen of L1-L2 to the left.     T12-L1: There is mild central canal stenosis. There is mild  foraminal  stenosis on the left secondary to soft tissues extending into the neural  foramen which enhances.     L1-L2: There is severe central canal stenosis secondary to enhancing  epidural tissue anteriorly as well as laterally bilaterally and again,  enhancing tissue is appreciated extending into the neural foramen  bilaterally.     L2-L3: There is mild canal stenosis secondary to a broad-based disc  osteophyte complex which is more prominent to the left. Mild foraminal  stenosis is present on the left.     L3-L4: There is mild canal stenosis secondary to a broad-based disc  osteophyte complex and to mild facet degenerative disease.     L4-L5: Mild-to-moderate facet degenerative disease and mild central disc  bulge is noted.     L5-S1: Moderate facet degenerative disease is present bilaterally.     MRI EXAMINATION OF THE THORACIC SPINE WITH AND WITHOUT CONTRAST:     FINDINGS: The alignment of the thoracic spine is within normal limits.  Signal intensity within the cord is normal on the sagittal T2 sequence.  Areas of signal loss are appreciated involving the T9, T10, T11 and T12  vertebral bodies. Areas of signal loss are also appreciated involving  the lateral aspects of the C7, T1 and T3 vertebral bodies to the left  and involving the T4 vertebral body centrally. There is T2 hyperintense  epidural soft tissue appreciated posterior to the T10 and to lesser  extent T11 vertebral bodies which enhance after contrast administration.  Signal intensity within the cord is normal on the sagittal and axial T2  sequences.     There is severe spinal stenosis present at L1 as described above. There  is moderate canal stenosis secondary to epidural enhancing tissue  beginning at T9/T10 and extending to T10/T11 and mild stenosis due to  epidural enhancing tissue to the left at T11.     Although limited by motion multiple nodular areas of increased signal  are appreciated involving the lungs bilaterally likely  representing  diffuse metastatic disease. There is soft tissue appreciated posterior  lateral to the descending thoracic aorta which measures 2.6 cm in  thickness (previously approximately 1.5 cm in the same plane).     Impression: There is diffuse metastatic disease involving the lungs,  thoracic spine and lumbar spine as described in detail above. There is  also epidural tumor present in the lower thoracic region extending to L1  where it is most severe, accentuated by mild bony retropulsion of L1.  Dural tumor also extends into the neural foramen at L1-L2 and to lesser  extent T12-L1. All of these appear to be new versus a CT examination of  the chest of 05/05/2023 and MRI examination of the lumbar spine of  05/05/2023. The soft tissue mass posterior lateral to the descending  thoracic aorta has increased in thickness from 1.5 cm to 2.6 cm.     The above information was called to and discussed with Dr. Hickman.     This report was finalized on 7/6/2023 7:28 AM by Dr. Tim Dey M.D.       I reviewed the patient's daily medications.  Scheduled Medications  lidocaine, 2 patch, Transdermal, Q24H  mesalamine, 1.2 g, Oral, Daily With Breakfast  methocarbamol, 750 mg, Oral, Q6H  metoprolol succinate XL, 50 mg, Oral, Daily  pancrelipase (Lip-Prot-Amyl), 12,000 units of lipase, Oral, TID With Meals  pantoprazole, 40 mg, Oral, Q AM  rosuvastatin, 40 mg, Oral, Daily  senna-docusate sodium, 2 tablet, Oral, BID  sodium chloride, 10 mL, Intravenous, Q12H    Infusions   Diet  Diet: Regular/House Diet; Texture: Regular Texture (IDDSI 7); Fluid Consistency: Thin (IDDSI 0)         I have personally reviewed:  [x]  Laboratory   [x]  Microbiology   [x]  Radiology   [x]  EKG/Telemetry   []  Cardiology/Vascular   []  Pathology   [x]  Records     Assessment/Plan     Active Hospital Problems    Diagnosis  POA    **Postoperative back pain [G89.18, M54.9]  Yes    Status post kyphoplasty [Z98.890]  Not Applicable    Tachycardia [R00.0]   Yes    History of esophageal dysphagia [Z87.898]  Yes    Leukocytosis [D72.829]  Yes    History of recent steroid use [Z92.241]  Not Applicable    Difficulty walking [R26.2]  Yes    Constipated [K59.00]  Yes    Compression fracture of L1 vertebra [S32.010A]  Yes    Impaired fasting glucose [R73.01]  Yes    History of aortic valve replacement [Z95.2]  Not Applicable    S/P CABG (coronary artery bypass graft) [Z95.1]  Not Applicable    Chronic renal insufficiency, stage III (moderate) [N18.30]  Yes    Benign essential hypertension [I10]  Yes      Resolved Hospital Problems   No resolved problems to display.       73 y.o. male admitted with Postoperative back pain.    Metastatic cancer  Primary cancer unknown  -MRI lumbar spine showing thoracic spine and lumbar spine metastases with epidural tumor  -CT chest/abdomen/pelvis with concern for primary bronchogenic neoplasm in the left lower lobe.  Additional bilateral pulmonary nodules and hepatic metastatic disease.  -On chart review patient had been following up here with lung nodule and had a bronc/EBUS in May with no malignant cells on pathology  -Holding aspirin Plavix so that he can have a biopsy done on Monday by IR  -Discussed with Dr. Lucas    Marked dilatation of the pancreatic duct  Chronic pancreatic ductal calculus  -Reviewed [place] records pancreatic ductal calculus is chronic  -CA 19-9 is actually decreased from previous been high as 150 at [place] in the past    Marked leukocytosis  -Downtrending without any antibiotics, procalcitonin 0.39  -Likely secondary to recent steroids as well as malignancy  -Continue to monitor off antibiotics  -Blood cultures with no growth    Coronary artery disease status post CABG  Hypertension  PVD  -Hold home Plavix and aspirin  -Continue home metoprolol, statin    Colitis-continue home mesalamine although GI as an outpatient    ILD    CKD 3B, stable, baseline    SCDs for DVT prophylaxis.  Full code.  Discussed with family  and nursing staff.  Anticipate discharge home with family next week.    Expected Discharge Date: 7/10/2023; Expected Discharge Time:       Manny Santo MD  [facility]  07/06/23  16:06 EDT

## 2023-07-06 NOTE — CONSULTS
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 examination of the lumbar spine of  05/05/2023. The soft tissue mass posterior lateral to the descending  thoracic aorta has increased in thickness from 1.5 cm to 2.6 cm.     The above information was called to and discussed with Dr. Hickman.     This report was finalized on 7/6/2023 7:28 AM by Dr. Tim Dey M.D.               Assessment / Recommendations:  Liver nodules  Lymphadenopathy-mediastinal and hilar  ILD - has declined antifibrotic in office per notes  Former smoker  Occupational exposure history.  Liver lesions  Dilated pancreatic duct  CKD  Coronary artery disease status post CABG  Hypertension  History of aortic valve replacement  Leukocytosis  Status post kyphoplasty    Worrisome for metastatic malignancy.   Discussed approach for biopsy with oncologist Dr. Lucas has already  dw IR and plans for bone biopsy.  Best chance of getting good tissue piece with less risk.  Pain control per primary reached out to them.  Can discuss as outpt antifibrotics - certainly not as inpatient at this time in current setting.  Reviewed outpatient clinic notes.  This is my first time seeing the patient.  Reviewed prior bronch and path, ct scans.       King Lopez MD  [facility]  07/06/23  14:23 EDT

## 2023-07-06 NOTE — PLAN OF CARE
Problem: Adult Inpatient Plan of Care  Goal: Plan of Care Review  Outcome: Ongoing, Not Progressing  Flowsheets (Taken 7/6/2023 0556)  Progress: no change  Plan of Care Reviewed With: patient  Outcome Evaluation: Pt in pain this shift, PRN pain meds admin x4 please see eMAR, Pt bladder scanned this shift with residual >500, straight cathed per orders/protocol x1. Heme/Onc consult placed, NS 125mL/hr infusing, Pt has been Strict NPO since MN per sayra sx.  Goal: Patient-Specific Goal (Individualized)  Outcome: Ongoing, Not Progressing  Goal: Absence of Hospital-Acquired Illness or Injury  Outcome: Ongoing, Not Progressing  Intervention: Identify and Manage Fall Risk  Recent Flowsheet Documentation  Taken 7/6/2023 0400 by Tiarra Florian RN  Safety Promotion/Fall Prevention: safety round/check completed  Taken 7/6/2023 0220 by Tiarra Florian RN  Safety Promotion/Fall Prevention: safety round/check completed  Taken 7/6/2023 0015 by Tiarra Florian RN  Safety Promotion/Fall Prevention: safety round/check completed  Taken 7/5/2023 2300 by Tiarra Florian RN  Safety Promotion/Fall Prevention: safety round/check completed  Taken 7/5/2023 2017 by Tiarra Florian RN  Safety Promotion/Fall Prevention: safety round/check completed  Intervention: Prevent Skin Injury  Recent Flowsheet Documentation  Taken 7/5/2023 2017 by Tiarra Florian RN  Skin Protection:   adhesive use limited   tubing/devices free from skin contact  Goal: Optimal Comfort and Wellbeing  Outcome: Ongoing, Not Progressing  Intervention: Provide Person-Centered Care  Recent Flowsheet Documentation  Taken 7/5/2023 2017 by Tiarra Florian RN  Trust Relationship/Rapport: care explained  Goal: Readiness for Transition of Care  Outcome: Ongoing, Not Progressing     Problem: Fall Injury Risk  Goal: Absence of Fall and Fall-Related Injury  Outcome: Ongoing, Not Progressing  Intervention: Identify and Manage Contributors  Recent Flowsheet Documentation  Taken 7/5/2023 2017 by Anival  NADIA Mayen  Medication Review/Management: medications reviewed  Intervention: Promote Injury-Free Environment  Recent Flowsheet Documentation  Taken 7/6/2023 0400 by Tiarra Florian RN  Safety Promotion/Fall Prevention: safety round/check completed  Taken 7/6/2023 0220 by Tiarra Florian RN  Safety Promotion/Fall Prevention: safety round/check completed  Taken 7/6/2023 0015 by Tiarra Florian RN  Safety Promotion/Fall Prevention: safety round/check completed  Taken 7/5/2023 2300 by Tiarra Florian RN  Safety Promotion/Fall Prevention: safety round/check completed  Taken 7/5/2023 2017 by Tiarra Florian RN  Safety Promotion/Fall Prevention: safety round/check completed     Problem: Skin Injury Risk Increased  Goal: Skin Health and Integrity  Outcome: Ongoing, Not Progressing  Intervention: Optimize Skin Protection  Recent Flowsheet Documentation  Taken 7/5/2023 2017 by Tiarra Florian RN  Skin Protection:   adhesive use limited   tubing/devices free from skin contact     Problem: Pain Acute  Goal: Acceptable Pain Control and Functional Ability  Outcome: Ongoing, Not Progressing  Intervention: Prevent or Manage Pain  Recent Flowsheet Documentation  Taken 7/5/2023 2017 by Tiarra Florian RN  Medication Review/Management: medications reviewed   Goal Outcome Evaluation:  Plan of Care Reviewed With: patient        Progress: no change  Outcome Evaluation: Pt in pain this shift, PRN pain meds admin x4 please see eMAR, Pt bladder scanned this shift with residual >500, straight cathed per orders/protocol x1. Heme/Onc consult placed, NS 125mL/hr infusing, Pt has been Strict NPO since MN per sayra grimm.

## 2023-07-07 NOTE — PLAN OF CARE
Problem: Adult Inpatient Plan of Care  Goal: Plan of Care Review  Outcome: Ongoing, Not Progressing  Flowsheets (Taken 7/7/2023 0559)  Progress: no change  Plan of Care Reviewed With: patient  Goal: Patient-Specific Goal (Individualized)  Outcome: Ongoing, Not Progressing  Goal: Absence of Hospital-Acquired Illness or Injury  Outcome: Ongoing, Not Progressing  Intervention: Identify and Manage Fall Risk  Recent Flowsheet Documentation  Taken 7/7/2023 0407 by Tiarra Florian RN  Safety Promotion/Fall Prevention: safety round/check completed  Taken 7/7/2023 0200 by Tiarra Florian RN  Safety Promotion/Fall Prevention: safety round/check completed  Taken 7/7/2023 0000 by Tiarra Florian RN  Safety Promotion/Fall Prevention: safety round/check completed  Taken 7/6/2023 2200 by Tiarra Florian RN  Safety Promotion/Fall Prevention: safety round/check completed  Taken 7/6/2023 2011 by Tiarra Florian RN  Safety Promotion/Fall Prevention: safety round/check completed  Goal: Optimal Comfort and Wellbeing  Outcome: Ongoing, Not Progressing  Intervention: Monitor Pain and Promote Comfort  Recent Flowsheet Documentation  Taken 7/7/2023 0542 by Tiarra Florian RN  Pain Management Interventions: see MAR  Taken 7/7/2023 0135 by Tiarra Florian RN  Pain Management Interventions: see MAR  Taken 7/6/2023 2124 by Tiarra Florian RN  Pain Management Interventions: see MAR  Goal: Readiness for Transition of Care  Outcome: Ongoing, Not Progressing     Problem: Fall Injury Risk  Goal: Absence of Fall and Fall-Related Injury  Outcome: Ongoing, Not Progressing  Intervention: Identify and Manage Contributors  Recent Flowsheet Documentation  Taken 7/6/2023 2011 by Tiarra Florian RN  Medication Review/Management: medications reviewed  Intervention: Promote Injury-Free Environment  Recent Flowsheet Documentation  Taken 7/7/2023 0407 by Tiarra Florian RN  Safety Promotion/Fall Prevention: safety round/check completed  Taken 7/7/2023 0200 by Tiarra Florian RN  Safety  Promotion/Fall Prevention: safety round/check completed  Taken 7/7/2023 0000 by Tiarra Florian RN  Safety Promotion/Fall Prevention: safety round/check completed  Taken 7/6/2023 2200 by Tiarra Florian RN  Safety Promotion/Fall Prevention: safety round/check completed  Taken 7/6/2023 2011 by Tiarra Florian RN  Safety Promotion/Fall Prevention: safety round/check completed     Problem: Skin Injury Risk Increased  Goal: Skin Health and Integrity  Outcome: Ongoing, Not Progressing     Problem: Pain Acute  Goal: Acceptable Pain Control and Functional Ability  Outcome: Ongoing, Not Progressing  Intervention: Prevent or Manage Pain  Recent Flowsheet Documentation  Taken 7/6/2023 2011 by Tiarra Florian RN  Medication Review/Management: medications reviewed  Intervention: Develop Pain Management Plan  Recent Flowsheet Documentation  Taken 7/7/2023 0542 by Tiarra Florian RN  Pain Management Interventions: see MAR  Taken 7/7/2023 0135 by Tiarra Florian RN  Pain Management Interventions: see MAR  Taken 7/6/2023 2124 by Tiarra Florian RN  Pain Management Interventions: see MAR   Goal Outcome Evaluation:  Plan of Care Reviewed With: patient        Progress: no change  Outcome Evaluation: Pt in pain this shift, PRN pain meds admin x4 please see eMAR, Pt bladder scanned this shift with residual >500, straight cathed per orders/protocol x1. Heme/Onc consult placed, NS 125mL/hr infusing, Pt has been Strict NPO since MN per sayra grimm.

## 2023-07-07 NOTE — PROGRESS NOTES
Name: Bob Monahan ADMIT: 2023   : 1950  PCP: Tiarra Yi PA-C    MRN: [id number] LOS: 1 days   AGE/SEX: 73 y.o. male  ROOM: [facility]     Subjective   Subjective   Patient very uncomfortable in bed, and significant lower back pain.  Discussed findings of CT scan, his metastatic cancer.  Discussed that we do not really know treatment options or possible side effects from those treatment options until biopsy is done.  Biopsy cannot be done until Monday use of his Plavix and aspirin.    Review of Systems  Back pain  No fevers, chills, chest pain, dyspnea, cough, abdominal pain, nausea, vomiting, dysuria.     Objective   Objective   Vital Signs  Temp:  [97.6 °F (36.4 °C)-98.1 °F (36.7 °C)] 97.6 °F (36.4 °C)  Heart Rate:  [110-133] 110  Resp:  [18-20] 20  BP: (114-142)/(40-95) 142/95  SpO2:  [94 %-99 %] 98 %  on   ;   Device (Oxygen Therapy): room air  Body mass index is 24.89 kg/m².  Physical Exam  Vitals and nursing note reviewed.   Constitutional:       General: He is not in acute distress.     Appearance: He is ill-appearing (Chronically).   Cardiovascular:      Rate and Rhythm: Regular rhythm. Tachycardia present.   Pulmonary:      Effort: Pulmonary effort is normal. No respiratory distress.   Abdominal:      General: Abdomen is flat. There is no distension.      Tenderness: There is no abdominal tenderness.   Musculoskeletal:         General: No swelling or deformity.      Comments: Lumbar tenderness   Skin:     General: Skin is warm and dry.      Coloration: Skin is not jaundiced.   Neurological:      General: No focal deficit present.      Mental Status: He is alert. Mental status is at baseline.       Results Review     I reviewed the patient's new clinical results.  Results from last 7 days   Lab Units 23  0735 23  0644 23  1202   WBC 10*3/mm3 22.06* 26.85* 32.39*   HEMOGLOBIN g/dL 13.0 13.5 15.2   PLATELETS 10*3/mm3 190 207 241       Results from last 7 days   Lab Units  07/07/23  0735 07/06/23  0644 07/05/23  1202   SODIUM mmol/L 141 137 137   POTASSIUM mmol/L 4.5 4.3 4.2   CHLORIDE mmol/L 109* 109* 101   CO2 mmol/L 15.0* 17.0* 17.4*   BUN mg/dL 40* 43* 42*   CREATININE mg/dL 1.13 1.19 1.40*   GLUCOSE mg/dL 106* 121* 185*     Estimated Creatinine Clearance: 55 mL/min (by C-G formula based on SCr of 1.13 mg/dL).  Results from last 7 days   Lab Units 07/06/23  0644 07/05/23  1202   ALBUMIN g/dL 2.7* 3.6   BILIRUBIN mg/dL 0.4 0.4   ALK PHOS U/L 133* 145*   AST (SGOT) U/L 10 13   ALT (SGPT) U/L 14 19       Results from last 7 days   Lab Units 07/07/23  0735 07/06/23  0644 07/05/23  1202   CALCIUM mg/dL 9.6 9.4 10.2   ALBUMIN g/dL  --  2.7* 3.6   MAGNESIUM mg/dL  --  2.3  --    PHOSPHORUS mg/dL  --  3.8  --        Results from last 7 days   Lab Units 07/05/23  1401 07/05/23  1202   PROCALCITONIN ng/mL  --  0.39*   LACTATE mmol/L 2.0  --      No results found for: COVID19  No results found for: HGBA1C, POCGLU    CT Abdomen Pelvis With Contrast, CT Chest With Contrast Diagnostic  Narrative: CT CHEST, ABDOMEN AND PELVIS WITH CONTRAST     HISTORY: 73-year-old male with evaluation for metastatic disease.     TECHNIQUE: Axial CT images of the chest abdomen and pelvis were obtained  following administration of intravenous and oral contrast. Coronal and  sagittal reconstructions were then obtained.     COMPARISON: CT chest dated 05/05/2023     FINDINGS:     CT CHEST: No pathological axillary lymphadenopathy. Changes of median  sternotomy with aortic valve replacement are present. Calcified coronary  artery disease is present.     There is bulky mediastinal lymphadenopathy. Index precarinal lymph node  measures up to 1.7 cm in short axis dimension. Prevascular lymph node  measures up to 1.6 cm in short axis dimension. Subcarinal  lymphadenopathy measuring up to 2.3 cm in short axis dimension. In  addition there is marked bilateral hilar lymphadenopathy. Marked  interval increase in homogeneous  soft tissue seen within the left  perihilar and posterior left lower lobe. This soft tissue adjacent to  the descending thoracic aorta measures approximately 4.4 x 2.6 cm. This  is highly concerning for a primary bronchogenic neoplasm. In addition  there are numerous scattered pulmonary nodules bilaterally in a pattern  consistent with metastatic disease. Dilated ascending thoracic aorta  measuring up to 3.8 cm. The patient's demonstrated thoracic metastases  is subtle on CT.     CT ABDOMEN AND PELVIS:Numerous hypoattenuating foci within both lobes of  the liver highly suspicious for metastatic disease. The spleen is  somewhat bulky. The gallbladder is distended and otherwise normal. There  is marked dilatation of the pancreatic duct within the body and tail of  the pancreas to the level of the large calcification within the head  region, mildly thickened appearance of the left adrenal gland is  unchanged. No renal calculi or hydronephrosis. Urinary bladder is  moderately distended and normal. Diverticulosis is present. No evidence  of bowel obstruction. An enlarged retrocrural lymph node measures up to  1.6 cm in short axis dimension. There is marked atherosclerotic  calcification within the abdominal aorta and its branches. Status post  kyphoplasty at L1 level where there is paravertebral soft tissue. This  has been better evaluated on the MR of the spine.     Impression: 1. A primary bronchogenic neoplasm is suspected within the left lower  lobe. Metastatic mediastinal, bilateral hilar and retrocrural  lymphadenopathy is identified. Additional bilateral pulmonary nodules  and hepatic metastatic disease is identified. The thoracic vertebral  body metastases are subtle on CT imaging. Percutaneous biopsy may be  performed from the liver lesions.  2. Marked dilatation of the pancreatic duct within the body and tail of  the pancreas leading up to calcification within the neck/head region.  This may represent an  obstructing pancreatic duct calculus. Further  workup recommended.  3. Dilated ascending thoracic aorta.     Radiation dose reduction techniques were utilized, including automated  exposure control and exposure modulation based on body size.        MRI Lumbar Spine With & Without Contrast, MRI Thoracic Spine With & Without Contrast  Narrative: MRI EXAMINATION OF THE LUMBAR AND THORACIC SPINE WITH AND WITHOUT  CONTRAST     HISTORY: Kyphoplasty, back pain.     COMPARISON: MRI of the lumbar spine 05/05/2023.     FINDINGS:     MRI EXAMINATION OF THE LUMBAR SPINE WITHOUT CONTRAST:     The patient has undergone kyphoplasty at L1. There is moderate loss of  disc height at L1 and mild bony retropulsion of the posterior body.  There is epidural soft tissue present posterior to the L1 vertebral body  with components that enhance and components that did not enhance. The  nonenhancing component is more prominent to the right of midline and  measures approximately 11 mm in the craniocaudal dimension and 6 mm in  the AP dimension. It is T2 hyperintense.     Multiple areas of T1 hypointensity are appreciated involving the lumbar  spine which are new versus the MRI examination of 05/05/2023. This  includes an area of signal loss involving the T11 vertebral body  measuring approximately 3.5 cm in size, the T12 vertebral body centrally  measuring 2.8 cm, the posterior lateral aspect of T12 to the left (1.9  cm, the lateral aspect of T12 to the right (15 mm)     The lateral aspect of L2 to the left (22 mm), the posterior aspect of L2  (12 mm), the anterior aspect of L2 inferiorly (7 mm), the L3 vertebral  body centrally (2 cm) the L4 vertebral body posteriorly and to the left  of midline (1 cm), the anterolateral aspect of L5 to the left (2.7 cm).  These areas are T2 hyperintense. After contrast administration there was  nonenhancement or minimal enhancement related to these areas. There is  epidural enhancement from T12 to L1 as well  as enhancement of the cauda  equina. There is enhancing soft tissue laterally bilaterally at the  level of L1 more prominent to the left where there is involvement of the  medial aspect of the psoas muscle and enhancing tissue extending into  the neural foramen of L1-L2 to the left.     T12-L1: There is mild central canal stenosis. There is mild foraminal  stenosis on the left secondary to soft tissues extending into the neural  foramen which enhances.     L1-L2: There is severe central canal stenosis secondary to enhancing  epidural tissue anteriorly as well as laterally bilaterally and again,  enhancing tissue is appreciated extending into the neural foramen  bilaterally.     L2-L3: There is mild canal stenosis secondary to a broad-based disc  osteophyte complex which is more prominent to the left. Mild foraminal  stenosis is present on the left.     L3-L4: There is mild canal stenosis secondary to a broad-based disc  osteophyte complex and to mild facet degenerative disease.     L4-L5: Mild-to-moderate facet degenerative disease and mild central disc  bulge is noted.     L5-S1: Moderate facet degenerative disease is present bilaterally.     MRI EXAMINATION OF THE THORACIC SPINE WITH AND WITHOUT CONTRAST:     FINDINGS: The alignment of the thoracic spine is within normal limits.  Signal intensity within the cord is normal on the sagittal T2 sequence.  Areas of signal loss are appreciated involving the T9, T10, T11 and T12  vertebral bodies. Areas of signal loss are also appreciated involving  the lateral aspects of the C7, T1 and T3 vertebral bodies to the left  and involving the T4 vertebral body centrally. There is T2 hyperintense  epidural soft tissue appreciated posterior to the T10 and to lesser  extent T11 vertebral bodies which enhance after contrast administration.  Signal intensity within the cord is normal on the sagittal and axial T2  sequences.     There is severe spinal stenosis present at L1 as  described above. There  is moderate canal stenosis secondary to epidural enhancing tissue  beginning at T9/T10 and extending to T10/T11 and mild stenosis due to  epidural enhancing tissue to the left at T11.     Although limited by motion multiple nodular areas of increased signal  are appreciated involving the lungs bilaterally likely representing  diffuse metastatic disease. There is soft tissue appreciated posterior  lateral to the descending thoracic aorta which measures 2.6 cm in  thickness (previously approximately 1.5 cm in the same plane).     Impression: There is diffuse metastatic disease involving the lungs,  thoracic spine and lumbar spine as described in detail above. There is  also epidural tumor present in the lower thoracic region extending to L1  where it is most severe, accentuated by mild bony retropulsion of L1.  Dural tumor also extends into the neural foramen at L1-L2 and to lesser  extent T12-L1. All of these appear to be new versus a CT examination of  the chest of 05/05/2023 and MRI examination of the lumbar spine of  05/05/2023. The soft tissue mass posterior lateral to the descending  thoracic aorta has increased in thickness from 1.5 cm to 2.6 cm.     The above information was called to and discussed with Dr. Hickman.     This report was finalized on 7/6/2023 7:28 AM by Dr. Tim Dey M.D.       I reviewed the patient's daily medications.  Scheduled Medications  lidocaine, 2 patch, Transdermal, Q24H  mesalamine, 1.2 g, Oral, Daily With Breakfast  methocarbamol, 750 mg, Oral, Q6H  metoprolol tartrate, 50 mg, Oral, Q8H  oxyCODONE, 10 mg, Oral, Q4H While Awake  pancrelipase (Lip-Prot-Amyl), 12,000 units of lipase, Oral, TID With Meals  pantoprazole, 40 mg, Oral, Q AM  rosuvastatin, 40 mg, Oral, Daily  senna-docusate sodium, 2 tablet, Oral, BID  sodium bicarbonate, 650 mg, Oral, TID  sodium chloride, 10 mL, Intravenous, Q12H    Infusions   Diet  Diet: Regular/House Diet; Texture: Regular  Texture (IDDSI 7); Fluid Consistency: Thin (IDDSI 0)         I have personally reviewed:  [x]  Laboratory   [x]  Microbiology   [x]  Radiology   [x]  EKG/Telemetry   []  Cardiology/Vascular   []  Pathology   []  Records     Assessment/Plan     Active Hospital Problems    Diagnosis  POA    **Postoperative back pain [G89.18, M54.9]  Yes    Status post kyphoplasty [Z98.890]  Not Applicable    Tachycardia [R00.0]  Yes    History of esophageal dysphagia [Z87.898]  Yes    Leukocytosis [D72.829]  Yes    History of recent steroid use [Z92.241]  Not Applicable    Difficulty walking [R26.2]  Yes    Constipated [K59.00]  Yes    Compression fracture of L1 vertebra [S32.010A]  Yes    Impaired fasting glucose [R73.01]  Yes    History of aortic valve replacement [Z95.2]  Not Applicable    S/P CABG (coronary artery bypass graft) [Z95.1]  Not Applicable    Chronic renal insufficiency, stage III (moderate) [N18.30]  Yes    Benign essential hypertension [I10]  Yes      Resolved Hospital Problems   No resolved problems to display.       73 y.o. male admitted with Postoperative back pain.    Metastatic cancer  Primary cancer unknown but suspected to be lung  Intractable back pain  -MRI lumbar spine showing thoracic spine and lumbar spine metastases with epidural tumor  -CT chest/abdomen/pelvis with concern for primary bronchogenic neoplasm in the left lower lobe.  Additional bilateral pulmonary nodules and hepatic metastatic disease.  -On chart review patient had been following up here with lung nodule and had a bronc/EBUS in May with no malignant cells on pathology  -Holding aspirin Plavix so that he can have a biopsy done on Monday by IR  -Change Percocet as needed to oxycodone scheduled, Dilaudid for breakthrough pain, is requiring a lot of Dilaudid..  On Robaxin.  Will titrate pain medications as needed.    Marked dilatation of the pancreatic duct  Chronic pancreatic ductal calculus  -Reviewed [facility] records pancreatic ductal  calculus is chronic  -CA 19-9 is actually decreased from previous been high as 150 at [place] in the past    Marked leukocytosis  -Continues to downtrend without any antibiotics, procalcitonin 0.39  -Likely secondary to recent steroids as well as malignancy  -Continue to monitor off antibiotics  -Blood cultures with no growth    Coronary artery disease status post CABG  Hypertension  PVD  -Hold home Plavix and aspirin  -Continue home metoprolol, statin    Colitis-continue home mesalamine although GI as an outpatient    ILD    CKD 3B, stable, baseline  Metabolic acidosis  -Start sodium bicarb    Goals of care-discussed CODE STATUS with patient.  Currently full code.    SCDs for DVT prophylaxis.  Full code.  Discussed with family and nursing staff.  Anticipate discharge home with family next week.    Expected Discharge Date: 7/10/2023; Expected Discharge Time:       Manny Santo MD  [facility]  07/07/23  10:50 EDT

## 2023-07-07 NOTE — CASE MANAGEMENT/SOCIAL WORK
Continued Stay Note  [facility]     Patient Name: Bob Monahan  MRN: [id number]  Today's Date: 7/7/2023    Admit Date: 7/5/2023    Plan: pending clinical course   Discharge Plan       Row Name 07/07/23 1100       Plan    Plan pending clinical course    Patient/Family in Agreement with Plan yes    Plan Comments CCP noted patient's plan of care and remains following for assistance with discharge planning needs should they arise. PT/OT ordered. Miracle CHEW RN CCP                   Discharge Codes    No documentation.                 Expected Discharge Date and Time       Expected Discharge Date Expected Discharge Time    Jul 10, 2023               Miracle Hogan RN

## 2023-07-07 NOTE — PROGRESS NOTES
Following awaiting IR biopsy results to see if pulmonary intervention needed.  Will follow peripherally.  King Lopez MD  [facility]  07/07/23  07:38 EDT

## 2023-07-07 NOTE — PROGRESS NOTES
Subjective     HISTORY OF PRESENT ILLNESS:   No acute issues overnight.  Patient lying in bed, still complains of significant amount of pain in his back.  Sister at bedside.  Afebrile.  Vital stable.      Past Medical History, Past Surgical History, Social History, Family History have been reviewed and are without significant changes except as mentioned.    Review of Systems   A comprehensive 14 point review of systems was performed and was negative except as mentioned.    Medications:  The current medication list was reviewed in the EMR    ALLERGIES:    Allergies   Allergen Reactions    Atorvastatin Swelling, Hallucinations and Myalgia       Joint pain       Objective      Vitals:    07/07/23 0407 07/07/23 0416 07/07/23 0812 07/07/23 0930   BP: 114/80  142/95    BP Location: Right arm  Right arm    Patient Position: Lying  Lying    Pulse: (!) 128 (!) 130 (!) 133 110   Resp: 20  20    Temp: 97.7 °F (36.5 °C)  97.6 °F (36.4 °C)    TempSrc:   Oral    SpO2: 96%  98%    Weight:       Height:              No data to display                Physical Exam    CONSTITUTIONAL:  Vital signs reviewed.    EYES:  Conjunctiva and lids unremarkable.    EARS,NOSE,MOUTH,THROAT:  Ears and nose appear unremarkable.    RESPIRATORY:  Normal respiratory effort.    CARDIOVASCULAR:  Normal heart rate.  No significant lower extremity edema.  GASTROINTESTINAL: Abdomen appears unremarkable.  Nondistended   LYMPHATIC:  No cervical, supraclavicular lymphadenopathy.  SKIN:  Warm.  No rashes.  PSYCHIATRIC: Is drowsy.  Appears to be in distress  NEURO: AAOx3, no obvious focal deficits.      RECENT LABS:  Hematology WBC   Date Value Ref Range Status   07/07/2023 22.06 (H) 3.40 - 10.80 10*3/mm3 Final     RBC   Date Value Ref Range Status   07/07/2023 4.17 4.14 - 5.80 10*6/mm3 Final     Hemoglobin   Date Value Ref Range Status   07/07/2023 13.0 13.0 - 17.7 g/dL Final     Hematocrit   Date Value Ref Range Status   07/07/2023 37.7 37.5 - 51.0 % Final      Platelets   Date Value Ref Range Status   07/07/2023 190 140 - 450 10*3/mm3 Final              Assessment & Plan   Mr. Monahan is a pleasant 73-year-old male with medical history significant for CAD s/p CABG, interstitial lung disease, CKD-3, hypertension and dyslipidemia admitted with suspected metastatic disease.       #Suspected metastatic disease,  ?  Lung primary:   Patient had presented to [facility] ER on 7/5/2023 with worsening back pain and difficulty walking after L1 kyphoplasty procedure on 6/27/2023.  A bone biopsy performed during the procedure was negative for malignancy.   A MRI thoracic and lumbar spine performed on 7/5/2023 noted findings concerning for metastatic disease involving the thoracic and lumbar spines.  There is epidural tumor present in the lower thoracic region extending to L1 where it is most severe.  Due to tumor also extends into neural foramina at L1-L2 and to a lesser extent T12-L1.  All these are new since last imaging in early May 2023.  A CT C/A/P demonstrated marked interval increase in homogeneous soft tissue seen within the left perihilar and posterior left lower lobes.  The soft tissue adjacent to the descending thoracic aorta measures approximately 4.4 x 2.6 cm.  Bulky mediastinal adenopathy.  Index precarinal lymph node measures up to 1.7 cm in short axis dimension.   Of note, patient follows up with  pulmonology for interstitial lung disease.  He had an gone a bronchoscopy and biopsy of the left hilar mass on 5/31/2023.  The pathology was nondiagnostic for malignancy.  7/5/2023: Tumor markers show mildly elevated CA 19-9 (79.2) and normal CEA and PSA levels.  7/6/2023: Informed patient's wife that the imaging findings are highly concerning for metastatic disease, suspect lung primary.  Pulmonology has been consulted.  As prior bronchoscopic left hilar lung mass biopsy was nondiagnostic, will consult IR for possible L1 soft tissue lesion  biopsy.  I'm told that IR plans to perform this procedure on Monday next week after holding ASA & Plavix.  7/7/2023: Patient remains in pain.  Spoke with his sister (retired RN) at bedside who state patient would not want aggressive measures.  She thinks patient would benefit from comfort care measures only.  She states that patient's wife (next of kin) has early signs of Alzheimer's dementia and is not able to make decisions for the patient.  Patient is currently groggy and unable to maintain a conversation.  However, briefly, he reports of at least getting the biopsy done.  Will get palliative care on board to facilitate goals of care.  Will obtain MRI brain as well.     #L1 compression fracture s/p kyphoplasty: Continue pain control per primary     #Leukocytosis:   WBC up to 32.39 on admission on 7/5/2023.  Had normal WBC count prior to Plasty procedure.  Is afebrile.  Cultures performed.  This is likely reactive.     #CAD's s/p CABG: Plan to hold Plavix     #Interstitial lung disease: Follows up with pulmonology as outpatient     #CKD-3: Hypertensive nephropathy.  Follows up with nephrology as outpatient     #Pancreatic cystic mass:   Follows up with GI at [facility].  Mildly elevated CA 19-9 (79.2) likely related to this.     Plan:  -Tentative plan for CT-guided L1 soft tissue lesion biopsy on Monday.   -Hold ASA & Plavix until Monday for the biopsy  -Consult palliative care to facilitate goals of care discussions  -Pain control  -Will continue to follow

## 2023-07-07 NOTE — PLAN OF CARE
Goal Outcome Evaluation:         Patient is alert x 4.  Patient complains of pain 10/10 in lower back that radiates to BLE.  Medication given per order. Patient is refuses scd's and turns.  Call light in reach.  Family at bedside.

## 2023-07-08 NOTE — PROGRESS NOTES
Subjective     HISTORY OF PRESENT ILLNESS:   No acute issues overnight.  Patient lying in bed, still complains of significant amount of pain in his back.  Wife & daughter.  Afebrile.  Vital stable.    Past Medical History, Past Surgical History, Social History, Family History have been reviewed and are without significant changes except as mentioned.    Review of Systems   A comprehensive 14 point review of systems was performed and was negative except as mentioned.    Medications:  The current medication list was reviewed in the EMR    ALLERGIES:    Allergies   Allergen Reactions    Atorvastatin Swelling, Hallucinations and Myalgia       Joint pain       Objective      Vitals:    07/07/23 1645 07/07/23 2030 07/07/23 2300 07/08/23 0415   BP: 124/82 124/85 148/84 143/85   BP Location: Right arm Right arm Right arm Right arm   Patient Position: Lying Lying Lying Lying   Pulse: 81 105 97 99   Resp: 20 20 22 20   Temp: 98 °F (36.7 °C) 98.4 °F (36.9 °C) 97.9 °F (36.6 °C) 98.3 °F (36.8 °C)   TempSrc: Oral Oral Oral Oral   SpO2: 90% (!) 84% 95% 97%   Weight:       Height:              No data to display                  Physical Exam    CONSTITUTIONAL:  Vital signs reviewed.    EYES:  Conjunctiva and lids unremarkable.    EARS,NOSE,MOUTH,THROAT:  Ears and nose appear unremarkable.    RESPIRATORY:  Normal respiratory effort.    CARDIOVASCULAR:  Normal heart rate.  No significant lower extremity edema.  GASTROINTESTINAL: Abdomen appears unremarkable.  Nondistended   LYMPHATIC:  No cervical, supraclavicular lymphadenopathy.  SKIN:  Warm.  No rashes.  PSYCHIATRIC: Is drowsy.    NEURO: AAOx3, no obvious focal deficits.      RECENT LABS:  Hematology WBC   Date Value Ref Range Status   07/08/2023 21.87 (H) 3.40 - 10.80 10*3/mm3 Final     RBC   Date Value Ref Range Status   07/08/2023 3.67 (L) 4.14 - 5.80 10*6/mm3 Final     Hemoglobin   Date Value Ref Range Status   07/08/2023 11.3 (L) 13.0 - 17.7 g/dL Final     Hematocrit    Date Value Ref Range Status   07/08/2023 32.7 (L) 37.5 - 51.0 % Final     Platelets   Date Value Ref Range Status   07/08/2023 197 140 - 450 10*3/mm3 Final              Assessment & Plan   Mr. Monahan is a pleasant 73-year-old male with medical history significant for CAD s/p CABG, interstitial lung disease, CKD-3, hypertension and dyslipidemia admitted with suspected metastatic disease.       #Suspected metastatic disease,  ?  Lung primary:   Patient had presented to [facility] ER on 7/5/2023 with worsening back pain and difficulty walking after L1 kyphoplasty procedure on 6/27/2023.  A bone biopsy performed during the procedure was negative for malignancy.   A MRI thoracic and lumbar spine performed on 7/5/2023 noted findings concerning for metastatic disease involving the thoracic and lumbar spines.  There is epidural tumor present in the lower thoracic region extending to L1 where it is most severe.  Due to tumor also extends into neural foramina at L1-L2 and to a lesser extent T12-L1.  All these are new since last imaging in early May 2023.  A CT C/A/P demonstrated marked interval increase in homogeneous soft tissue seen within the left perihilar and posterior left lower lobes.  The soft tissue adjacent to the descending thoracic aorta measures approximately 4.4 x 2.6 cm.  Bulky mediastinal adenopathy.  Index precarinal lymph node measures up to 1.7 cm in short axis dimension.   Of note, patient follows up with  pulmonology for interstitial lung disease.  He had an gone a bronchoscopy and biopsy of the left hilar mass on 5/31/2023.  The pathology was nondiagnostic for malignancy.  7/5/2023: Tumor markers show mildly elevated CA 19-9 (79.2) and normal CEA and PSA levels.  7/6/2023: Informed patient's wife that the imaging findings are highly concerning for metastatic disease, suspect lung primary.  Pulmonology has been consulted.  As prior bronchoscopic left hilar lung mass biopsy was  nondiagnostic, will consult IR for possible L1 soft tissue lesion biopsy.  I'm told that IR plans to perform this procedure on Monday next week after holding ASA & Plavix.  7/7/2023: Patient remains in pain.  Spoke with his sister (retired RN) at bedside who state patient would not want aggressive measures.  She thinks patient would benefit from comfort care measures only.  She states that patient's wife (next of kin) has early signs of Alzheimer's dementia and is not able to make decisions for the patient.  Patient is currently groggy and unable to maintain a conversation.  However, briefly, he reports of at least getting the biopsy done.  Will get palliative care on board to facilitate goals of care.  Will obtain MRI brain as well.  7/8/2023: Continues to be in pain.  Plan to start Dilaudid PCA pump noted.  Again spoke with patient, his wife and daughter regarding ongoing illness.  Patient today categorically states that he wants the biopsy done.  After the biopsy report, he will make a decision whether he wants to receive treatment for that condition or not.  Continue to hold aspirin and Plavix.  Tentative plan for L1 soft tissue lesion biopsy on Monday by IR.     #L1 compression fracture s/p kyphoplasty: Continue pain control per primary     #Leukocytosis:   WBC up to 32.39 on admission on 7/5/2023.  Had normal WBC count prior to Plasty procedure.  Is afebrile.  Cultures performed.  This is likely reactive.     #CAD's s/p CABG: Plan to hold Plavix     #Interstitial lung disease: Follows up with pulmonology as outpatient     #CKD-3: Hypertensive nephropathy.  Follows up with nephrology as outpatient     #Pancreatic cyst/duct dilatation:   Follows up with GI at [facility].  Mildly elevated CA 19-9 (79.2) likely related to this.     Plan:  -Tentative plan for CT-guided L1 soft tissue lesion biopsy on Monday.   -Hold ASA & Plavix until Monday for the biopsy  -Plan to start Dilaudid PCA pump for pain  control  -Will continue to follow

## 2023-07-08 NOTE — PROGRESS NOTES
Name: Bob Monahan ADMIT: 2023   : 1950  PCP: Tiarra Yi PA-C    MRN: [id number] LOS: 2 days   AGE/SEX: 73 y.o. male  ROOM: [facility]     Subjective   Subjective   Patient continues to be significant uncomfortable even after medication change yesterday.  Wincing in bed and every once in a while will cry out in pain.  Sister at bedside.  Review of Systems  Back pain  No fevers, chills, chest pain, dyspnea, cough, abdominal pain, nausea, vomiting, dysuria.     Objective   Objective   Vital Signs  Temp:  [97.9 °F (36.6 °C)-98.4 °F (36.9 °C)] 98.4 °F (36.9 °C)  Heart Rate:  [] 96  Resp:  [18-22] 18  BP: (117-163)/(74-88) 121/83  SpO2:  [84 %-97 %] 96 %  on  Flow (L/min):  [1-2] 1;   Device (Oxygen Therapy): nasal cannula  Body mass index is 24.89 kg/m².  Physical Exam  Vitals and nursing note reviewed.   Constitutional:       General: He is not in acute distress.     Appearance: He is ill-appearing (Chronically).   Cardiovascular:      Rate and Rhythm: Regular rhythm. Tachycardia present.   Pulmonary:      Effort: Pulmonary effort is normal. No respiratory distress.   Abdominal:      General: Abdomen is flat. There is no distension.      Tenderness: There is no abdominal tenderness.   Musculoskeletal:         General: No swelling or deformity.      Comments: Lumbar tenderness   Skin:     General: Skin is warm and dry.      Coloration: Skin is not jaundiced.   Neurological:      General: No focal deficit present.      Mental Status: He is alert. Mental status is at baseline.       Results Review     I reviewed the patient's new clinical results.  Results from last 7 days   Lab Units 23  0723  0735 2344 23  1202   WBC 10*3/mm3 21.87* 22.06* 26.85* 32.39*   HEMOGLOBIN g/dL 11.3* 13.0 13.5 15.2   PLATELETS 10*3/mm3 197 190 207 241       Results from last 7 days   Lab Units 23  0720 23  0735 23  0644 23  1202   SODIUM mmol/L 139 141 137 137    POTASSIUM mmol/L 4.1 4.5 4.3 4.2   CHLORIDE mmol/L 108* 109* 109* 101   CO2 mmol/L 18.8* 15.0* 17.0* 17.4*   BUN mg/dL 39* 40* 43* 42*   CREATININE mg/dL 1.07 1.13 1.19 1.40*   GLUCOSE mg/dL 128* 106* 121* 185*     Estimated Creatinine Clearance: 58.1 mL/min (by C-G formula based on SCr of 1.07 mg/dL).  Results from last 7 days   Lab Units 07/06/23  1035 07/06/23  0644 07/05/23  1202   ALBUMIN g/dL 2.4* 2.7* 3.6   BILIRUBIN mg/dL  --  0.4 0.4   ALK PHOS U/L  --  133* 145*   AST (SGOT) U/L  --  10 13   ALT (SGPT) U/L  --  14 19       Results from last 7 days   Lab Units 07/08/23  0720 07/07/23  0735 07/06/23  1035 07/06/23  0644 07/05/23  1202   CALCIUM mg/dL 9.6 9.6  --  9.4 10.2   ALBUMIN g/dL  --   --  2.4* 2.7* 3.6   MAGNESIUM mg/dL  --   --   --  2.3  --    PHOSPHORUS mg/dL  --   --   --  3.8  --        Results from last 7 days   Lab Units 07/05/23  1401 07/05/23  1202   PROCALCITONIN ng/mL  --  0.39*   LACTATE mmol/L 2.0  --      No results found for: COVID19  Glucose   Date/Time Value Ref Range Status   07/07/2023 1135 160 (H) 70 - 130 mg/dL Final     Comment:     Meter: [id number] : [id number] Agus Lozano VICTOR HUGO       CT Abdomen Pelvis With Contrast, CT Chest With Contrast Diagnostic  Narrative: CT CHEST, ABDOMEN AND PELVIS WITH CONTRAST     HISTORY: 73-year-old male with evaluation for metastatic disease.     TECHNIQUE: Axial CT images of the chest abdomen and pelvis were obtained  following administration of intravenous and oral contrast. Coronal and  sagittal reconstructions were then obtained.     COMPARISON: CT chest dated 05/05/2023     FINDINGS:     CT CHEST: No pathological axillary lymphadenopathy. Changes of median  sternotomy with aortic valve replacement are present. Calcified coronary  artery disease is present.     There is bulky mediastinal lymphadenopathy. Index precarinal lymph node  measures up to 1.7 cm in short axis dimension. Prevascular lymph node  measures up to 1.6 cm in short axis  dimension. Subcarinal  lymphadenopathy measuring up to 2.3 cm in short axis dimension. In  addition there is marked bilateral hilar lymphadenopathy. Marked  interval increase in homogeneous soft tissue seen within the left  perihilar and posterior left lower lobe. This soft tissue adjacent to  the descending thoracic aorta measures approximately 4.4 x 2.6 cm. This  is highly concerning for a primary bronchogenic neoplasm. In addition  there are numerous scattered pulmonary nodules bilaterally in a pattern  consistent with metastatic disease. Dilated ascending thoracic aorta  measuring up to 3.8 cm. The patient's demonstrated thoracic metastases  is subtle on CT.     CT ABDOMEN AND PELVIS:Numerous hypoattenuating foci within both lobes of  the liver highly suspicious for metastatic disease. The spleen is  somewhat bulky. The gallbladder is distended and otherwise normal. There  is marked dilatation of the pancreatic duct within the body and tail of  the pancreas to the level of the large calcification within the head  region, mildly thickened appearance of the left adrenal gland is  unchanged. No renal calculi or hydronephrosis. Urinary bladder is  moderately distended and normal. Diverticulosis is present. No evidence  of bowel obstruction. An enlarged retrocrural lymph node measures up to  1.6 cm in short axis dimension. There is marked atherosclerotic  calcification within the abdominal aorta and its branches. Status post  kyphoplasty at L1 level where there is paravertebral soft tissue. This  has been better evaluated on the MR of the spine.     Impression: 1. A primary bronchogenic neoplasm is suspected within the left lower  lobe. Metastatic mediastinal, bilateral hilar and retrocrural  lymphadenopathy is identified. Additional bilateral pulmonary nodules  and hepatic metastatic disease is identified. The thoracic vertebral  body metastases are subtle on CT imaging. Percutaneous biopsy may be  performed from  the liver lesions.  2. Marked dilatation of the pancreatic duct within the body and tail of  the pancreas leading up to calcification within the neck/head region.  This may represent an obstructing pancreatic duct calculus. Further  workup recommended.  3. Dilated ascending thoracic aorta.     Radiation dose reduction techniques were utilized, including automated  exposure control and exposure modulation based on body size.     This report was finalized on 7/7/2023 11:33 AM by Dr. Vikki Ring M.D.       I reviewed the patient's daily medications.  Scheduled Medications  lidocaine, 2 patch, Transdermal, Q24H  mesalamine, 1.2 g, Oral, Daily With Breakfast  methocarbamol, 750 mg, Oral, Q6H  metoprolol tartrate, 50 mg, Oral, Q8H  pancrelipase (Lip-Prot-Amyl), 12,000 units of lipase, Oral, TID With Meals  pantoprazole, 40 mg, Oral, Q AM  rosuvastatin, 40 mg, Oral, Daily  senna-docusate sodium, 2 tablet, Oral, BID  sodium bicarbonate, 650 mg, Oral, TID  sodium chloride, 10 mL, Intravenous, Q12H    Infusions  HYDROmorphone HCl-NaCl,   sodium chloride, 30 mL/hr    Diet  Diet: Regular/House Diet; Texture: Regular Texture (IDDSI 7); Fluid Consistency: Thin (IDDSI 0)         I have personally reviewed:  [x]  Laboratory   [x]  Microbiology   [x]  Radiology   [x]  EKG/Telemetry   []  Cardiology/Vascular   []  Pathology   []  Records     Assessment/Plan     Active Hospital Problems    Diagnosis  POA    **Postoperative back pain [G89.18, M54.9]  Yes    Status post kyphoplasty [Z98.890]  Not Applicable    Tachycardia [R00.0]  Yes    History of esophageal dysphagia [Z87.898]  Yes    Leukocytosis [D72.829]  Yes    History of recent steroid use [Z92.241]  Not Applicable    Difficulty walking [R26.2]  Yes    Constipated [K59.00]  Yes    Compression fracture of L1 vertebra [S32.010A]  Yes    Impaired fasting glucose [R73.01]  Yes    History of aortic valve replacement [Z95.2]  Not Applicable    S/P CABG (coronary artery bypass  graft) [Z95.1]  Not Applicable    Chronic renal insufficiency, stage III (moderate) [N18.30]  Yes    Benign essential hypertension [I10]  Yes      Resolved Hospital Problems   No resolved problems to display.       73 y.o. male admitted with Postoperative back pain.    Metastatic cancer  Primary cancer unknown but suspected to be lung  Intractable back pain  -MRI lumbar spine showing thoracic spine and lumbar spine metastases with epidural tumor  -CT chest/abdomen/pelvis with concern for primary bronchogenic neoplasm in the left lower lobe.  Additional bilateral pulmonary nodules and hepatic metastatic disease.  -On chart review patient had been following up here with lung nodule and had a bronc/EBUS in May with no malignant cells on pathology  -Holding aspirin Plavix so that he can have a biopsy done on Monday by IR  -Discussed with pharmacy, plan to start PCA pump today.  Discontinue scheduled oxycodone, will discontinue IV Dilaudid when PCA pump has been set up.    Marked dilatation of the pancreatic duct  Chronic pancreatic ductal calculus  -Reviewed [place] records pancreatic ductal calculus is chronic  -CA 19-9 is actually decreased from previous been high as 150 at [place] in the past    Marked leukocytosis, stable  -Continues to downtrend without any antibiotics, procalcitonin 0.39  -Likely secondary to recent steroids as well as malignancy  -Continue to monitor off antibiotics  -Blood cultures with no growth    Coronary artery disease status post CABG  Hypertension  PVD  -Hold home Plavix and aspirin  -Continue home metoprolol, statin    Colitis-continue home mesalamine     ILD    CKD 3B, stable, baseline  Metabolic acidosis,improving  -continuesodium bicarb    Goals of care-discussed CODE STATUS with patient and sister at bedside.  Reviewed living well.  CODE STATUS has been changed to DNR/DNI.  At this time patient wishes to have biopsy on Monday to get him for more information and make further decisions  based on this.  Palliative care is also been consulted to help with goals of care.    SCDs for DVT prophylaxis.  Full code.  Discussed with family and nursing staff.  Anticipate discharge home with family next week.    Expected Discharge Date: 7/10/2023; Expected Discharge Time:       Manny Santo MD  [facility]  07/08/23  10:53 EDT

## 2023-07-08 NOTE — PLAN OF CARE
Problem: Adult Inpatient Plan of Care  Goal: Absence of Hospital-Acquired Illness or Injury  Intervention: Prevent Skin Injury  Recent Flowsheet Documentation  Taken 7/8/2023 0400 by Paris Guerrero RN  Body Position:   supine   weight shifting  Taken 7/8/2023 0200 by Paris Guerrero RN  Body Position: supine  Taken 7/8/2023 0000 by Paris Guerrero RN  Body Position: supine  Taken 7/7/2023 2200 by Paris Guerrero RN  Body Position: supine  Taken 7/7/2023 2000 by Paris Guerrero RN  Body Position: supine  Skin Protection: adhesive use limited     Problem: Adult Inpatient Plan of Care  Goal: Absence of Hospital-Acquired Illness or Injury  Intervention: Prevent and Manage VTE (Venous Thromboembolism) Risk  Recent Flowsheet Documentation  Taken 7/7/2023 2000 by Paris Guerrero RN  VTE Prevention/Management:   bilateral   sequential compression devices on     Problem: Adult Inpatient Plan of Care  Goal: Absence of Hospital-Acquired Illness or Injury  Intervention: Prevent Infection  Recent Flowsheet Documentation  Taken 7/8/2023 0400 by Paris Guerrero RN  Infection Prevention: single patient room provided  Taken 7/7/2023 2000 by Paris Guerrero RN  Infection Prevention: single patient room provided   Goal Outcome Evaluation:

## 2023-07-09 NOTE — PLAN OF CARE
Goal Outcome Evaluation:  Plan of Care Reviewed With: patient, daughter           Outcome Evaluation: Pt continued to have severe muscles spasms throughout the night. Robaxin q 6hrs given. Pt was hitting PCA pump over 200 times, however it appeared his use of the PCA was ineffective. He was getting about a half of allowed quantity because he was hitting it only when he was getting his spasms. Pt and family reeducated on PCA pump use several times, verbalized understanding. PATRICK Bravo notified. 1 extra dose of dilaudid IV given. Continue to monitor.

## 2023-07-09 NOTE — PROGRESS NOTES
Name: Bob Monahan ADMIT: 2023   : 1950  PCP: Tiarra Yi, DONAVON    MRN: [id number] LOS: 3 days   AGE/SEX: 73 y.o. male  ROOM: [facility]     Subjective   Subjective   Patient did not get much sleep last night because of his continued back pain.  Does describe it as shooting pain down his bilateral legs.  And having significant muscle spasms.  Robaxin does work, but not lasting the full 6 hours.    Review of Systems  Back pain  No fevers, chills, chest pain, dyspnea, cough, abdominal pain, nausea, vomiting, dysuria.     Objective   Objective   Vital Signs  Temp:  [97.5 °F (36.4 °C)-98.4 °F (36.9 °C)] 97.5 °F (36.4 °C)  Heart Rate:  [] 97  Resp:  [16-18] 18  BP: ()/(63-83) 93/66  SpO2:  [93 %-97 %] 94 %  on  Flow (L/min):  [1] 1;   Device (Oxygen Therapy): nasal cannula  Body mass index is 24.89 kg/m².  Physical Exam  Vitals and nursing note reviewed.   Constitutional:       General: He is not in acute distress.     Appearance: He is ill-appearing (Chronically).   Cardiovascular:      Rate and Rhythm: Regular rhythm. Tachycardia present.   Pulmonary:      Effort: Pulmonary effort is normal. No respiratory distress.   Abdominal:      General: Abdomen is flat. There is no distension.      Tenderness: There is no abdominal tenderness.   Musculoskeletal:         General: No swelling or deformity.      Comments: Lumbar tenderness   Skin:     General: Skin is warm and dry.      Coloration: Skin is not jaundiced.   Neurological:      General: No focal deficit present.      Mental Status: He is alert. Mental status is at baseline.       Results Review     I reviewed the patient's new clinical results.  Results from last 7 days   Lab Units 23  0720 23  0735 23  0644 23  1202   WBC 10*3/mm3 21.87* 22.06* 26.85* 32.39*   HEMOGLOBIN g/dL 11.3* 13.0 13.5 15.2   PLATELETS 10*3/mm3 197 190 207 241       Results from last 7 days   Lab Units 23  0641 23  0720  07/07/23  0735 07/06/23  0644   SODIUM mmol/L 138 139 141 137   POTASSIUM mmol/L 4.0 4.1 4.5 4.3   CHLORIDE mmol/L 106 108* 109* 109*   CO2 mmol/L 19.0* 18.8* 15.0* 17.0*   BUN mg/dL 37* 39* 40* 43*   CREATININE mg/dL 1.17 1.07 1.13 1.19   GLUCOSE mg/dL 113* 128* 106* 121*     Estimated Creatinine Clearance: 53.1 mL/min (by C-G formula based on SCr of 1.17 mg/dL).  Results from last 7 days   Lab Units 07/06/23  1035 07/06/23  0644 07/05/23  1202   ALBUMIN g/dL 2.4* 2.7* 3.6   BILIRUBIN mg/dL  --  0.4 0.4   ALK PHOS U/L  --  133* 145*   AST (SGOT) U/L  --  10 13   ALT (SGPT) U/L  --  14 19       Results from last 7 days   Lab Units 07/09/23  0641 07/08/23  0720 07/07/23  0735 07/06/23  1035 07/06/23  0644 07/05/23  1202   CALCIUM mg/dL 9.7 9.6 9.6  --  9.4 10.2   ALBUMIN g/dL  --   --   --  2.4* 2.7* 3.6   MAGNESIUM mg/dL  --   --   --   --  2.3  --    PHOSPHORUS mg/dL  --   --   --   --  3.8  --        Results from last 7 days   Lab Units 07/05/23  1401 07/05/23  1202   PROCALCITONIN ng/mL  --  0.39*   LACTATE mmol/L 2.0  --      No results found for: COVID19  Glucose   Date/Time Value Ref Range Status   07/07/2023 1135 160 (H) 70 - 130 mg/dL Final     Comment:     Meter: [id number] : [id number] Namankamille Lozano VICTOR HUGO       CT Abdomen Pelvis With Contrast, CT Chest With Contrast Diagnostic  Narrative: CT CHEST, ABDOMEN AND PELVIS WITH CONTRAST     HISTORY: 73-year-old male with evaluation for metastatic disease.     TECHNIQUE: Axial CT images of the chest abdomen and pelvis were obtained  following administration of intravenous and oral contrast. Coronal and  sagittal reconstructions were then obtained.     COMPARISON: CT chest dated 05/05/2023     FINDINGS:     CT CHEST: No pathological axillary lymphadenopathy. Changes of median  sternotomy with aortic valve replacement are present. Calcified coronary  artery disease is present.     There is bulky mediastinal lymphadenopathy. Index precarinal lymph node  measures up  to 1.7 cm in short axis dimension. Prevascular lymph node  measures up to 1.6 cm in short axis dimension. Subcarinal  lymphadenopathy measuring up to 2.3 cm in short axis dimension. In  addition there is marked bilateral hilar lymphadenopathy. Marked  interval increase in homogeneous soft tissue seen within the left  perihilar and posterior left lower lobe. This soft tissue adjacent to  the descending thoracic aorta measures approximately 4.4 x 2.6 cm. This  is highly concerning for a primary bronchogenic neoplasm. In addition  there are numerous scattered pulmonary nodules bilaterally in a pattern  consistent with metastatic disease. Dilated ascending thoracic aorta  measuring up to 3.8 cm. The patient's demonstrated thoracic metastases  is subtle on CT.     CT ABDOMEN AND PELVIS:Numerous hypoattenuating foci within both lobes of  the liver highly suspicious for metastatic disease. The spleen is  somewhat bulky. The gallbladder is distended and otherwise normal. There  is marked dilatation of the pancreatic duct within the body and tail of  the pancreas to the level of the large calcification within the head  region, mildly thickened appearance of the left adrenal gland is  unchanged. No renal calculi or hydronephrosis. Urinary bladder is  moderately distended and normal. Diverticulosis is present. No evidence  of bowel obstruction. An enlarged retrocrural lymph node measures up to  1.6 cm in short axis dimension. There is marked atherosclerotic  calcification within the abdominal aorta and its branches. Status post  kyphoplasty at L1 level where there is paravertebral soft tissue. This  has been better evaluated on the MR of the spine.     Impression: 1. A primary bronchogenic neoplasm is suspected within the left lower  lobe. Metastatic mediastinal, bilateral hilar and retrocrural  lymphadenopathy is identified. Additional bilateral pulmonary nodules  and hepatic metastatic disease is identified. The thoracic  vertebral  body metastases are subtle on CT imaging. Percutaneous biopsy may be  performed from the liver lesions.  2. Marked dilatation of the pancreatic duct within the body and tail of  the pancreas leading up to calcification within the neck/head region.  This may represent an obstructing pancreatic duct calculus. Further  workup recommended.  3. Dilated ascending thoracic aorta.     Radiation dose reduction techniques were utilized, including automated  exposure control and exposure modulation based on body size.     This report was finalized on 7/7/2023 11:33 AM by Dr. Vikki Ring M.D.       I reviewed the patient's daily medications.  Scheduled Medications  lidocaine, 2 patch, Transdermal, Q24H  mesalamine, 1.2 g, Oral, Daily With Breakfast  methocarbamol, 1,000 mg, Oral, Q6H  metoprolol tartrate, 50 mg, Oral, Q8H  pancrelipase (Lip-Prot-Amyl), 12,000 units of lipase, Oral, TID With Meals  pantoprazole, 40 mg, Oral, Q AM  pregabalin, 25 mg, Oral, Q12H  rosuvastatin, 40 mg, Oral, Daily  senna-docusate sodium, 2 tablet, Oral, BID  sodium bicarbonate, 650 mg, Oral, TID  sodium chloride, 10 mL, Intravenous, Q12H  traZODone, 50 mg, Oral, Nightly    Infusions  HYDROmorphone HCl-NaCl,   sodium chloride, 30 mL/hr    Diet  Diet: Regular/House Diet; Texture: Regular Texture (IDDSI 7); Fluid Consistency: Thin (IDDSI 0)         I have personally reviewed:  [x]  Laboratory   [x]  Microbiology   [x]  Radiology   [x]  EKG/Telemetry   []  Cardiology/Vascular   []  Pathology   []  Records     Assessment/Plan     Active Hospital Problems    Diagnosis  POA    **Postoperative back pain [G89.18, M54.9]  Yes    Status post kyphoplasty [Z98.890]  Not Applicable    Tachycardia [R00.0]  Yes    History of esophageal dysphagia [Z87.898]  Yes    Leukocytosis [D72.829]  Yes    History of recent steroid use [Z92.241]  Not Applicable    Difficulty walking [R26.2]  Yes    Constipated [K59.00]  Yes    Compression fracture of L1 vertebra  [S32.010A]  Yes    Impaired fasting glucose [R73.01]  Yes    History of aortic valve replacement [Z95.2]  Not Applicable    S/P CABG (coronary artery bypass graft) [Z95.1]  Not Applicable    Chronic renal insufficiency, stage III (moderate) [N18.30]  Yes    Benign essential hypertension [I10]  Yes      Resolved Hospital Problems   No resolved problems to display.       73 y.o. male admitted with Postoperative back pain.    Metastatic cancer  Primary cancer unknown but suspected to be lung  Intractable back pain  -MRI lumbar spine showing thoracic spine and lumbar spine metastases with epidural tumor  -CT chest/abdomen/pelvis with concern for primary bronchogenic neoplasm in the left lower lobe.  Additional bilateral pulmonary nodules and hepatic metastatic disease.  -On chart review patient had been following up here with lung nodule and had a bronc/EBUS in May with no malignant cells on pathology  -Holding aspirin Plavix so that he can have a biopsy done on Monday by IR  -Discussed with pharmacy, continue PCA and plan to increase frequency of demands to acute 10.  -Adding Lyrica for neuropathic pain      Marked dilatation of the pancreatic duct  Chronic pancreatic ductal calculus  -Reviewed [place] records pancreatic ductal calculus is chronic  -CA 19-9 is actually decreased from previous been high as 150 at [place] in the past    Marked leukocytosis, stable  -Continues to downtrend without any antibiotics, procalcitonin 0.39  -Likely secondary to recent steroids as well as malignancy  -Continue to monitor off antibiotics  -Blood cultures with no growth    Coronary artery disease status post CABG  Hypertension  PVD  -Hold home Plavix and aspirin  -Continue home metoprolol, statin    Colitis-continue home mesalamine     ILD    CKD 3B, stable, baseline  Metabolic acidosis,improving  -continuesodium bicarb    Goals of care-discussed CODE STATUS with patient and sister at bedside on 7/9.  Reviewed living well.  CODE  STATUS has been changed to DNR/DNI.  At this time patient wishes to have biopsy on Monday to get him for more information and make further decisions based on this.  Palliative care is also been consulted to help with goals of care.    SCDs for DVT prophylaxis.  Full code.  Discussed with family and nursing staff.  Anticipate discharge home with family next week.    Expected Discharge Date: 7/10/2023; Expected Discharge Time:       Manny Santo MD  [facility]  07/09/23  09:44 EDT      Addendum: Attempted to call patient's son-in-law, Tashi, no answer. Will try again tomorrow.

## 2023-07-09 NOTE — PROGRESS NOTES
Subjective     HISTORY OF PRESENT ILLNESS:   No acute issues overnight.  Patient lying in bed, still complains of significant amount of pain in his back.  Sister-in-law at bedside.  Afebrile.  Vital stable.    Past Medical History, Past Surgical History, social History, Family History have been reviewed and are without significant changes except as mentioned.    Review of Systems   A comprehensive 14 point review of systems was performed and was negative except as mentioned.    Medications:  The current medication list was reviewed in the EMR    ALLERGIES:    Allergies   Allergen Reactions    Atorvastatin Swelling, Hallucinations and Myalgia       Joint pain       Objective      Vitals:    07/08/23 2300 07/09/23 0206 07/09/23 0320 07/09/23 0728   BP: 94/72  97/63 93/66   BP Location: Left arm  Left arm Left arm   Patient Position: Lying  Lying Lying   Pulse: 102 98 87 97   Resp: 18 16 18 18   Temp: 98.1 °F (36.7 °C)  98 °F (36.7 °C) 97.5 °F (36.4 °C)   TempSrc: Oral  Oral Oral   SpO2: 94% 94% 97% 94%   Weight:       Height:              No data to display                  Physical Exam    CONSTITUTIONAL:  Vital signs reviewed.    EYES:  Conjunctiva and lids unremarkable.    EARS,NOSE,MOUTH,THROAT:  Ears and nose appear unremarkable.    RESPIRATORY:  Normal respiratory effort.    CARDIOVASCULAR:  Normal heart rate.  No significant lower extremity edema.  GASTROINTESTINAL: Abdomen appears unremarkable.  Nondistended   LYMPHATIC:  No cervical, supraclavicular lymphadenopathy.  SKIN:  Warm.  No rashes.  PSYCHIATRIC: Is drowsy.    NEURO: AAOx3, no obvious focal deficits.      RECENT LABS:  Hematology WBC   Date Value Ref Range Status   07/08/2023 21.87 (H) 3.40 - 10.80 10*3/mm3 Final     RBC   Date Value Ref Range Status   07/08/2023 3.67 (L) 4.14 - 5.80 10*6/mm3 Final     Hemoglobin   Date Value Ref Range Status   07/08/2023 11.3 (L) 13.0 - 17.7 g/dL Final     Hematocrit   Date Value Ref Range Status   07/08/2023 32.7  (L) 37.5 - 51.0 % Final     Platelets   Date Value Ref Range Status   07/08/2023 197 140 - 450 10*3/mm3 Final              Assessment & Plan   Mr. Monahan is a pleasant 73-year-old male with medical history significant for CAD s/p CABG, interstitial lung disease, CKD-3, hypertension and dyslipidemia admitted with suspected metastatic disease.       #Suspected metastatic disease,  ?  Lung primary:   Patient had presented to [facility] ER on 7/5/2023 with worsening back pain and difficulty walking after L1 kyphoplasty procedure on 6/27/2023.  A bone biopsy performed during the procedure was negative for malignancy.   A MRI thoracic and lumbar spine performed on 7/5/2023 noted findings concerning for metastatic disease involving the thoracic and lumbar spines.  There is epidural tumor present in the lower thoracic region extending to L1 where it is most severe.  Due to tumor also extends into neural foramina at L1-L2 and to a lesser extent T12-L1.  All these are new since last imaging in early May 2023.  A CT C/A/P demonstrated marked interval increase in homogeneous soft tissue seen within the left perihilar and posterior left lower lobes.  The soft tissue adjacent to the descending thoracic aorta measures approximately 4.4 x 2.6 cm.  Bulky mediastinal adenopathy.  Index precarinal lymph node measures up to 1.7 cm in short axis dimension.   Of note, patient follows up with  pulmonology for interstitial lung disease.  He had an gone a bronchoscopy and biopsy of the left hilar mass on 5/31/2023.  The pathology was nondiagnostic for malignancy.  7/5/2023: Tumor markers show mildly elevated CA 19-9 (79.2) and normal CEA and PSA levels.  7/6/2023: Informed patient's wife that the imaging findings are highly concerning for metastatic disease, suspect lung primary.  Pulmonology has been consulted.  As prior bronchoscopic left hilar lung mass biopsy was nondiagnostic, will consult IR for possible L1  soft tissue lesion biopsy.  I'm told that IR plans to perform this procedure on Monday next week after holding ASA & Plavix.  7/7/2023: Patient remains in pain.  Spoke with his sister (retired RN) at bedside who state patient would not want aggressive measures.  She thinks patient would benefit from comfort care measures only.  She states that patient's wife (next of kin) has early signs of Alzheimer's dementia and is not able to make decisions for the patient.  Patient is currently groggy and unable to maintain a conversation.  However, briefly, he reports of at least getting the biopsy done.  Will get palliative care on board to facilitate goals of care.  Will obtain MRI brain as well.  7/8/2023: Continues to be in pain.  Plan to start Dilaudid PCA pump noted.  Again spoke with patient, his wife and daughter regarding ongoing illness.  Patient today categorically states that he wants the biopsy done.  After the biopsy report, he will make a decision whether he wants to receive treatment for that condition or not.  Continue to hold aspirin and Plavix.  Tentative plan for L1 soft tissue lesion biopsy on Monday by IR.  7/9/2023: Persistent pain.  Has been pushing the Dilaudid PCA pump multiple times.  Will consult acute pain management.  Tentative plan for L1 lesion soft tissue biopsy tomorrow.  After the biopsy report, he will make a decision whether he wants to receive treatment for that condition or not.  Continue to hold aspirin and Plavix.      #L1 compression fracture s/p kyphoplasty: Continue pain control per primary     #Leukocytosis:   WBC up to 32.39 on admission on 7/5/2023.  Had normal WBC count prior to Plasty procedure.  Is afebrile.  Cultures performed.  This is likely reactive.     #CAD's s/p CABG: Plan to hold Plavix     #Interstitial lung disease: Follows up with pulmonology as outpatient     #CKD-3: Hypertensive nephropathy.  Follows up with nephrology as outpatient     #Pancreatic cyst/duct  dilatation:   Follows up with GI at [facility].  Mildly elevated CA 19-9 (79.2) likely related to this.     Plan:  -Diffuse lung and bone lesions concerning for metastatic disease.  As prior bronchoscopic left hilar lung mass biopsy was nondiagnostic, IR was consulted for possible L1 soft tissue lesion vs other amenable site biopsy.   IR plans to perform this procedure on Monday next week after holding ASA & Plavix x 5 days.   -Hold ASA & Plavix until Monday for the biopsy  -Continue Dilaudid PCA pump for pain control  -Consult inpatient pain management team  -Will continue to follow

## 2023-07-09 NOTE — SIGNIFICANT NOTE
PT received new evaluation orders. Per RN pt is on bedrest pending biopsy tomorrow am at 11:30. Per RN pt back pain is not under control. Pt screams out in pain for log rolling. He has been refusing baths and turning. I did speak with pt and family regarding possible use of TENS for pain relief. PT shweta check on pt tomorrow.

## 2023-07-10 NOTE — H&P (VIEW-ONLY)
[facility] INPATIENT PROGRESS NOTE    Length of Stay:  4 days    CHIEF COMPLAINT/REASON FOR VISIT:  Metastatic cancer, suspected lung origin    SUBJECTIVE: Afebrile.  On 5 L nasal cannula which appears to be an increase.  Increasing right knee swelling but the patient denies a lot of pain at this time.  He is awake and alert.  He is oriented to place.  He knows his family members and friends in the room with him today.    ROS:  14 systems reviewed with pertinent positives and negatives in the HPI. Reviewed today.    OBJECTIVE:  Vitals:    07/10/23 0000 07/10/23 0057 07/10/23 0411 07/10/23 0724   BP: 131/83  123/76 126/77   BP Location: Right arm  Right arm Right arm   Patient Position: Lying  Lying Lying   Pulse:    118   Resp: 16 16 16 16   Temp: 97.4 °F (36.3 °C)  97.8 °F (36.6 °C) 98 °F (36.7 °C)   TempSrc: Oral  Oral Oral   SpO2:    99%   Weight:   64.4 kg (141 lb 15.6 oz)    Height:             PHYSICAL EXAMINATION:   General: No acute distress, lying in bed, oriented to place and he knows friends and family in the room with him  Chest/Lungs: Clear to auscultation anteriorly  Heart: Regular rate and rhythm  Abdomen/GI: Soft, nontender, nondistended, normal active bowel sounds  Extremities: Warm and well perfused.  There is a right knee effusion present.    DIAGNOSTIC DATA:  Results Review:     I reviewed the patient's new clinical results.    Results from last 7 days   Lab Units 07/08/23  0720   WBC 10*3/mm3 21.87*   HEMOGLOBIN g/dL 11.3*   HEMATOCRIT % 32.7*   PLATELETS 10*3/mm3 197     Lab Results   Component Value Date    NEUTROABS 16.58 (H) 07/08/2023     Results from last 7 days   Lab Units 07/09/23  0641   SODIUM mmol/L 138   POTASSIUM mmol/L 4.0   CHLORIDE mmol/L 106   CO2 mmol/L 19.0*   BUN mg/dL 37*   CREATININE mg/dL 1.17   GLUCOSE mg/dL 113*   CALCIUM mg/dL 9.7     Results from last 7 days   Lab Units 07/10/23  0833   INR  1.40*     Results from last 7 days   Lab Units 07/06/23  0644    MAGNESIUM mg/dL 2.3         IMAGING:    CT Chest With Contrast Diagnostic (07/06/2023 09:27)  CT Abdomen Pelvis With Contrast (07/06/2023 09:27)    CT images personally reviewed.  LLL lung mass with significant adenopathy and bilateral pulmonary nodules and liver metastases.     ASSESSMENT:  This is a 73 y.o. male with:     *Suspected metastatic disease, suspected lung primary:   Presented to [facility] ED on 7/5/2023 with worsening back pain and difficulty walking after L1 kyphoplasty procedure on 6/27/2023.  A bone biopsy performed during the procedure was negative for malignancy.   MRI thoracic and lumbar spine performed on 7/5/2023 noted findings concerning for metastatic disease involving the thoracic and lumbar spines.  There is epidural tumor present in the lower thoracic region extending to L1 where it is most severe.  Tumor also extends into neural foramina at L1-L2 and to a lesser extent T12-L1.  All these are new since last imaging in early May 2023.  CT C/A/P with marked interval increase in homogeneous soft tissue seen within the left perihilar and posterior left lower lobes.  The soft tissue adjacent to the descending thoracic aorta measures approximately 4.4 x 2.6 cm.  Bulky mediastinal adenopathy.  Index precarinal lymph node measures up to 1.7 cm in short axis dimension.   Of note, patient follows with  with pulmonology for interstitial lung disease.  Bronchoscopy and biopsy of the left hilar mass on 5/31/2023.  The pathology was nondiagnostic for malignancy.  7/5/2023: Tumor markers show mildly elevated CA 19-9 (79.2) and normal CEA and PSA levels.  7/6/2023: Informed patient's wife that the imaging findings are highly concerning for metastatic disease, suspect lung primary.  Pulmonology has been consulted.  As prior bronchoscopic left hilar lung mass biopsy was nondiagnostic, will consult IR for possible L1 soft tissue lesion biopsy. IR plans to perform this procedure on  Monday next week after holding ASA & Plavix.  7/7/2023: Patient remains in pain.  Spoke with his sister (retired RN) at bedside who state patient would not want aggressive measures.  She thinks patient would benefit from comfort care measures only.  She states that patient's wife (next of kin) has early signs of Alzheimer's dementia and is not able to make decisions for the patient.  Patient is currently groggy and unable to maintain a conversation.  However, briefly, he reports of at least getting the biopsy done.   Palliative care consulted  Plan MRI brain as well.  7/8/2023: Continues to be in pain.  Plan to start Dilaudid PCA pump noted.  Again spoke with patient, his wife and daughter regarding ongoing illness.  Patient today categorically states that he wants the biopsy done.  After the biopsy report, he will make a decision whether he wants to receive treatment for that condition or not.  Continue to hold aspirin and Plavix.  Tentative plan for L1 soft tissue lesion biopsy on Monday by IR.  7/9/2023: Persistent pain.  Has been pushing the Dilaudid PCA pump multiple times.  Will consult acute pain management.  Tentative plan for L1 lesion soft tissue biopsy tomorrow.  After the biopsy report, he will make a decision whether he wants to receive treatment for that condition or not.  Continue to hold aspirin and Plavix.   7/10/2023: I will confirm with radiology plans for a biopsy and make sure the appropriate orders were placed     *L1 compression fracture, s/p kyphoplasty: Continue pain control per primary     *Leukocytosis:   WBC 21, from 22, from 26. Likely reactive     *CADZ s/p CABG: Plan to hold Plavix     *Interstitial lung disease: Follows with Dr. Tidwell pulmonology as outpatient     *CKD3: Hypertensive nephropathy.  Follows with nephrology as outpatient     *Pancreatic cyst/duct dilatation:   Follows with GI at [place].  Mildly elevated CA 19-9 (79.2) likely related to this.    *Right knee  effusion  Orthopedics to evaluate    RECOMMENDATIONS:  Plan for a biopsy today.  I will confirm with interventional radiology and placed the appropriate order  Continue pain management  Plans for any systemic therapy depend on his performance status and results of pathology    All issues are new to me today.  Discussed with the patient's nurse.  Discussed with the patient and his family.    Discussed with Dr. Menchaca.  Likely plan for a lung biopsy but if there is something easier to biopsy when he reviews the CT in real-time he may decide to biopsy a different lesion.    Blake Mcgovern MD

## 2023-07-10 NOTE — PROGRESS NOTES
[facility] INPATIENT PROGRESS NOTE    Length of Stay:  4 days    CHIEF COMPLAINT/REASON FOR VISIT:  Metastatic cancer, suspected lung origin    SUBJECTIVE: Afebrile.  On 5 L nasal cannula which appears to be an increase.  Increasing right knee swelling but the patient denies a lot of pain at this time.  He is awake and alert.  He is oriented to place.  He knows his family members and friends in the room with him today.    ROS:  14 systems reviewed with pertinent positives and negatives in the HPI. Reviewed today.    OBJECTIVE:  Vitals:    07/10/23 0000 07/10/23 0057 07/10/23 0411 07/10/23 0724   BP: 131/83  123/76 126/77   BP Location: Right arm  Right arm Right arm   Patient Position: Lying  Lying Lying   Pulse:    118   Resp: 16 16 16 16   Temp: 97.4 °F (36.3 °C)  97.8 °F (36.6 °C) 98 °F (36.7 °C)   TempSrc: Oral  Oral Oral   SpO2:    99%   Weight:   64.4 kg (141 lb 15.6 oz)    Height:             PHYSICAL EXAMINATION:   General: No acute distress, lying in bed, oriented to place and he knows friends and family in the room with him  Chest/Lungs: Clear to auscultation anteriorly  Heart: Regular rate and rhythm  Abdomen/GI: Soft, nontender, nondistended, normal active bowel sounds  Extremities: Warm and well perfused.  There is a right knee effusion present.    DIAGNOSTIC DATA:  Results Review:     I reviewed the patient's new clinical results.    Results from last 7 days   Lab Units 07/08/23  0720   WBC 10*3/mm3 21.87*   HEMOGLOBIN g/dL 11.3*   HEMATOCRIT % 32.7*   PLATELETS 10*3/mm3 197     Lab Results   Component Value Date    NEUTROABS 16.58 (H) 07/08/2023     Results from last 7 days   Lab Units 07/09/23  0641   SODIUM mmol/L 138   POTASSIUM mmol/L 4.0   CHLORIDE mmol/L 106   CO2 mmol/L 19.0*   BUN mg/dL 37*   CREATININE mg/dL 1.17   GLUCOSE mg/dL 113*   CALCIUM mg/dL 9.7     Results from last 7 days   Lab Units 07/10/23  0833   INR  1.40*     Results from last 7 days   Lab Units 07/06/23  0644    MAGNESIUM mg/dL 2.3         IMAGING:    CT Chest With Contrast Diagnostic (07/06/2023 09:27)  CT Abdomen Pelvis With Contrast (07/06/2023 09:27)    CT images personally reviewed.  LLL lung mass with significant adenopathy and bilateral pulmonary nodules and liver metastases.     ASSESSMENT:  This is a 73 y.o. male with:     *Suspected metastatic disease, suspected lung primary:   Presented to [facility] ED on 7/5/2023 with worsening back pain and difficulty walking after L1 kyphoplasty procedure on 6/27/2023.  A bone biopsy performed during the procedure was negative for malignancy.   MRI thoracic and lumbar spine performed on 7/5/2023 noted findings concerning for metastatic disease involving the thoracic and lumbar spines.  There is epidural tumor present in the lower thoracic region extending to L1 where it is most severe.  Tumor also extends into neural foramina at L1-L2 and to a lesser extent T12-L1.  All these are new since last imaging in early May 2023.  CT C/A/P with marked interval increase in homogeneous soft tissue seen within the left perihilar and posterior left lower lobes.  The soft tissue adjacent to the descending thoracic aorta measures approximately 4.4 x 2.6 cm.  Bulky mediastinal adenopathy.  Index precarinal lymph node measures up to 1.7 cm in short axis dimension.   Of note, patient follows with  with pulmonology for interstitial lung disease.  Bronchoscopy and biopsy of the left hilar mass on 5/31/2023.  The pathology was nondiagnostic for malignancy.  7/5/2023: Tumor markers show mildly elevated CA 19-9 (79.2) and normal CEA and PSA levels.  7/6/2023: Informed patient's wife that the imaging findings are highly concerning for metastatic disease, suspect lung primary.  Pulmonology has been consulted.  As prior bronchoscopic left hilar lung mass biopsy was nondiagnostic, will consult IR for possible L1 soft tissue lesion biopsy. IR plans to perform this procedure on  Monday next week after holding ASA & Plavix.  7/7/2023: Patient remains in pain.  Spoke with his sister (retired RN) at bedside who state patient would not want aggressive measures.  She thinks patient would benefit from comfort care measures only.  She states that patient's wife (next of kin) has early signs of Alzheimer's dementia and is not able to make decisions for the patient.  Patient is currently groggy and unable to maintain a conversation.  However, briefly, he reports of at least getting the biopsy done.   Palliative care consulted  Plan MRI brain as well.  7/8/2023: Continues to be in pain.  Plan to start Dilaudid PCA pump noted.  Again spoke with patient, his wife and daughter regarding ongoing illness.  Patient today categorically states that he wants the biopsy done.  After the biopsy report, he will make a decision whether he wants to receive treatment for that condition or not.  Continue to hold aspirin and Plavix.  Tentative plan for L1 soft tissue lesion biopsy on Monday by IR.  7/9/2023: Persistent pain.  Has been pushing the Dilaudid PCA pump multiple times.  Will consult acute pain management.  Tentative plan for L1 lesion soft tissue biopsy tomorrow.  After the biopsy report, he will make a decision whether he wants to receive treatment for that condition or not.  Continue to hold aspirin and Plavix.   7/10/2023: I will confirm with radiology plans for a biopsy and make sure the appropriate orders were placed     *L1 compression fracture, s/p kyphoplasty: Continue pain control per primary     *Leukocytosis:   WBC 21, from 22, from 26. Likely reactive     *CADZ s/p CABG: Plan to hold Plavix     *Interstitial lung disease: Follows with Dr. Tidwell pulmonology as outpatient     *CKD3: Hypertensive nephropathy.  Follows with nephrology as outpatient     *Pancreatic cyst/duct dilatation:   Follows with GI at [place].  Mildly elevated CA 19-9 (79.2) likely related to this.    *Right knee  effusion  Orthopedics to evaluate    RECOMMENDATIONS:  Plan for a biopsy today.  I will confirm with interventional radiology and placed the appropriate order  Continue pain management  Plans for any systemic therapy depend on his performance status and results of pathology    All issues are new to me today.  Discussed with the patient's nurse.  Discussed with the patient and his family.    Discussed with Dr. Menchaca.  Likely plan for a lung biopsy but if there is something easier to biopsy when he reviews the CT in real-time he may decide to biopsy a different lesion.    Blake Mcgovern MD

## 2023-07-10 NOTE — PROGRESS NOTES
Name: Bob Monahan ADMIT: 2023   : 1950  PCP: Tiarra Yi PA-C    MRN: [id number] LOS: 4 days   AGE/SEX: 73 y.o. male  ROOM: [facility]     Subjective   Subjective   Patient got a few hours sleep last night, the most he is gotten so far since being here.  Did have some reported hallucinations of ants going up his arms, but this morning he seems back to baseline.  Family and friends are at bedside he knows who everyone areas.  Right knee swollen and he patient states that it gets like that when he is not ambulatory.    Review of Systems  Back pain.  Right knee swelling  No fevers, chills, chest pain, dyspnea, cough, abdominal pain, nausea, vomiting, dysuria.     Objective   Objective   Vital Signs  Temp:  [97.4 °F (36.3 °C)-99.2 °F (37.3 °C)] 99.2 °F (37.3 °C)  Heart Rate:  [] 108  Resp:  [16-18] 18  BP: ()/(59-83) 83/59  SpO2:  [93 %-99 %] 95 %  on  Flow (L/min):  [1-5] 3;   Device (Oxygen Therapy): nasal cannula  Body mass index is 24.37 kg/m².  Physical Exam  Vitals and nursing note reviewed.   Constitutional:       General: He is not in acute distress.     Appearance: He is ill-appearing (Chronically).   Cardiovascular:      Rate and Rhythm: Regular rhythm. Tachycardia present.   Pulmonary:      Effort: Pulmonary effort is normal. No respiratory distress.   Abdominal:      General: Abdomen is flat. There is no distension.      Tenderness: There is no abdominal tenderness.   Musculoskeletal:         General: No swelling or deformity.      Comments: Right knee swelling.  No tenderness to palpation. lumbar tenderness   Skin:     General: Skin is warm and dry.      Coloration: Skin is not jaundiced.   Neurological:      General: No focal deficit present.      Mental Status: He is alert. Mental status is at baseline.       Results Review     I reviewed the patient's new clinical results.  Results from last 7 days   Lab Units 07/10/23  1206 23  0720 23  0735 23  0644    WBC 10*3/mm3 19.20* 21.87* 22.06* 26.85*   HEMOGLOBIN g/dL 12.8* 11.3* 13.0 13.5   PLATELETS 10*3/mm3 203 197 190 207       Results from last 7 days   Lab Units 07/10/23  1206 07/09/23  0641 07/08/23  0720 07/07/23  0735   SODIUM mmol/L 134* 138 139 141   POTASSIUM mmol/L 4.4 4.0 4.1 4.5   CHLORIDE mmol/L 106 106 108* 109*   CO2 mmol/L 18.0* 19.0* 18.8* 15.0*   BUN mg/dL 32* 37* 39* 40*   CREATININE mg/dL 1.09 1.17 1.07 1.13   GLUCOSE mg/dL 117* 113* 128* 106*     Estimated Creatinine Clearance: 55.8 mL/min (by C-G formula based on SCr of 1.09 mg/dL).  Results from last 7 days   Lab Units 07/06/23  1035 07/06/23  0644 07/05/23  1202   ALBUMIN g/dL 2.4* 2.7* 3.6   BILIRUBIN mg/dL  --  0.4 0.4   ALK PHOS U/L  --  133* 145*   AST (SGOT) U/L  --  10 13   ALT (SGPT) U/L  --  14 19       Results from last 7 days   Lab Units 07/10/23  1206 07/09/23  0641 07/08/23  0720 07/07/23  0735 07/06/23  1035 07/06/23  0644 07/05/23  1202   CALCIUM mg/dL 9.6 9.7 9.6 9.6  --  9.4 10.2   ALBUMIN g/dL  --   --   --   --  2.4* 2.7* 3.6   MAGNESIUM mg/dL  --   --   --   --   --  2.3  --    PHOSPHORUS mg/dL  --   --   --   --   --  3.8  --        Results from last 7 days   Lab Units 07/05/23  1401 07/05/23  1202   PROCALCITONIN ng/mL  --  0.39*   LACTATE mmol/L 2.0  --      No results found for: COVID19  No results found for: HGBA1C, POCGLU      CT Abdomen Pelvis With Contrast, CT Chest With Contrast Diagnostic  Narrative: CT CHEST, ABDOMEN AND PELVIS WITH CONTRAST     HISTORY: 73-year-old male with evaluation for metastatic disease.     TECHNIQUE: Axial CT images of the chest abdomen and pelvis were obtained  following administration of intravenous and oral contrast. Coronal and  sagittal reconstructions were then obtained.     COMPARISON: CT chest dated 05/05/2023     FINDINGS:     CT CHEST: No pathological axillary lymphadenopathy. Changes of median  sternotomy with aortic valve replacement are present. Calcified coronary  artery  disease is present.     There is bulky mediastinal lymphadenopathy. Index precarinal lymph node  measures up to 1.7 cm in short axis dimension. Prevascular lymph node  measures up to 1.6 cm in short axis dimension. Subcarinal  lymphadenopathy measuring up to 2.3 cm in short axis dimension. In  addition there is marked bilateral hilar lymphadenopathy. Marked  interval increase in homogeneous soft tissue seen within the left  perihilar and posterior left lower lobe. This soft tissue adjacent to  the descending thoracic aorta measures approximately 4.4 x 2.6 cm. This  is highly concerning for a primary bronchogenic neoplasm. In addition  there are numerous scattered pulmonary nodules bilaterally in a pattern  consistent with metastatic disease. Dilated ascending thoracic aorta  measuring up to 3.8 cm. The patient's demonstrated thoracic metastases  is subtle on CT.     CT ABDOMEN AND PELVIS:Numerous hypoattenuating foci within both lobes of  the liver highly suspicious for metastatic disease. The spleen is  somewhat bulky. The gallbladder is distended and otherwise normal. There  is marked dilatation of the pancreatic duct within the body and tail of  the pancreas to the level of the large calcification within the head  region, mildly thickened appearance of the left adrenal gland is  unchanged. No renal calculi or hydronephrosis. Urinary bladder is  moderately distended and normal. Diverticulosis is present. No evidence  of bowel obstruction. An enlarged retrocrural lymph node measures up to  1.6 cm in short axis dimension. There is marked atherosclerotic  calcification within the abdominal aorta and its branches. Status post  kyphoplasty at L1 level where there is paravertebral soft tissue. This  has been better evaluated on the MR of the spine.     Impression: 1. A primary bronchogenic neoplasm is suspected within the left lower  lobe. Metastatic mediastinal, bilateral hilar and retrocrural  lymphadenopathy is  identified. Additional bilateral pulmonary nodules  and hepatic metastatic disease is identified. The thoracic vertebral  body metastases are subtle on CT imaging. Percutaneous biopsy may be  performed from the liver lesions.  2. Marked dilatation of the pancreatic duct within the body and tail of  the pancreas leading up to calcification within the neck/head region.  This may represent an obstructing pancreatic duct calculus. Further  workup recommended.  3. Dilated ascending thoracic aorta.     Radiation dose reduction techniques were utilized, including automated  exposure control and exposure modulation based on body size.     This report was finalized on 7/7/2023 11:33 AM by Dr. Vikki Ring M.D.       I reviewed the patient's daily medications.  Scheduled Medications  lidocaine, 2 patch, Transdermal, Q24H  mesalamine, 1.2 g, Oral, Daily With Breakfast  methocarbamol, 1,000 mg, Oral, Q6H  metoprolol tartrate, 50 mg, Oral, Q8H  pancrelipase (Lip-Prot-Amyl), 12,000 units of lipase, Oral, TID With Meals  pantoprazole, 40 mg, Oral, Q AM  polyethylene glycol, 17 g, Oral, BID  pregabalin, 25 mg, Oral, Q12H  rosuvastatin, 40 mg, Oral, Daily  senna-docusate sodium, 2 tablet, Oral, BID  sodium bicarbonate, 650 mg, Oral, TID  sodium chloride, 10 mL, Intravenous, Q12H  traZODone, 50 mg, Oral, Nightly    Infusions  HYDROmorphone HCl-NaCl,   lactated ringers, 75 mL/hr, Last Rate: 75 mL/hr (07/10/23 1144)  sodium chloride, 30 mL/hr    Diet  NPO Diet NPO Type: Strict NPO         I have personally reviewed:  [x]  Laboratory   [x]  Microbiology   [x]  Radiology   [x]  EKG/Telemetry   []  Cardiology/Vascular   []  Pathology   []  Records     Assessment/Plan     Active Hospital Problems    Diagnosis  POA    **Postoperative back pain [G89.18, M54.9]  Yes    Status post kyphoplasty [Z98.890]  Not Applicable    Tachycardia [R00.0]  Yes    History of esophageal dysphagia [Z87.898]  Yes    Leukocytosis [D72.829]  Yes    History of  recent steroid use [Z92.241]  Not Applicable    Difficulty walking [R26.2]  Yes    Constipated [K59.00]  Yes    Compression fracture of L1 vertebra [S32.010A]  Yes    Impaired fasting glucose [R73.01]  Yes    History of aortic valve replacement [Z95.2]  Not Applicable    S/P CABG (coronary artery bypass graft) [Z95.1]  Not Applicable    Chronic renal insufficiency, stage III (moderate) [N18.30]  Yes    Benign essential hypertension [I10]  Yes      Resolved Hospital Problems   No resolved problems to display.       73 y.o. male admitted with Postoperative back pain.    Metastatic cancer  Primary cancer unknown but suspected to be lung  Intractable back pain  -MRI lumbar spine showing thoracic spine and lumbar spine metastases with epidural tumor  -CT chest/abdomen/pelvis with concern for primary bronchogenic neoplasm in the left lower lobe.  Additional bilateral pulmonary nodules and hepatic metastatic disease.  -On chart review patient had been following up here with lung nodule and had a bronc/EBUS in May with no malignant cells on pathology  -Holding aspirin Plavix so that he can have a biopsy done on Monday by IR  -Continue PCA, Lyrica  -Acute pain consulted, appreciate recommendations    Right knee effusion  -Ortho consulted for possible aspiration, no evidence of septic arthritis at this time    Marked dilatation of the pancreatic duct  Chronic pancreatic ductal calculus  -Reviewed [facility] records pancreatic ductal calculus is chronic  -CA 19-9 is actually decreased from previous been high as 150 at [facility] in the past    Marked leukocytosis, stable  -Continues to downtrend without any antibiotics, procalcitonin 0.39  -Likely secondary to recent steroids as well as malignancy  -Continue to monitor off antibiotics  -Blood cultures with no growth    Coronary artery disease status post CABG  Hypertension  PVD  -Hold home Plavix and aspirin  -Continue home metoprolol, statin    Colitis-continue home mesalamine      ILD    CKD 3B, stable, baseline  Metabolic acidosis,improving  -continuesodium bicarb    Goals of care-discussed CODE STATUS with patient and sister at bedside on 7/9.  Reviewed living well.  CODE STATUS has been changed to DNR/DNI.  At this time patient wishes to have biopsy on Monday to get him for more information and make further decisions based on this.  Palliative care is also been consulted to help with goals of care.    SCDs for DVT prophylaxis.  Full code.  Discussed with family and nursing staff.  Anticipate discharge home with family next week.    Expected Discharge Date: 7/10/2023; Expected Discharge Time:       Manny Santo MD  [facility]  07/10/23  13:09 EDT

## 2023-07-10 NOTE — CASE MANAGEMENT/SOCIAL WORK
Continued Stay Note  [facility]     Patient Name: Bob Monahan  MRN: [id number]  Today's Date: 7/10/2023    Admit Date: 7/5/2023    Plan: Pending clinical course.   Discharge Plan       Row Name 07/10/23 1455       Plan    Plan Pending clinical course.    Plan Comments Home with spouse pending clinical course. Patient will need transportation at discharge. CCP to follow.                   Discharge Codes    No documentation.                 Expected Discharge Date and Time       Expected Discharge Date Expected Discharge Time    Jul 11, 2023

## 2023-07-10 NOTE — PLAN OF CARE
Goal Outcome Evaluation:  Plan of Care Reviewed With: patient        Progress: no change  Outcome Evaluation: VSS, PCA pump continued, increase in confusion and visual hallucinations, tilt turns encouraged as tolerated, F/C care, family at bedside, meds whole in applesauce, Palliative consult ordered, will CTM
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
## 2023-07-10 NOTE — PROGRESS NOTES
Patient was off the floor at CT when I went to see him.  Patient's wife and daughter were at bedside.  I did speak to them for some time.  He states the knee has been an issue over the past few days but feel that he has more pressing matters and I do agree.  They do not want any invasive procedures at this time but are okay with ordering some labs and obtaining an x-ray.  Told him we will look at those findings and we will discuss with them and the patient based on those findings and after his exam prior to proceeding with anything if necessary or warranted.  They were understanding to this.  I did speak with the nurse and she stated that palliative care and pain management have been consulted and were supposed to meet with the family today and she will relay this to the family as well.  They really want the patient to be in comfort at this time.  We will continue to monitor things and again an official consult note will be placed once we can fully examine the patient.  Please call any questions or concerns thank you

## 2023-07-11 NOTE — SIGNIFICANT NOTE
07/11/23 1559   OTHER   Discipline physical therapist   Rehab Time/Intention   Session Not Performed other (see comments)  (PT spoke with RN - pt remains in significant pain and plan is transfer to  when bed available. Not appropriate for acute PT at this time, will sign-off, RN aware. Please re-consult if needed/when appropriate.)   Therapy Assessment/Plan (PT)   Criteria for Skilled Interventions Met (PT) no;does not meet criteria for skilled intervention

## 2023-07-11 NOTE — PLAN OF CARE
Goal Outcome Evaluation:  Plan of Care Reviewed With: patient, family        Progress: no change  Outcome Evaluation: Pt transfered from [facility]. Pt is oriented x1. Very confused. Dilaudid PCA infusing. FC in place. Family at bedside. Doesn't appear to be in any pain. Will cont comfort measures.

## 2023-07-11 NOTE — PROGRESS NOTES
Name: Bob Monahan ADMIT: 2023   : 1950  PCP: Tiarra Yi PA-C    MRN: [id number] LOS: 5 days   AGE/SEX: 73 y.o. male  ROOM: [facility]     Subjective   Subjective   Patient continues to be in pain.  Family at bedside.  Will occasionally rise in pain.  Biopsy done yesterday.    Review of Systems  Back pain.  Right knee swelling  No fevers, chills, chest pain, dyspnea, cough, abdominal pain, nausea, vomiting, dysuria.     Objective   Objective   Vital Signs  Temp:  [96.7 °F (35.9 °C)-99.2 °F (37.3 °C)] 96.7 °F (35.9 °C)  Heart Rate:  [] 94  Resp:  [7-18] 16  BP: ()/(54-84) 119/66  SpO2:  [92 %-99 %] 99 %  on  Flow (L/min):  [3] 3;   Device (Oxygen Therapy): nasal cannula  Body mass index is 23.8 kg/m².  Physical Exam  Vitals and nursing note reviewed.   Constitutional:       General: He is not in acute distress.     Appearance: He is ill-appearing (Chronically).   Cardiovascular:      Rate and Rhythm: Regular rhythm. Tachycardia present.   Pulmonary:      Effort: Pulmonary effort is normal. No respiratory distress.   Abdominal:      General: Abdomen is flat. There is no distension.      Tenderness: There is no abdominal tenderness.   Musculoskeletal:         General: No swelling or deformity.      Comments: Right knee swelling.  No tenderness to palpation. lumbar tenderness   Skin:     General: Skin is warm and dry.      Coloration: Skin is not jaundiced.   Neurological:      General: No focal deficit present.      Mental Status: He is alert. Mental status is at baseline.       Results Review     I reviewed the patient's new clinical results.  Results from last 7 days   Lab Units 23  0736 07/10/23  1206 23  0720 23  0735   WBC 10*3/mm3 18.15* 19.20* 21.87* 22.06*   HEMOGLOBIN g/dL 10.9* 12.8* 11.3* 13.0   PLATELETS 10*3/mm3 208 203 197 190       Results from last 7 days   Lab Units 23  0736 07/10/23  1206 23  0641 23  0720   SODIUM mmol/L 134* 134*  138 139   POTASSIUM mmol/L 4.5 4.4 4.0 4.1   CHLORIDE mmol/L 107 106 106 108*   CO2 mmol/L 17.0* 18.0* 19.0* 18.8*   BUN mg/dL 33* 32* 37* 39*   CREATININE mg/dL 1.12 1.09 1.17 1.07   GLUCOSE mg/dL 112* 117* 113* 128*     Estimated Creatinine Clearance: 53 mL/min (by C-G formula based on SCr of 1.12 mg/dL).  Results from last 7 days   Lab Units 07/11/23  0736 07/06/23  1035 07/06/23  0644 07/05/23  1202   ALBUMIN g/dL 1.6* 2.4* 2.7* 3.6   BILIRUBIN mg/dL 0.4  --  0.4 0.4   ALK PHOS U/L 242*  --  133* 145*   AST (SGOT) U/L 102*  --  10 13   ALT (SGPT) U/L 110*  --  14 19       Results from last 7 days   Lab Units 07/11/23  0736 07/10/23  1206 07/09/23  0641 07/08/23  0720 07/07/23  0735 07/06/23  1035 07/06/23  0644 07/05/23  1202   CALCIUM mg/dL 9.5 9.6 9.7 9.6   < >  --  9.4 10.2   ALBUMIN g/dL 1.6*  --   --   --   --  2.4* 2.7* 3.6   MAGNESIUM mg/dL  --   --   --   --   --   --  2.3  --    PHOSPHORUS mg/dL  --   --   --   --   --   --  3.8  --     < > = values in this interval not displayed.       Results from last 7 days   Lab Units 07/05/23  1401 07/05/23  1202   PROCALCITONIN ng/mL  --  0.39*   LACTATE mmol/L 2.0  --      No results found for: COVID19  No results found for: HGBA1C, POCGLU      XR Chest 1 View  X-RAY CHEST ONE VIEW     HISTORY: 73-year-old male status post CT-guided biopsy earlier today.     FINDINGS: There are innumerable pulmonary nodules in both lungs.  There  is no evidence for a pneumothorax. No new abnormality is seen.     This report was finalized on 7/11/2023 7:29 AM by Dr. Aura Womack M.D.     XR Knee 3 View Right  X-RAY KNEE THREE-VIEW RIGHT     HISTORY: 73-year-old male with pain and swelling of the right knee.     FINDINGS: There is likely a sizable suprapatellar joint effusion and  there may also be soft tissue swelling at the overlying soft tissues.  There are mild-moderate osteoarthritic changes, most prominent at the  lateral tibiofemoral compartment and there is  chondrocalcinosis.     This report was finalized on 7/11/2023 7:27 AM by Dr. Aura Womack M.D.       I reviewed the patient's daily medications.  Scheduled Medications  dexamethasone, 4 mg, Oral, Q6H  mesalamine, 1.2 g, Oral, Daily With Breakfast  methocarbamol, 1,000 mg, Oral, Q6H  metoprolol tartrate, 50 mg, Oral, Q8H  pancrelipase (Lip-Prot-Amyl), 12,000 units of lipase, Oral, TID With Meals  pantoprazole, 40 mg, Oral, Q AM  polyethylene glycol, 17 g, Oral, BID  pregabalin, 25 mg, Oral, Q12H  rosuvastatin, 40 mg, Oral, Daily  senna-docusate sodium, 2 tablet, Oral, BID  sodium bicarbonate, 650 mg, Oral, TID  sodium chloride, 10 mL, Intravenous, Q12H  traZODone, 50 mg, Oral, Nightly    Infusions  HYDROmorphone HCl-NaCl,   lactated ringers, 75 mL/hr, Last Rate: 75 mL/hr (07/10/23 1144)  sodium chloride, 30 mL/hr  sodium chloride, 30 mL/hr    Diet  Diet: Regular/House Diet; Texture: Regular Texture (IDDSI 7); Fluid Consistency: Thin (IDDSI 0)         I have personally reviewed:  [x]  Laboratory   [x]  Microbiology   [x]  Radiology   [x]  EKG/Telemetry   []  Cardiology/Vascular   []  Pathology   []  Records     Assessment/Plan     Active Hospital Problems    Diagnosis  POA    **Postoperative back pain [G89.18, M54.9]  Yes    Status post kyphoplasty [Z98.890]  Not Applicable    Tachycardia [R00.0]  Yes    History of esophageal dysphagia [Z87.898]  Yes    Leukocytosis [D72.829]  Yes    History of recent steroid use [Z92.241]  Not Applicable    Difficulty walking [R26.2]  Yes    Constipated [K59.00]  Yes    Compression fracture of L1 vertebra [S32.010A]  Yes    Impaired fasting glucose [R73.01]  Yes    History of aortic valve replacement [Z95.2]  Not Applicable    S/P CABG (coronary artery bypass graft) [Z95.1]  Not Applicable    Chronic renal insufficiency, stage III (moderate) [N18.30]  Yes    Benign essential hypertension [I10]  Yes      Resolved Hospital Problems   No resolved problems to display.       73 y.o. male  admitted with Postoperative back pain.    Goals of care-discussed CODE STATUS with patient and sister at bedside on 7/9.  Reviewed living well.  CODE STATUS has been changed to DNR/DNI.    7/11-have discussed with stepdaughter and sisters at bedside.  Patient has become more confused and is alert to self only.  Also discussed with his step son-in-law, Tashi, yesterday on the phone for about 20 minutes.  Family wanting to transition patient to [facility], our palliative care floor at this time.  They would like to stop labs.  IR reached out to me yesterday to tell me that his mass seems to have already grown by a centimeter since his last scans a few days ago.  I discussed this with the family, they understand that this is a very aggressive cancer.  And their main goals at this time is to make patient comfortable.  Because of patient's intense pain with any movement, unable able to transfer beds, radiation would not be well-tolerated.  Have reached out to acute pain about possible epidural, they plan on doing this procedure tomorrow since his last dose of Plavix was 7/5.  Have discussed with pharmacy and oncology, high-dose steroids were initiated today.  Will titrate PCA pump as possible.  Have updated palliative care team.  Family wishes to keep patient in the hospital as they would not be able to take care of him at home in his current state.  Hospice consult was in place.  We will start to discontinue medications that do not align with comfort care.    Metastatic cancer  Primary cancer unknown but suspected to be lung  Intractable back pain  -MRI lumbar spine showing thoracic spine and lumbar spine metastases with epidural tumor  -CT chest/abdomen/pelvis with concern for primary bronchogenic neoplasm in the left lower lobe.  Additional bilateral pulmonary nodules and hepatic metastatic disease.  -On chart review patient had been following up here with lung nodule and had a bronc/EBUS in May with no malignant cells  on pathology  -Holding aspirin Plavix so that he can have a biopsy done on Monday by IR  -Continue PCA, Lyrica  -Acute pain consulted, appreciate recommendations    Right knee effusion  -Ortho consulted     Marked dilatation of the pancreatic duct  Chronic pancreatic ductal calculus  -Reviewed [facility] records pancreatic ductal calculus is chronic  -CA 19-9 is actually decreased from previous been high as 150 at [facility] in the past    Marked leukocytosis, stable  -Continues to downtrend without any antibiotics, procalcitonin 0.39  -Likely secondary to recent steroids as well as malignancy  -Continue to monitor off antibiotics  -Blood cultures with no growth    Coronary artery disease status post CABG  Hypertension  PVD  -Hold home Plavix and aspirin  -Continue home metoprolol, statin discontinued    Colitis-continue home mesalamine     ILD    CKD 3B, stable, baseline  Metabolic acidosis,improving  -Sodium bicarb discontinued      SCDs for DVT prophylaxis.  Full code.  Discussed with family and nursing staff.  Anticipate discharge hospice scatter bed in 1 to 2 days.    Expected Discharge Date: 7/12/2023; Expected Discharge Time:       Manny Santo MD  [facility]  07/11/23  10:07 EDT
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
## 2023-07-11 NOTE — PLAN OF CARE
Goal Outcome Evaluation:                 Patient is alert and oriented x 4 but confused at times,  he can answer orientation questions but then says off the wall things. PCA dilaudid pump in use, this does not seem to be controlling his pain, called nurse practitioner and made aware of pain and stated that she would put orders in but has not done so at this time, secure chat sent regarding pain levels, patient refuses to turn due to uncontrolled pain, family at bedside, safety precautions in use, nursing will continue to monitor for the remainder of the shift.
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
## 2023-07-11 NOTE — CONSULTS
Assessed patient at bedside. Patient sleeping and does appear to be in distress.    Met with Amanda (Spouse). They have clear understanding of patient's clinical condition. Reviewed medical history, recent decline, and concerns. Reviewed hospice services and what they may look like in the hospital and at home. Amanda is concerned about pain control/management at home and would like to keep him here on the palliative unit (Melani ZUNIGA made me aware that he is moving on [address] today). I called and spoke with our hospice MD for inpatient eligibility. Verbal certification provided for GIP hospice admission. I spoke with Amanda to make her aware of decision. She verbalized understanding. She would like to take some time to think about things once he is settled on the new unit. I spoke with NADIA Jacob to obtain report on patient as well.     Explanation of services provided with focus on pain management. Answered all questions, discussed medications, symptom management needs, and goals of care. [facility] information provided and encouraged to call with any needs.    [facility] will follow up daily with hospital. Family request follow up in a few days. Updated Melani ZUNIGA. Please call with any questions, concerns, or change in patient status. Thank you for allowing us the opportunity to participate in the care of this patient/family.     Faustino Mcpherson, BALWINDERN, RN  Referral and Admission Coordinator  [facility]  [phone]

## 2023-07-11 NOTE — PROGRESS NOTES
[facility] INPATIENT PROGRESS NOTE    Length of Stay:  5 days    CHIEF COMPLAINT/REASON FOR VISIT:  Metastatic cancer, suspected lung origin    SUBJECTIVE: Afebrile.  On 3 L nasal cannula.  Yesterday he had a CT-guided lung biopsy performed.  Orthopedics evaluated the situation and the family declined arthrocentesis.  He remains in a lot of pain in his pelvis, back, and shoulders.    ROS:  Patient drowsy.  Complete review of systems not able to be obtained.    OBJECTIVE:  Vitals:    07/11/23 0434 07/11/23 0603 07/11/23 0740 07/11/23 0849   BP: 114/84  92/67 119/66   BP Location: Left arm  Left arm Left arm   Patient Position: Lying  Lying Lying   Pulse: 85 88 90 94   Resp: 16 16 16    Temp:   96.7 °F (35.9 °C)    TempSrc:   Oral    SpO2: 97%  99%    Weight: 62.9 kg (138 lb 10.7 oz)      Height:             PHYSICAL EXAMINATION:   General: Mildly distressed.  A little more drowsy today.  Lying in bed.  Chest/Lungs: Clear to auscultation anteriorly  Heart: Regular rate and rhythm  Abdomen/GI: Soft, nontender, nondistended, normal active bowel sounds  Extremities: Warm and well perfused.  There is a right knee effusion present.    DIAGNOSTIC DATA:  Results Review:     I reviewed the patient's new clinical results.    Results from last 7 days   Lab Units 07/11/23  0736   WBC 10*3/mm3 18.15*   HEMOGLOBIN g/dL 10.9*   HEMATOCRIT % 32.9*   PLATELETS 10*3/mm3 208     Lab Results   Component Value Date    NEUTROABS 15.54 (H) 07/11/2023     Results from last 7 days   Lab Units 07/11/23  0736   SODIUM mmol/L 134*   POTASSIUM mmol/L 4.5   CHLORIDE mmol/L 107   CO2 mmol/L 17.0*   BUN mg/dL 33*   CREATININE mg/dL 1.12   GLUCOSE mg/dL 112*   CALCIUM mg/dL 9.5     Results from last 7 days   Lab Units 07/10/23  0833   INR  1.40*     Results from last 7 days   Lab Units 07/06/23  0644   MAGNESIUM mg/dL 2.3         IMAGING:    CT Chest With Contrast Diagnostic (07/06/2023 09:27)  CT Abdomen Pelvis With Contrast  (07/06/2023 09:27)      ASSESSMENT:  This is a 73 y.o. male with:     *Suspected metastatic disease, suspected lung primary:   Presented to [facility] ED on 7/5/2023 with worsening back pain and difficulty walking after L1 kyphoplasty procedure on 6/27/2023.  A bone biopsy performed during the procedure was negative for malignancy.   MRI thoracic and lumbar spine performed on 7/5/2023 noted findings concerning for metastatic disease involving the thoracic and lumbar spines.  There is epidural tumor present in the lower thoracic region extending to L1 where it is most severe.  Tumor also extends into neural foramina at L1-L2 and to a lesser extent T12-L1.  All these are new since last imaging in early May 2023.  CT C/A/P with marked interval increase in homogeneous soft tissue seen within the left perihilar and posterior left lower lobes.  The soft tissue adjacent to the descending thoracic aorta measures approximately 4.4 x 2.6 cm.  Bulky mediastinal adenopathy.  Index precarinal lymph node measures up to 1.7 cm in short axis dimension.   Of note, patient follows with  with pulmonology for interstitial lung disease.  Bronchoscopy and biopsy of the left hilar mass on 5/31/2023.  The pathology was nondiagnostic for malignancy.  7/5/2023: Tumor markers show mildly elevated CA 19-9 (79.2) and normal CEA and PSA levels.  7/6/2023: Informed patient's wife that the imaging findings are highly concerning for metastatic disease, suspect lung primary.  Pulmonology has been consulted.  As prior bronchoscopic left hilar lung mass biopsy was nondiagnostic, will consult IR for possible L1 soft tissue lesion biopsy. IR plans to perform this procedure on Monday next week after holding ASA & Plavix.  7/7/2023: Patient remains in pain.  Spoke with his sister (retired RN) at bedside who state patient would not want aggressive measures.  She thinks patient would benefit from comfort care measures only.  She  states that patient's wife (next of kin) has early signs of Alzheimer's dementia and is not able to make decisions for the patient.  Patient is currently groggy and unable to maintain a conversation.  However, briefly, he reports of at least getting the biopsy done.   Palliative care consulted  Plan MRI brain as well.  7/8/2023: Continues to be in pain.  Plan to start Dilaudid PCA pump noted.  Again spoke with patient, his wife and daughter regarding ongoing illness.  Patient today categorically states that he wants the biopsy done.  After the biopsy report, he will make a decision whether he wants to receive treatment for that condition or not.  Continue to hold aspirin and Plavix.    7/9/2023: Persistent pain.  Has been pushing the Dilaudid PCA pump multiple times.  On Lyrica.  On Robaxin.  CT-guided lung biopsy performed on 7/10/2023.  Pathology pending.     *L1 compression fracture, s/p kyphoplasty: Continue pain control per primary     *Leukocytosis:   WBC 18, from 19, 21, from 22, from 26. Likely reactive     *Normocytic anemia  Hemoglobin 10.9, from 12.8, from 11.3    *Elevated transaminases  , , elevated from normal at 10 and 14 respectively  Bilirubin normal at 0.4, alkaline phosphatase elevated at 242    *Severe hypoalbuminemia  Albumin 1.6, from 2.4, from 3.6    *CADZ s/p CABG: Antiplatelet agents held for biopsy.  Resume when appropriate.     *Interstitial lung disease: Follows with Dr. Tidwell pulmonology as outpatient     *CKD3: Hypertensive nephropathy.  Follows with nephrology as outpatient  Creatinine 1.12, from 1.09, from 1.17, from 1.07     *Pancreatic cyst/duct dilatation:   Follows with GI at [place].  Mildly elevated CA 19-9 (79.2) likely related to this.    *Right knee effusion  Orthopedics has evaluated.  Family declined arthrocentesis.  This does not seem to be causing him any pain at this point.    RECOMMENDATIONS:  Follow-up results from the CT-guided lung biopsy  Continue pain  management with adjustment in the PCA as appropriate.  We will also add dexamethasone 4 mg orally every 6 hours  He continues Lyrica and Robaxin  Discontinue Crestor  The anesthesiology pain service has evaluated and may consider an epidural for pain relief.  Palliative care following.  Appreciate assistance.  Management of his knee per orthopedic surgery and per the family's wishes.  For now, no arthrocentesis is going to be performed.  With poor performance status, he is not a candidate for systemic therapy for his malignancy.  I do not think that radiation is appropriate as a pain management modality as he is not able to get out of his bed due to severe pain and therefore would not be able to easily transport to radiation or get on the table for radiation.    Long discussion with multiple family members at the bedside today as well as a family member anesthesiologist who was on the phone.  An epidural is not a usual modality of therapy to try to help his pain but if the anesthesia service here thinks it is reasonable I am not opposed.  We will start oral dexamethasone for management of his pain as well.  Due to poor performance status, he will not be a candidate for systemic therapy for his malignancy although we do have pathology pending to determine what kind of malignancy he has.  His family very much wants for him to be transferred to the palliative unit which is certainly appropriate.  We will continue to focus on comfort measures which is mainly pain control.  They understand that he may be more sedated but we are trying other methods other than opiates to try to control his pain so that he may be less sedated.    We discussed end-of-life issues and pursuing palliative care today.  This is a life-threatening situation.  Discussed with Dr. Santo.  Discussed with pharmacy staff.  Discussed with the palliative care service.    Blake Mcgovern MD

## 2023-07-11 NOTE — PROGRESS NOTES
.            [facility] Palliative Care Services    Palliative Care Daily Progress Note   Chief complaint-follow up transfer to comfort care bed/unit    Code Status:   Code Status and Medical Interventions:   Ordered at: 07/11/23 1017     Code Status (Patient has no pulse and is not breathing):    No CPR (Do Not Attempt to Resuscitate)     Medical Interventions (Patient has pulse or is breathing):    Comfort Measures      Advanced Directives: Advance Directive Status: Patient has advance directive, copy in chart   Goals of Care: Ongoing.     S: Medical record reviewed. Events noted.  Patient resting in bed, appears uncomfortable. Visual hallucinations. Family at bedside. Reviewed MAR, labs, medical notes and therapy notes. Spoke with RN.              Review of Systems   Unable to perform ROS: Mental status change   Pain Assessment  Nonverbal Indicators of Pain: restless, grimace  Pain Location: back, hip  Pain Description: constant    O:     Intake/Output Summary (Last 24 hours) at 7/11/2023 1036  Last data filed at 7/11/2023 0434  Gross per 24 hour   Intake --   Output 1000 ml   Net -1000 ml       Diagnostics and current medications: Reviewed.    Current Facility-Administered Medications   Medication Dose Route Frequency Provider Last Rate Last Admin    acetaminophen (TYLENOL) tablet 650 mg  650 mg Oral Q4H PRN Nany Powers MD        Or    acetaminophen (TYLENOL) 160 MG/5ML solution 650 mg  650 mg Oral Q4H PRN Nany Powers MD        Or    acetaminophen (TYLENOL) suppository 650 mg  650 mg Rectal Q4H PRN Nany Powers MD        sennosides-docusate (PERICOLACE) 8.6-50 MG per tablet 2 tablet  2 tablet Oral BID Nany Powers MD   2 tablet at 07/11/23 0831    And    polyethylene glycol (MIRALAX) packet 17 g  17 g Oral Daily PRN Nany Powers MD        And    bisacodyl (DULCOLAX) EC tablet 5 mg  5 mg Oral Daily PRN Nany Powers MD        And     bisacodyl (DULCOLAX) suppository 10 mg  10 mg Rectal Daily PRN Nany Powers MD        calcium carbonate (TUMS) chewable tablet 500 mg (200 mg elemental)  2 tablet Oral BID PRN Nany Powers MD        dexamethasone (DECADRON) tablet 4 mg  4 mg Oral Q6H Blake Mcgovern MD        HYDROmorphone (DILAUDID) PCA 1 mg/mL syringe   Intravenous Continuous Manny Santo MD        lactated ringers infusion  75 mL/hr Intravenous Continuous Manny Santo MD 75 mL/hr at 07/10/23 1144 75 mL/hr at 07/10/23 1144    LORazepam (ATIVAN) tablet 0.5 mg  0.5 mg Oral Q6H PRN Manny Santo MD   0.5 mg at 07/11/23 0556    melatonin tablet 1 mg  1 mg Oral Nightly PRN Nany Powers MD   1 mg at 07/10/23 2053    mesalamine (LIALDA) EC tablet 1.2 g  1.2 g Oral Daily With Breakfast Nany Powers MD   1.2 g at 07/11/23 0831    methocarbamol (ROBAXIN) tablet 1,000 mg  1,000 mg Oral Q6H Manny Santo MD   1,000 mg at 07/11/23 0556    metoprolol tartrate (LOPRESSOR) tablet 50 mg  50 mg Oral Q8H Manny Santo MD   50 mg at 07/11/23 0855    naloxone (NARCAN) injection 0.1 mg  0.1 mg Intravenous Q5 Min PRN Manny Santo MD        naloxone (NARCAN) injection 0.1 mg  0.1 mg Intravenous Q5 Min PRN Manny Santo MD        ondansetron (ZOFRAN) tablet 4 mg  4 mg Oral Q6H PRN Nany Powers MD        Or    ondansetron (ZOFRAN) injection 4 mg  4 mg Intravenous Q6H PRN Nany Powers MD   4 mg at 07/08/23 1341    pancrelipase (Lip-Prot-Amyl) (CREON) capsule 12,000 units of lipase  12,000 units of lipase Oral TID With Meals Nany Powers MD   12,000 units of lipase at 07/11/23 0830    pantoprazole (PROTONIX) EC tablet 40 mg  40 mg Oral Q AM Nany Powers MD   40 mg at 07/11/23 0556    polyethylene glycol (MIRALAX) packet 17 g  17 g Oral BID Manny Santo MD   17 g at 07/11/23 0830    pregabalin (LYRICA) capsule 25 mg  25 mg Oral Q12H Manny Santo MD   25 mg at 07/11/23 0831     "sodium chloride 0.9 % flush 10 mL  10 mL Intravenous Q12H Nany Powers MD   10 mL at 07/11/23 0847    sodium chloride 0.9 % flush 10 mL  10 mL Intravenous PRN Nany Powers MD        sodium chloride 0.9 % infusion 40 mL  40 mL Intravenous PRN Nany Powers MD        sodium chloride 0.9 % infusion  30 mL/hr Intravenous Continuous PRN Manny Santo MD        sodium chloride 0.9 % infusion  30 mL/hr Intravenous Continuous PRN Manny Santo MD        traZODone (DESYREL) tablet 50 mg  50 mg Oral Nightly Manny Santo MD   50 mg at 07/10/23 2042     HYDROmorphone HCl-NaCl,   lactated ringers, 75 mL/hr, Last Rate: 75 mL/hr (07/10/23 1144)  sodium chloride, 30 mL/hr  sodium chloride, 30 mL/hr        acetaminophen **OR** acetaminophen **OR** acetaminophen    senna-docusate sodium **AND** polyethylene glycol **AND** bisacodyl **AND** bisacodyl    calcium carbonate    LORazepam    melatonin    naloxone    naloxone    ondansetron **OR** ondansetron    sodium chloride    sodium chloride    sodium chloride    sodium chloride  Attest that current medications reviewed including but not limited to prescriptions, over-the counter, herbals and vitamin/mineral/dietary (nutritional) supplements for name, route of administration, type, dose and frequency and are current using all immediate resources available at time of dictation.    A:    /66 (BP Location: Left arm, Patient Position: Lying)   Pulse 94   Temp 96.7 °F (35.9 °C) (Oral)   Resp 16   Ht 162.6 cm (64\")   Wt 62.9 kg (138 lb 10.7 oz)   SpO2 99%   BMI 23.80 kg/m²     Constitutional:       Appearance: Acutely ill-appearing.      Interventions: Nasal cannula in place.      Comments: NC 3L    Pulmonary:      Effort: Pulmonary effort is normal.      Breath sounds: Normal breath sounds.   Cardiovascular:      PMI at left midclavicular line. Normal rate. Regular rhythm.      Murmurs: There is no murmur.   Abdominal:      General: Bowel " sounds are normal.      Palpations: Abdomen is soft.      Tenderness: There is no abdominal tenderness.   Musculoskeletal:      Right knee: Swelling present. No erythema or ecchymosis. No tenderness.      Left knee: No swelling, erythema or ecchymosis. No tenderness. Genitourinary:     Comments: F/c with yellow urine   Neurological:      Mental Status: Alert and oriented to person, place, and time.      Comments: Oriented to self, location and year  Unsure of situation fully    Psychiatric:         Attention and Perception: Perceives visual hallucinations.         Mood and Affect: Mood normal.         Behavior: Behavior is cooperative.      Comments: Ask me to help get the needle out of his hands       Patient status: Disease state: No further treatment being pursued.  Functional status: Palliative Performance Scale Score: Performance 40% based on the following measures: Ambulation: Mainly in bed, Activity and Evidence of Disease: Unable to do any work, extensive evidence of disease, Self-Care: Mainly assistance required,  Intake: Normal or reduced, LOC: Full, drowsy or confusion   ECOG Status(3) Capable of limited self-care, confined to bed or chair > 50% of waking hours.  Nutritional status:  moderate . Body mass index is 23.8 kg/m².  Screening Status/Interventions Data  Psychosocial Needs: neg  Spiritual Needs: neg  Goals of Care/ACP: pos  Goals of Care/ACP Intervened: yes   Palliative Care Acuity Data  Psychosocial Acuity: moderate complexity  Spiritual Acuity: normal complexity    Impression/Problem List:     Postoperative back pain  L1 compression fracture s/p kyphosplasty   Suspected metastatic disease   Chronic kidney disease stage III   Interstitial lung disease  Coronary artery disease s/p CABG  Right knee effusion    Pancreatic cystic/duct dilatation        Recommendations/Plan:  1.Transfer to inpatient palliative care unit to focus on pain management   2. [facility] consult   3. Pain management consult  pending            2.  Palliative care encounter  7/10/2023- The patient was off unit for testing and I was informed he has had periods of confusion. I spoke with his wife, step daughter, Mariam, three sisters (Saloni, Tutu, and Nisa) along with sister in law, Nisa regarding goals, which patient spouse gave permission. They have a fairly good understanding of patient condition and are aware of concerns for metastatic cancer. They report prior to hospitalization patient was independent with ADLs and in fact was still working. They report that they don't believe patient would want to proceed with any life prolonging measures and also wouldn't want to consider chemotherapy if even an option, based on his previous experience with his father who had lung cancer.  At this time, there biggest concern is that his pain be managed, which they report he has high pain tolerance, which they discussed with Dr Santo earlier today. The patient step daughter has requested Dr Santo reach out to her spouse who is an anesthesiologist, which I have informed him. I [provided patient family information regarding palliative care, inpatient palliative care unit, [facility]. The spouse is overwhelmed and would like to remain on current unit for now and process all information provided. I will plan to follow up with her and patient if he is able to participate. I didn't address CODE status as this was already addressed. I spoke with NADIA Shoemaker.     7/11/2023- The patient is unable to participate in goals of care conversation due to confusion. I spoke with multiple family members who were present yesterday, along with a brother in law and his spouse. His wife is in route to hospital and I will return later to support. They are wanting for his pain to be controlled without sedation, if possible. They have spoke with Dr Santo, Dr Mcgovern and Aura, pharmacist this morning. They were updated on conversation that the plan at this time is to start with  oral steroids and pain management is due to see patient today as well to provide recommendations regarding epidural. They will plan to transfer to inpatient palliative care unit with goals geared towards pain management. They were given information regarding unit structure, pet policy, and visitor policy. They are awaiting biopsy and aware that treatment will probably not be an option per oncology, however will await final options once pathology results available. I spoke with Dr Santo, Aura Tobin, pharmacist, and Melani RN.         1320: I followed up with patient spouse,  Amanda at bedside. She expresses that her goals for her spouse is to make sure that his pain is well controlled. They are still awaiting pathology reports and would like to receive that news from oncology once available. They are awaiting pain management as well and I informed them of recorded notes from Dr Santo regarding possible epidural tomorrow. They are ok with transferring to inpatient palliative care unit, continue current medications with adjustments as warranted.       Thank you for this consult and allowing us to participate in patient's plan of care. Palliative Care Team will continue to follow patient.         Time spent:30 minutes spent reviewing medical and medication records, assessing and examining patient, discussing with family, answering questions, providing some guidance about a plan and documentation of care, and coordinating care with other healthcare members, with > 50% time spent face to face.     Shelli Monahan, PATRICK  7/11/2023  10:36 EDT
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
## 2023-07-12 NOTE — PROGRESS NOTES
[facility] INPATIENT PROGRESS NOTE    Length of Stay:  6 days    CHIEF COMPLAINT/REASON FOR VISIT:  Metastatic cancer, suspected lung origin    SUBJECTIVE: Afebrile.  Normotensive.  On 3 L nasal cannula.  Today he is more awake.  Complains of right knee stiffness but no pain.  Complains of back pain.  PCA seems to be helping.    OBJECTIVE:  Vitals:    07/11/23 1707 07/11/23 1851 07/12/23 0500 07/12/23 0946   BP: 101/76 99/68 137/100 118/87   BP Location: Left arm  Right arm    Patient Position: Lying  Lying    Pulse: 90 101 87 106   Resp: 16  23    Temp: 97.1 °F (36.2 °C)  97.1 °F (36.2 °C)    TempSrc: Oral  Axillary    SpO2: 97%  96%    Weight:       Height:             PHYSICAL EXAMINATION:   General:  No acute distress, awake, alert.   Skin:  Warm and dry, no visible rash  HEENT:  Normocephalic/atraumatic.    Chest:  Normal respiratory effort  Extremities: Right knee effusion present       DIAGNOSTIC DATA:  Results Review:     I reviewed the patient's new clinical results.    Results from last 7 days   Lab Units 07/11/23  0736   WBC 10*3/mm3 18.15*   HEMOGLOBIN g/dL 10.9*   HEMATOCRIT % 32.9*   PLATELETS 10*3/mm3 208     Lab Results   Component Value Date    NEUTROABS 15.54 (H) 07/11/2023     Results from last 7 days   Lab Units 07/11/23  0736   SODIUM mmol/L 134*   POTASSIUM mmol/L 4.5   CHLORIDE mmol/L 107   CO2 mmol/L 17.0*   BUN mg/dL 33*   CREATININE mg/dL 1.12   GLUCOSE mg/dL 112*   CALCIUM mg/dL 9.5     Results from last 7 days   Lab Units 07/10/23  0833   INR  1.40*     Results from last 7 days   Lab Units 07/06/23  0644   MAGNESIUM mg/dL 2.3         IMAGING:    CT Chest With Contrast Diagnostic (07/06/2023 09:27)  CT Abdomen Pelvis With Contrast (07/06/2023 09:27)      ASSESSMENT:  This is a 73 y.o. male with:     *Suspected metastatic disease, suspected lung primary:   Presented to [facility] ED on 7/5/2023 with worsening back pain and difficulty walking after L1 kyphoplasty  procedure on 6/27/2023.  A bone biopsy performed during the procedure was negative for malignancy.   MRI thoracic and lumbar spine performed on 7/5/2023 noted findings concerning for metastatic disease involving the thoracic and lumbar spines.  There is epidural tumor present in the lower thoracic region extending to L1 where it is most severe.  Tumor also extends into neural foramina at L1-L2 and to a lesser extent T12-L1.  All these are new since last imaging in early May 2023.  CT C/A/P with marked interval increase in homogeneous soft tissue seen within the left perihilar and posterior left lower lobes.  The soft tissue adjacent to the descending thoracic aorta measures approximately 4.4 x 2.6 cm.  Bulky mediastinal adenopathy.  Index precarinal lymph node measures up to 1.7 cm in short axis dimension.   Of note, patient follows with  with pulmonology for interstitial lung disease.  Bronchoscopy and biopsy of the left hilar mass on 5/31/2023.  The pathology was nondiagnostic for malignancy.  7/5/2023: Tumor markers show mildly elevated CA 19-9 (79.2) and normal CEA and PSA levels.  7/6/2023: Informed patient's wife that the imaging findings are highly concerning for metastatic disease, suspect lung primary.  Pulmonology has been consulted.  As prior bronchoscopic left hilar lung mass biopsy was nondiagnostic, will consult IR for possible L1 soft tissue lesion biopsy. IR plans to perform this procedure on Monday next week after holding ASA & Plavix.  7/7/2023: Patient remains in pain.  Spoke with his sister (retired RN) at bedside who state patient would not want aggressive measures.  She thinks patient would benefit from comfort care measures only.  She states that patient's wife (next of kin) has early signs of Alzheimer's dementia and is not able to make decisions for the patient.  Patient is currently groggy and unable to maintain a conversation.  However, briefly, he reports of at least getting the  biopsy done.   Palliative care consulted  Plan MRI brain as well.  7/8/2023: Continues to be in pain.  Plan to start Dilaudid PCA pump noted.  Again spoke with patient, his wife and daughter regarding ongoing illness.  Patient today categorically states that he wants the biopsy done.  After the biopsy report, he will make a decision whether he wants to receive treatment for that condition or not.  Continue to hold aspirin and Plavix.    7/9/2023: Persistent pain.  Has been pushing the Dilaudid PCA pump multiple times.  On Lyrica.  On Robaxin.  CT-guided lung biopsy performed on 7/10/2023.  Pathology remains pending.     *L1 compression fracture, s/p kyphoplasty: Continue pain control per primary     *Leukocytosis:   Likely reactive.  No further labs planned.     *Normocytic anemia    *Elevated transaminases  , , elevated from normal at 10 and 14 respectively  Bilirubin normal at 0.4, alkaline phosphatase elevated at 242    *Severe hypoalbuminemia  Last albumin 1.6, from 2.4, from 3.6    *Coronary artery disease s/p CABG: Antiplatelet agents held for biopsy.      *Interstitial lung disease: Follows with Dr. Tidwell pulmonology as outpatient     *CKD3: Hypertensive nephropathy.  Follows with nephrology as outpatient  Creatinine 1.12, from 1.09, from 1.17, from 1.07.  No further labs being checked.     *Pancreatic cyst/duct dilatation:   Follows with GI at [place].  Mildly elevated CA 19-9 (79.2) likely related to this.    *Right knee effusion  Orthopedics has evaluated.  Family declined arthrocentesis.     RECOMMENDATIONS:  Follow-up results from the CT-guided lung biopsy although we will not be doing anything to treat his malignancy.  Continue pain management with adjustment in the PCA as appropriate.  We will continue dexamethasone 4 mg orally every 6 hours  He continues Lyrica and Robaxin  Discontinue Crestor  The anesthesiology pain service has evaluated and it is not felt that an epidural is  possible  With poor performance status, he is not a candidate for systemic therapy for his malignancy.  I do not think that radiation is appropriate as a pain management modality as he is not able to get out of his bed due to severe pain and therefore would not be able to easily transport to radiation or get on the table for radiation.  Ultimately, continue comfort measures    We will follow-up pathology.  Discussed with his family at the bedside today.  Discussed with the patient as well.    Blake Mcgovern MD

## 2023-07-12 NOTE — PROGRESS NOTES
Discharge Planning Assessment  [facility]     Patient Name: Bob Monahan  MRN: [id number]  Today's Date: 7/12/2023    Admit Date: 7/5/2023    Plan: Pending clinical course.   Discharge Needs Assessment    No documentation.                  Discharge Plan       Row Name 07/12/23 1656       Plan    Plan Comments The patient transferred to [facility] from [facility] on 7/11/23. The patient is palliative. [facility] to evaluate. The original discharge plan was home with spouse and transportation would need to be arranged. CCP will continue to follow for any needs that may arise. CLARISSA Arguelles RN, CCP.                  Continued Care and Services - Admitted Since 7/5/2023    Coordination has not been started for this encounter.       Expected Discharge Date and Time       Expected Discharge Date Expected Discharge Time    Jul 12, 2023            Demographic Summary    No documentation.                  Functional Status    No documentation.                  Psychosocial    No documentation.                  Abuse/Neglect    No documentation.                  Legal    No documentation.                  Substance Abuse    No documentation.                  Patient Forms    No documentation.                     Laura Arguelles RN

## 2023-07-12 NOTE — PROGRESS NOTES
Palliative Social Work Note    Purpose of visit: Initial visit  Assessment: Patient alert and RN performing care. Spoke with family outside of room with Palliative .   Support System: Present at bedside, Involved in care, and Family  Psychosocial Needs Identified: Coping and Major Change/Loss/Stressor  Plan/Other Comments:   Spoke to patient's sisters Tutu and Nisa Mccarthy as well as NEIL Paula for support. Family shared about patient's progressive cancer and trying to support patient and his spouse through this time. Family acknowledged it is their goal to advocate for patient's needs. They expressed their goal is to promote patient's comfort at this stage in his life. Reviewed role and purpose of visit for emotional support. Palliative team will continue to be available for support as needed.

## 2023-07-12 NOTE — PLAN OF CARE
Goal Outcome Evaluation:  Plan of Care Reviewed With: patient, family        Progress: no change  Outcome Evaluation: Pt is alert but confused. Dilaudid PCA infusing. Pt reports pain a 0/10 and states that the pain button works well for him. FC in place. A lot of family at bedside throughout shift. Anestheseologist is coming back tomm per MD. Will cont plan of care.

## 2023-07-12 NOTE — PROGRESS NOTES
Name: Bob Monahan ADMIT: 2023   : 1950  PCP: Tiarra Yi PA-C    MRN: [id number] LOS: 6 days   AGE/SEX: 73 y.o. male  ROOM: [facility]     Subjective   Subjective   Patient much more lucid and pain much better controlled today compared to yesterday.    Review of Systems  Back pain.  Right knee swelling, no tenderness.  No fevers, chills, chest pain, dyspnea, cough, abdominal pain, nausea, vomiting, dysuria.     Objective   Objective   Vital Signs  Temp:  [97.1 °F (36.2 °C)-98.3 °F (36.8 °C)] 97.2 °F (36.2 °C)  Heart Rate:  [] 92  Resp:  [16-23] 20  BP: ()/() 130/94  SpO2:  [94 %-98 %] 94 %  on  Flow (L/min):  [3] 3;   Device (Oxygen Therapy): nasal cannula  Body mass index is 23.8 kg/m².  Physical Exam  Vitals and nursing note reviewed.   Constitutional:       General: He is not in acute distress.     Appearance: He is ill-appearing (Chronically).   Cardiovascular:      Rate and Rhythm: Regular rhythm. Tachycardia present.   Pulmonary:      Effort: Pulmonary effort is normal. No respiratory distress.   Abdominal:      General: Abdomen is flat. There is no distension.      Tenderness: There is no abdominal tenderness.   Musculoskeletal:         General: No swelling or deformity.      Comments: Right knee swelling.  No tenderness to palpation. lumbar tenderness   Skin:     General: Skin is warm and dry.      Coloration: Skin is not jaundiced.   Neurological:      General: No focal deficit present.      Mental Status: He is alert. Mental status is at baseline.       Results Review     I reviewed the patient's new clinical results.  Results from last 7 days   Lab Units 23  0736 07/10/23  1206 23  0720 23  0735   WBC 10*3/mm3 18.15* 19.20* 21.87* 22.06*   HEMOGLOBIN g/dL 10.9* 12.8* 11.3* 13.0   PLATELETS 10*3/mm3 208 203 197 190       Results from last 7 days   Lab Units 23  0736 07/10/23  1206 23  0641 23  0720   SODIUM mmol/L 134* 134* 138 139    POTASSIUM mmol/L 4.5 4.4 4.0 4.1   CHLORIDE mmol/L 107 106 106 108*   CO2 mmol/L 17.0* 18.0* 19.0* 18.8*   BUN mg/dL 33* 32* 37* 39*   CREATININE mg/dL 1.12 1.09 1.17 1.07   GLUCOSE mg/dL 112* 117* 113* 128*     Estimated Creatinine Clearance: 53 mL/min (by C-G formula based on SCr of 1.12 mg/dL).  Results from last 7 days   Lab Units 07/11/23  0736 07/06/23  1035 07/06/23  0644   ALBUMIN g/dL 1.6* 2.4* 2.7*   BILIRUBIN mg/dL 0.4  --  0.4   ALK PHOS U/L 242*  --  133*   AST (SGOT) U/L 102*  --  10   ALT (SGPT) U/L 110*  --  14       Results from last 7 days   Lab Units 07/11/23  0736 07/10/23  1206 07/09/23  0641 07/08/23  0720 07/07/23  0735 07/06/23  1035 07/06/23  0644   CALCIUM mg/dL 9.5 9.6 9.7 9.6   < >  --  9.4   ALBUMIN g/dL 1.6*  --   --   --   --  2.4* 2.7*   MAGNESIUM mg/dL  --   --   --   --   --   --  2.3   PHOSPHORUS mg/dL  --   --   --   --   --   --  3.8    < > = values in this interval not displayed.           No results found for: COVID19  No results found for: HGBA1C, POCGLU      XR Chest 1 View  X-RAY CHEST ONE VIEW     HISTORY: 73-year-old male status post CT-guided biopsy earlier today.     FINDINGS: There are innumerable pulmonary nodules in both lungs.  There  is no evidence for a pneumothorax. No new abnormality is seen.     This report was finalized on 7/11/2023 7:29 AM by Dr. Aura Womack M.D.     XR Knee 3 View Right  X-RAY KNEE THREE-VIEW RIGHT     HISTORY: 73-year-old male with pain and swelling of the right knee.     FINDINGS: There is likely a sizable suprapatellar joint effusion and  there may also be soft tissue swelling at the overlying soft tissues.  There are mild-moderate osteoarthritic changes, most prominent at the  lateral tibiofemoral compartment and there is chondrocalcinosis.     This report was finalized on 7/11/2023 7:27 AM by Dr. Aura Womack M.D.       I reviewed the patient's daily medications.  Scheduled Medications  dexamethasone, 4 mg, Oral, Q6H  mesalamine, 1.2 g,  Oral, Daily With Breakfast  methocarbamol, 1,000 mg, Oral, Q6H  metoprolol tartrate, 50 mg, Oral, Q8H  pancrelipase (Lip-Prot-Amyl), 12,000 units of lipase, Oral, TID With Meals  pantoprazole, 40 mg, Oral, Q AM  polyethylene glycol, 17 g, Oral, BID  pregabalin, 50 mg, Oral, Q12H  senna-docusate sodium, 2 tablet, Oral, BID  sodium chloride, 10 mL, Intravenous, Q12H  traZODone, 50 mg, Oral, Nightly    Infusions  HYDROmorphone HCl-NaCl,   lactated ringers, 75 mL/hr, Last Rate: 75 mL/hr (07/12/23 1150)  sodium chloride, 30 mL/hr  sodium chloride, 30 mL/hr    Diet  Diet: Regular/House Diet; Texture: Regular Texture (IDDSI 7); Fluid Consistency: Thin (IDDSI 0)         I have personally reviewed:  [x]  Laboratory   [x]  Microbiology   [x]  Radiology   [x]  EKG/Telemetry   []  Cardiology/Vascular   []  Pathology   []  Records     Assessment/Plan     Active Hospital Problems    Diagnosis  POA    **Postoperative back pain [G89.18, M54.9]  Yes    Status post kyphoplasty [Z98.890]  Not Applicable    Tachycardia [R00.0]  Yes    History of esophageal dysphagia [Z87.898]  Yes    Leukocytosis [D72.829]  Yes    History of recent steroid use [Z92.241]  Not Applicable    Difficulty walking [R26.2]  Yes    Constipated [K59.00]  Yes    Compression fracture of L1 vertebra [S32.010A]  Yes    Impaired fasting glucose [R73.01]  Yes    History of aortic valve replacement [Z95.2]  Not Applicable    S/P CABG (coronary artery bypass graft) [Z95.1]  Not Applicable    Chronic renal insufficiency, stage III (moderate) [N18.30]  Yes    Benign essential hypertension [I10]  Yes      Resolved Hospital Problems   No resolved problems to display.       73 y.o. male admitted with Postoperative back pain.    Goals of care-discussed CODE STATUS with patient and sister at bedside on 7/9.  Patient is now transition to comfort care on 7/11.  Stepdaughter, Mariam, voiced her concerns about the epidural not being able to be done.  Reached out to Dr. Ramirez and  he is going to come talk to the family again.  Brother at bedside voiced concerns that patient might be dehydrated.  Discussed that patient's previous labs do not support any kind of dehydration, and patient is currently getting IV fluids at a rate that we will keep him hydrated.      Metastatic cancer  Primary cancer unknown but suspected to be lung  Intractable back pain  -Continue PCA, Lyrica, steroids  -Acute pain consulted-they have discussed with neurosurgery and at this time it seems high risk to do an epidural.    Right knee effusion  -Ortho consulted, no procedures indicated at this time    Marked dilatation of the pancreatic duct  Chronic pancreatic ductal calculus  -Reviewed [place] records pancreatic ductal calculus is chronic  -CA 19-9 is actually decreased from previous been high as 150 at [place] in the past    Marked leukocytosis, stable  -Continues to downtrend without any antibiotics, procalcitonin 0.39  -Likely secondary to recent steroids as well as malignancy  -Continue to monitor off antibiotics  -Blood cultures with no growth  -No further labs    Coronary artery disease status post CABG  Hypertension  PVD  -Hold home Plavix and aspirin  -Continue home metoprolol, statin discontinued    Colitis-continue home mesalamine     ILD    CKD 3B, stable, baseline  Metabolic acidosis,improving  -Sodium bicarb discontinued      SCDs for DVT prophylaxis.  Full code.  Discussed with family and nursing staff.  Anticipate discharge to hospice scatter bed in the next few days.    Expected Discharge Date: 7/12/2023; Expected Discharge Time:       Manny Santo MD  [facility]  07/12/23  15:32 EDT

## 2023-07-13 NOTE — PROGRESS NOTES
Name: Bob Monahan ADMIT: 2023   : 1950  PCP: Tiarra Yi PA-C    MRN: [id number] LOS: 7 days   AGE/SEX: 73 y.o. male  ROOM: [facility]     Subjective   Subjective   Patient's pain is stable, he states it is no better or no worse.  Decreased appetite, poor p.o. intake.  Family at bedside.    Review of Systems  Back pain.  Right knee swelling, no tenderness.  No fevers, chills, chest pain, dyspnea, cough, abdominal pain, nausea, vomiting, dysuria.     Objective   Objective   Vital Signs  Temp:  [97.9 °F (36.6 °C)] 97.9 °F (36.6 °C)  Heart Rate:  [85-87] 85  Resp:  [18] 18  BP: (128-130)/(88-95) 130/95  SpO2:  [95 %] 95 %  on  Flow (L/min):  [3] 3;   Device (Oxygen Therapy): room air  Body mass index is 23.54 kg/m².  Physical Exam  Vitals and nursing note reviewed.   Constitutional:       General: He is not in acute distress.     Appearance: He is ill-appearing (Chronically).   Cardiovascular:      Rate and Rhythm: Regular rhythm. Tachycardia present.   Pulmonary:      Effort: Pulmonary effort is normal. No respiratory distress.   Abdominal:      General: Abdomen is flat. There is no distension.      Tenderness: There is no abdominal tenderness.   Musculoskeletal:         General: No swelling or deformity.      Comments: Right knee swelling.  No tenderness to palpation. lumbar tenderness   Skin:     General: Skin is warm and dry.      Coloration: Skin is not jaundiced.   Neurological:      General: No focal deficit present.      Mental Status: He is alert. Mental status is at baseline.       Results Review     I reviewed the patient's new clinical results.  Results from last 7 days   Lab Units 23  0736 07/10/23  1206 23  0720 23  0735   WBC 10*3/mm3 18.15* 19.20* 21.87* 22.06*   HEMOGLOBIN g/dL 10.9* 12.8* 11.3* 13.0   PLATELETS 10*3/mm3 208 203 197 190       Results from last 7 days   Lab Units 23  0736 07/10/23  1206 23  0641 23  0720   SODIUM mmol/L 134* 134*  138 139   POTASSIUM mmol/L 4.5 4.4 4.0 4.1   CHLORIDE mmol/L 107 106 106 108*   CO2 mmol/L 17.0* 18.0* 19.0* 18.8*   BUN mg/dL 33* 32* 37* 39*   CREATININE mg/dL 1.12 1.09 1.17 1.07   GLUCOSE mg/dL 112* 117* 113* 128*     Estimated Creatinine Clearance: 52.5 mL/min (by C-G formula based on SCr of 1.12 mg/dL).  Results from last 7 days   Lab Units 07/11/23  0736   ALBUMIN g/dL 1.6*   BILIRUBIN mg/dL 0.4   ALK PHOS U/L 242*   AST (SGOT) U/L 102*   ALT (SGPT) U/L 110*       Results from last 7 days   Lab Units 07/11/23  0736 07/10/23  1206 07/09/23  0641 07/08/23  0720   CALCIUM mg/dL 9.5 9.6 9.7 9.6   ALBUMIN g/dL 1.6*  --   --   --            No results found for: COVID19  No results found for: HGBA1C, POCGLU      XR Chest 1 View  X-RAY CHEST ONE VIEW     HISTORY: 73-year-old male status post CT-guided biopsy earlier today.     FINDINGS: There are innumerable pulmonary nodules in both lungs.  There  is no evidence for a pneumothorax. No new abnormality is seen.     This report was finalized on 7/11/2023 7:29 AM by Dr. Aura Womack M.D.     XR Knee 3 View Right  X-RAY KNEE THREE-VIEW RIGHT     HISTORY: 73-year-old male with pain and swelling of the right knee.     FINDINGS: There is likely a sizable suprapatellar joint effusion and  there may also be soft tissue swelling at the overlying soft tissues.  There are mild-moderate osteoarthritic changes, most prominent at the  lateral tibiofemoral compartment and there is chondrocalcinosis.     This report was finalized on 7/11/2023 7:27 AM by Dr. Aura Womack M.D.       I reviewed the patient's daily medications.  Scheduled Medications  dexamethasone, 4 mg, Oral, Q6H  mesalamine, 1.2 g, Oral, Daily With Breakfast  methocarbamol, 1,000 mg, Oral, Q6H  metoprolol tartrate, 50 mg, Oral, Q8H  pancrelipase (Lip-Prot-Amyl), 12,000 units of lipase, Oral, TID With Meals  pantoprazole, 40 mg, Oral, Q AM  polyethylene glycol, 17 g, Oral, BID  pregabalin, 50 mg, Oral,  Q12H  senna-docusate sodium, 2 tablet, Oral, BID  sodium chloride, 10 mL, Intravenous, Q12H  traZODone, 50 mg, Oral, Nightly    Infusions  HYDROmorphone HCl-NaCl,   lactated ringers, 75 mL/hr, Last Rate: 75 mL/hr (07/13/23 0138)  sodium chloride, 30 mL/hr  sodium chloride, 30 mL/hr    Diet  Diet: Regular/House Diet; Texture: Regular Texture (IDDSI 7); Fluid Consistency: Thin (IDDSI 0)         I have personally reviewed:  [x]  Laboratory   [x]  Microbiology   [x]  Radiology   [x]  EKG/Telemetry   []  Cardiology/Vascular   []  Pathology   []  Records     Assessment/Plan     Active Hospital Problems    Diagnosis  POA    **Postoperative back pain [G89.18, M54.9]  Yes    Status post kyphoplasty [Z98.890]  Not Applicable    Tachycardia [R00.0]  Yes    History of esophageal dysphagia [Z87.898]  Yes    Leukocytosis [D72.829]  Yes    History of recent steroid use [Z92.241]  Not Applicable    Difficulty walking [R26.2]  Yes    Constipated [K59.00]  Yes    Compression fracture of L1 vertebra [S32.010A]  Yes    Impaired fasting glucose [R73.01]  Yes    History of aortic valve replacement [Z95.2]  Not Applicable    S/P CABG (coronary artery bypass graft) [Z95.1]  Not Applicable    Chronic renal insufficiency, stage III (moderate) [N18.30]  Yes    Benign essential hypertension [I10]  Yes      Resolved Hospital Problems   No resolved problems to display.       73 y.o. male admitted with Postoperative back pain.    Goals of care-discussed CODE STATUS with patient and sister at bedside on 7/9.  Patient is now transition to comfort care on 7/11.  Acute pain evaluated patient again at family request, there is concerned about increased bleeding risk because of tumor neovascularization, potential infection, intrathecal seeding of tumor cells.  Currently family does not want to proceed with epidural.  Did discuss pathology with the patient and family at bedside.  They would like to know if there is any possible treatment that would not  impair quality of life.  But do not want any treatment that would make his quality of life worse.  Discussed with palliative care.  Discussed with family PCA pump versus fentanyl patch with IV pushes, at this time they prefer to keep the PCA pump.  The basal rate has been increased.      Metastatic cancer  Primary cancer unknown but suspected to be lung  Intractable back pain  -Continue PCA, Lyrica, steroids    Right knee effusion  -Ortho consulted, no procedures indicated at this time    Marked dilatation of the pancreatic duct  Chronic pancreatic ductal calculus  -Reviewed [place] records pancreatic ductal calculus is chronic  -CA 19-9 is actually decreased from previous been high as 150 at [place] in the past    Marked leukocytosis, stable  -Continues to downtrend without any antibiotics, procalcitonin 0.39  -Likely secondary to recent steroids as well as malignancy  -Continue to monitor off antibiotics  -Blood cultures with no growth  -No further labs    Coronary artery disease status post CABG  Hypertension  PVD  -Hold home Plavix and aspirin  -Continue home metoprolol, statin discontinued    Colitis-continue home mesalamine     ILD    CKD 3B, stable, baseline  Metabolic acidosis,improving  -Sodium bicarb discontinued      SCDs for DVT prophylaxis.  Full code.  Discussed with family and nursing staff.  Anticipate discharge to hospice scatter bed in the next few days.    Expected Discharge Date: 7/15/2023; Expected Discharge Time:       Manny Santo MD  [facility]  07/13/23  14:51 EDT

## 2023-07-13 NOTE — PLAN OF CARE
Goal Outcome Evaluation:     Progress: no change    Pt more restless and irritable this shift. Compliant with medication regimen. NPO since midnight. Will continue with plan of care.

## 2023-07-13 NOTE — PROGRESS NOTES
[facility] INPATIENT PROGRESS NOTE    Length of Stay:  7 days    CHIEF COMPLAINT/REASON FOR VISIT:  Metastatic cancer, suspected lung origin    SUBJECTIVE: Afebrile. Normotensive. On 3L NC. Still with pain, at times worse than others.     OBJECTIVE:  Vitals:    07/12/23 0946 07/12/23 1415 07/12/23 1742 07/13/23 0430   BP: 118/87 130/94 128/88 130/95   BP Location:  Left arm  Left arm   Patient Position:  Lying  Lying   Pulse: 106 92 87 85   Resp:  20  18   Temp:  97.2 °F (36.2 °C)  97.9 °F (36.6 °C)   TempSrc:  Oral  Oral   SpO2:  94%  95%   Weight:    62.2 kg (137 lb 2 oz)   Height:             PHYSICAL EXAMINATION:   General:  No acute distress, awake, alert.   Skin:  Warm and dry, no visible rash  HEENT:  Normocephalic/atraumatic.    Chest:  Normal respiratory effort     DIAGNOSTIC DATA:  Results Review:     I reviewed the patient's new clinical results.    Results from last 7 days   Lab Units 07/11/23  0736   WBC 10*3/mm3 18.15*   HEMOGLOBIN g/dL 10.9*   HEMATOCRIT % 32.9*   PLATELETS 10*3/mm3 208     Lab Results   Component Value Date    NEUTROABS 15.54 (H) 07/11/2023     Results from last 7 days   Lab Units 07/11/23  0736   SODIUM mmol/L 134*   POTASSIUM mmol/L 4.5   CHLORIDE mmol/L 107   CO2 mmol/L 17.0*   BUN mg/dL 33*   CREATININE mg/dL 1.12   GLUCOSE mg/dL 112*   CALCIUM mg/dL 9.5     Results from last 7 days   Lab Units 07/10/23  0833   INR  1.40*               IMAGING:    CT Chest With Contrast Diagnostic (07/06/2023 09:27)  CT Abdomen Pelvis With Contrast (07/06/2023 09:27)      ASSESSMENT:  This is a 73 y.o. male with:     *Suspected metastatic disease, suspected lung primary:   Presented to [facility] ED on 7/5/2023 with worsening back pain and difficulty walking after L1 kyphoplasty procedure on 6/27/2023.  A bone biopsy performed during the procedure was negative for malignancy.   MRI thoracic and lumbar spine performed on 7/5/2023 noted findings concerning for  metastatic disease involving the thoracic and lumbar spines.  There is epidural tumor present in the lower thoracic region extending to L1 where it is most severe.  Tumor also extends into neural foramina at L1-L2 and to a lesser extent T12-L1.  All these are new since last imaging in early May 2023.  CT C/A/P with marked interval increase in homogeneous soft tissue seen within the left perihilar and posterior left lower lobes.  The soft tissue adjacent to the descending thoracic aorta measures approximately 4.4 x 2.6 cm.  Bulky mediastinal adenopathy.  Index precarinal lymph node measures up to 1.7 cm in short axis dimension.   Of note, patient follows with  with pulmonology for interstitial lung disease.  Bronchoscopy and biopsy of the left hilar mass on 5/31/2023.  The pathology was nondiagnostic for malignancy.  7/5/2023: Tumor markers show mildly elevated CA 19-9 (79.2) and normal CEA and PSA levels.  7/6/2023: Informed patient's wife that the imaging findings are highly concerning for metastatic disease, suspect lung primary.  Pulmonology has been consulted.  As prior bronchoscopic left hilar lung mass biopsy was nondiagnostic, will consult IR for possible L1 soft tissue lesion biopsy. IR plans to perform this procedure on Monday next week after holding ASA & Plavix.  7/7/2023: Patient remains in pain.  Spoke with his sister (retired RN) at bedside who state patient would not want aggressive measures.  She thinks patient would benefit from comfort care measures only.  She states that patient's wife (next of kin) has early signs of Alzheimer's dementia and is not able to make decisions for the patient.  Patient is currently groggy and unable to maintain a conversation.  However, briefly, he reports of at least getting the biopsy done.   Palliative care consulted  Plan MRI brain as well.  7/8/2023: Continues to be in pain.  Plan to start Dilaudid PCA pump noted.  Again spoke with patient, his wife and  daughter regarding ongoing illness.  Patient today categorically states that he wants the biopsy done.  After the biopsy report, he will make a decision whether he wants to receive treatment for that condition or not.  Continue to hold aspirin and Plavix.    7/9/2023: Persistent pain.  Has been pushing the Dilaudid PCA pump multiple times.  On Lyrica.  On Robaxin.  CT-guided lung biopsy performed on 7/10/2023.  Pathology with poorly differentiated non-small cell malignancy, likely poorly differentiated carcinoma with extensive necrosis. Ki67 55-60%. Additional tissue would be necessary to confirm a diagnosis.      *L1 compression fracture, s/p kyphoplasty: Continue pain control per primary     *Leukocytosis:   Likely reactive.  No further labs planned.     *Normocytic anemia    *Elevated transaminases  , , elevated from normal at 10 and 14 respectively  Bilirubin normal at 0.4, alkaline phosphatase elevated at 242    *Severe hypoalbuminemia  Last albumin 1.6, from 2.4, from 3.6    *Coronary artery disease s/p CABG: Antiplatelet agents held for biopsy.      *Interstitial lung disease: Follows with Dr. Tidwell pulmonology as outpatient     *CKD3: Hypertensive nephropathy.  Follows with nephrology as outpatient  Creatinine 1.12, from 1.09, from 1.17, from 1.07.  No further labs being checked.     *Pancreatic cyst/duct dilatation:   Follows with GI at [place].  Mildly elevated CA 19-9 (79.2) likely related to this.    *Right knee effusion  Orthopedics has evaluated.  Family declined arthrocentesis.     RECOMMENDATIONS:  Discussed the pathology report with his family at the bedside and explained that this is an aggressive malignancy. No need to get more tissue as there will be no treatment.   Continue pain management  The anesthesiology pain service has evaluated and it is not felt that epidural or intrathecal therapy is reasonable  With poor performance status, he is not a candidate for systemic therapy for  his malignancy.  I do not think that radiation is appropriate as a pain management modality as he is not able to get out of his bed due to severe pain and therefore would not be able to easily transport to radiation or get on the table for radiation.  Ultimately, continue comfort measures  I will sign off. Call us back if needed.     Blake Mcgovern MD

## 2023-07-13 NOTE — PLAN OF CARE
Problem: Adult Inpatient Plan of Care  Goal: Plan of Care Review  Outcome: Ongoing, Not Progressing  Flowsheets (Taken 7/13/2023 1656)  Progress: no change  Plan of Care Reviewed With:   patient   family  Goal: Patient-Specific Goal (Individualized)  Outcome: Ongoing, Not Progressing  Goal: Absence of Hospital-Acquired Illness or Injury  Outcome: Ongoing, Not Progressing  Intervention: Identify and Manage Fall Risk  Description: Perform standard risk assessment on admission using a validated tool or comprehensive approach appropriate to the patient; reassess fall risk frequently, with change in status or transfer to another level of care.  Communicate fall injury risk to interprofessional healthcare team.  Determine need for increased observation, equipment and environmental modification, such as low bed, signage and supportive, nonskid footwear.  Adjust safety measures to individual developmental age, stage and identified risk factors.  Reinforce the importance of safety and physical activity with patient and family.  Perform regular intentional rounding to assess need for position change, pain assessment and personal needs, including assistance with toileting.  Recent Flowsheet Documentation  Taken 7/13/2023 6623 by Constanza Monahan RN  Safety Promotion/Fall Prevention:   safety round/check completed   room organization consistent  Intervention: Prevent Skin Injury  Description: Perform a screening for skin injury risk, such as pressure or moisture associated skin damage on admission and at regular intervals throughout hospital stay.  Keep all areas of skin (especially folds) clean and dry.  Maintain adequate skin hydration.  Relieve and redistribute pressure and protect bony prominences; implement measures based on patient-specific risk factors.  Match turning and repositioning schedule to clinical condition.  Encourage weight shift frequently; assist with reposition if unable to complete  independently.  Float heels off bed; avoid pressure on the Achilles tendon.  Keep skin free from extended contact with medical devices.  Encourage functional activity and mobility, as early as tolerated.  Use aids (e.g., slide boards, mechanical lift) during transfer.  Recent Flowsheet Documentation  Taken 7/13/2023 0929 by Constanza Monahan RN  Skin Protection:   adhesive use limited   incontinence pads utilized   tubing/devices free from skin contact  Intervention: Prevent and Manage VTE (Venous Thromboembolism) Risk  Description: Assess for VTE (venous thromboembolism) risk.  Encourage and assist with early ambulation.  Initiate and maintain compression or other therapy, as indicated, based on identified risk in accordance with organizational protocol and provider order.  Encourage both active and passive leg exercises while in bed, if unable to ambulate.  Recent Flowsheet Documentation  Taken 7/13/2023 0929 by Constanza Monahan RN  Activity Management: bedrest  Range of Motion: active ROM (range of motion) encouraged  Intervention: Prevent Infection  Description: Maintain skin and mucous membrane integrity; promote hand, oral and pulmonary hygiene.  Optimize fluid balance, nutrition, sleep and glycemic control to maximize infection resistance.  Identify potential sources of infection early to prevent or mitigate progression of infection (e.g., wound, lines, devices).  Evaluate ongoing need for invasive devices; remove promptly when no longer indicated.  Recent Flowsheet Documentation  Taken 7/13/2023 1600 by Constanza Monahan RN  Infection Prevention:   hand hygiene promoted   personal protective equipment utilized   single patient room provided   visitors restricted/screened  Taken 7/13/2023 1000 by Constanza Monahan RN  Infection Prevention:   hand hygiene promoted   personal protective equipment utilized   single patient room provided  Taken 7/13/2023 0800 by Constanza Monahan RN  Infection  Prevention:   hand hygiene promoted   personal protective equipment utilized   single patient room provided   visitors restricted/screened  Goal: Optimal Comfort and Wellbeing  Outcome: Ongoing, Not Progressing  Intervention: Monitor Pain and Promote Comfort  Description: Assess pain level, treatment efficacy and patient response at regular intervals using a consistent pain scale.  Consider the presence and impact of preexisting chronic pain.  Encourage patient and caregiver involvement in pain assessment, interventions and safety measures.  Recent Flowsheet Documentation  Taken 7/13/2023 0929 by Constanza Monahan RN  Pain Management Interventions:   position adjusted   pillow support provided   pain pump in use  Intervention: Provide Person-Centered Care  Description: Use a family-focused approach to care.  Develop trust and rapport by proactively providing information, encouraging questions, addressing concerns and offering reassurance.  Acknowledge emotional response to hospitalization.  Recognize and utilize personal coping strategies.  Honor spiritual and cultural preferences.  Recent Flowsheet Documentation  Taken 7/13/2023 0929 by Constanza Monahan RN  Trust Relationship/Rapport:   care explained   choices provided   emotional support provided   questions encouraged   questions answered   empathic listening provided   reassurance provided   thoughts/feelings acknowledged  Goal: Readiness for Transition of Care  Outcome: Ongoing, Not Progressing   Goal Outcome Evaluation:  Plan of Care Reviewed With: patient, family        Progress: no change

## 2023-07-13 NOTE — PROGRESS NOTES
I spoke with Mr. Monahan today at his bedside.  He has metastatic cancer with spine metastases and epidural spread of tumor his thoracic and lumbar spine.  He has had an L1 kyphoplasty recently.  He complains of fairly severe axial pain but his leg pain is probably the worst.  There is been some consideration for placement of an epidural catheter for pain management.  I spoke with him at length about this and the multiple concerns I have about placement of a catheter at this point in time.  I am most concerned about the possibilities of increased bleeding risk due to tumor neovascularization however I am also concerned about the problems of potential infection with foreign body placement and intrathecal seeding of tumor cells.  I weigh this concern against how effective I think this treatment will be and for how long it would be helpful.  It is possible to could get reasonably good pain relief in his lower extremities however I think that the likelihood of relief of his axial pain is more dubious considering the extent of involvement.  I am also concerned about masking of cord compression.  I will speak with him and his son-in-law either later today or tomorrow however this point time I am reluctant to proceed with epidural or intrathecal catheters.
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
## 2023-07-14 NOTE — PROGRESS NOTES
[facility] GROUP INPATIENT PROGRESS NOTE    Length of Stay:  8 days    CHIEF COMPLAINT/REASON FOR VISIT:  Metastatic cancer, suspected lung origin    SUBJECTIVE: Afebrile. Normotensive. On 3L NC. Still with pain, at times worse than others.  He is moving his legs a little bit more and does feel like if his pain were better controlled he could get up out of bed.    OBJECTIVE:  Vitals:    07/12/23 1742 07/13/23 0430 07/14/23 0435 07/14/23 0612   BP: 128/88 130/95 116/84    BP Location:  Left arm Left arm    Patient Position:  Lying Lying    Pulse: 87 85 71    Resp:  18 18 18   Temp:  97.9 °F (36.6 °C) 97 °F (36.1 °C)    TempSrc:  Oral Oral    SpO2:  95% (!) 88%    Weight:  62.2 kg (137 lb 2 oz)  62.6 kg (138 lb 0.1 oz)   Height:             PHYSICAL EXAMINATION:   General:  No acute distress, awake, alert.   Skin:  Warm and dry, no visible rash  HEENT:  Normocephalic/atraumatic.    Chest:  Normal respiratory effort  Neurologic: He is moving both of his legs but they remain weak     DIAGNOSTIC DATA:  Results Review:     I reviewed the patient's new clinical results.    Results from last 7 days   Lab Units 07/11/23  0736   WBC 10*3/mm3 18.15*   HEMOGLOBIN g/dL 10.9*   HEMATOCRIT % 32.9*   PLATELETS 10*3/mm3 208     Lab Results   Component Value Date    NEUTROABS 15.54 (H) 07/11/2023     Results from last 7 days   Lab Units 07/11/23  0736   SODIUM mmol/L 134*   POTASSIUM mmol/L 4.5   CHLORIDE mmol/L 107   CO2 mmol/L 17.0*   BUN mg/dL 33*   CREATININE mg/dL 1.12   GLUCOSE mg/dL 112*   CALCIUM mg/dL 9.5     Results from last 7 days   Lab Units 07/10/23  0833   INR  1.40*               IMAGING:    CT Chest With Contrast Diagnostic (07/06/2023 09:27)  CT Abdomen Pelvis With Contrast (07/06/2023 09:27)      ASSESSMENT:  This is a 73 y.o. male with:     *Suspected metastatic disease, suspected lung primary:   Presented to [facility] ED on 7/5/2023 with worsening back pain and difficulty walking after L1  kyphoplasty procedure on 6/27/2023.  A bone biopsy performed during the procedure was negative for malignancy.   MRI thoracic and lumbar spine performed on 7/5/2023 noted findings concerning for metastatic disease involving the thoracic and lumbar spines.  There is epidural tumor present in the lower thoracic region extending to L1 where it is most severe.  Tumor also extends into neural foramina at L1-L2 and to a lesser extent T12-L1.  All these are new since last imaging in early May 2023.  CT C/A/P with marked interval increase in homogeneous soft tissue seen within the left perihilar and posterior left lower lobes.  The soft tissue adjacent to the descending thoracic aorta measures approximately 4.4 x 2.6 cm.  Bulky mediastinal adenopathy.  Index precarinal lymph node measures up to 1.7 cm in short axis dimension.   Of note, patient follows with  with pulmonology for interstitial lung disease.  Bronchoscopy and biopsy of the left hilar mass on 5/31/2023.  The pathology was nondiagnostic for malignancy.  7/5/2023: Tumor markers show mildly elevated CA 19-9 (79.2) and normal CEA and PSA levels.  7/6/2023: Informed patient's wife that the imaging findings are highly concerning for metastatic disease, suspect lung primary.  Pulmonology has been consulted.  As prior bronchoscopic left hilar lung mass biopsy was nondiagnostic, will consult IR for possible L1 soft tissue lesion biopsy. IR plans to perform this procedure on Monday next week after holding ASA & Plavix.  7/7/2023: Patient remains in pain.  Spoke with his sister (retired RN) at bedside who state patient would not want aggressive measures.  She thinks patient would benefit from comfort care measures only.  She states that patient's wife (next of kin) has early signs of Alzheimer's dementia and is not able to make decisions for the patient.  Patient is currently groggy and unable to maintain a conversation.  However, briefly, he reports of at least  getting the biopsy done.   Palliative care consulted  Plan MRI brain as well.  7/8/2023: Continues to be in pain.  Plan to start Dilaudid PCA pump noted.  Again spoke with patient, his wife and daughter regarding ongoing illness.  Patient today categorically states that he wants the biopsy done.  After the biopsy report, he will make a decision whether he wants to receive treatment for that condition or not.  Continue to hold aspirin and Plavix.    7/9/2023: Persistent pain.  Has been pushing the Dilaudid PCA pump multiple times.  On Lyrica.  On Robaxin.  CT-guided lung biopsy performed on 7/10/2023.  Pathology with poorly differentiated non-small cell malignancy, likely poorly differentiated carcinoma with extensive necrosis. Ki67 55-60%. Additional tissue would be necessary to confirm a diagnosis.      *L1 compression fracture, s/p kyphoplasty:      *Leukocytosis:   Likely reactive.  No further labs planned.     *Normocytic anemia    *Severe hypoalbuminemia  Last albumin 1.6, from 2.4, from 3.6    *Coronary artery disease s/p CABG: Antiplatelet agents held for biopsy.      *Interstitial lung disease: Follows with Dr. Tidwell pulmonology as outpatient     *CKD3: Hypertensive nephropathy.  Follows with nephrology as outpatient  Creatinine 1.12, from 1.09, from 1.17, from 1.07.    No further labs being checked.     *Pancreatic cyst/duct dilatation:   Follows with GI at [place].  Mildly elevated CA 19-9 (79.2) likely related to this.    *Right knee effusion  Orthopedics has evaluated.  Family declined arthrocentesis.     RECOMMENDATIONS/PLAN:  Long discussion with the patient, his family, pharmacy staff, and 4 [facility] staff today.  We will add Toradol 15 mg every 6 hours for additional anti-inflammatory effects.  We will start him on a fentanyl patch and hope to wean the Dilaudid.  There is an anesthesiologist family member present who is pushing for an intrathecal pain pump.  The anesthesiology service here has  evaluated him and it is not felt that epidural or intrathecal management is appropriate.  He is still pushing for this.  We will see if we can get an opinion from Dr. Prince with pain management.  The anesthesiologist family member states that he has communicated with other anesthesiologists around who would be willing to do this procedure based on what he has told them.  He suggested the possibility of transfer to the [facility].  It would certainly be reasonable for him to transfer if that is what they desire.  The family also requests radiation oncology consultation which is reasonable.  Previously, he was not able to transfer out of the bed to get onto the radiation table so this was not an option.  He thinks this may be an option now.  Consult requested.  From a medical oncology standpoint, pathology shows poorly differentiated non-small cell malignancy, potentially carcinoma with extensive necrosis.  Additional tissue would be necessary to better characterize histology and to send for molecular studies.  I do not think that another biopsy is reasonable as he is not a candidate for any therapy.  Molecular studies even if we had another biopsy sometimes cannot be requested even for a couple of weeks after the patient is discharged from the hospital depending on the insurance situation.  Even if they worsened quickly, results take a couple of weeks to return and will therefore not be helpful.  Immunotherapy is not an option as we do not have PD-L1 status and we do not administer immunotherapy as an inpatient.  Additionally, by itself, it does not work quickly and therefore is not an option for this reason.  I explained all of this to the family today.  I continue to recommend palliative care.  He is not a candidate for systemic therapy for his malignancy.  I do not think that obtaining more tissue will change that.    Discussed at length today with hospital staff, the patient's family.  60 minutes  taking care of him today.  Again, if they desire transfer to the [facility] for more aggressive interventional pain management this would be reasonable.    Blake Mcgovern MD

## 2023-07-14 NOTE — PLAN OF CARE
Problem: Adult Inpatient Plan of Care  Goal: Plan of Care Review  Outcome: Ongoing, Progressing  Flowsheets (Taken 7/14/2023 1000)  Progress: no change  Plan of Care Reviewed With: patient  Goal: Patient-Specific Goal (Individualized)  Outcome: Ongoing, Progressing  Goal: Absence of Hospital-Acquired Illness or Injury  Outcome: Ongoing, Progressing  Intervention: Identify and Manage Fall Risk  Recent Flowsheet Documentation  Taken 7/14/2023 0800 by Gabriela Cates RN  Safety Promotion/Fall Prevention:   safety round/check completed   room organization consistent   nonskid shoes/slippers when out of bed  Intervention: Prevent Skin Injury  Recent Flowsheet Documentation  Taken 7/14/2023 0800 by Gabriela Cates RN  Body Position: patient/family refused  Intervention: Prevent and Manage VTE (Venous Thromboembolism) Risk  Recent Flowsheet Documentation  Taken 7/14/2023 0800 by Gabriela Cates RN  Activity Management: bedrest  Goal: Optimal Comfort and Wellbeing  Outcome: Ongoing, Progressing  Goal: Readiness for Transition of Care  Outcome: Ongoing, Progressing     Problem: Fall Injury Risk  Goal: Absence of Fall and Fall-Related Injury  Outcome: Ongoing, Progressing  Intervention: Identify and Manage Contributors  Recent Flowsheet Documentation  Taken 7/14/2023 0800 by Gabriela Cates RN  Medication Review/Management: medications reviewed  Intervention: Promote Injury-Free Environment  Recent Flowsheet Documentation  Taken 7/14/2023 0800 by Gabriela Cates RN  Safety Promotion/Fall Prevention:   safety round/check completed   room organization consistent   nonskid shoes/slippers when out of bed     Problem: Skin Injury Risk Increased  Goal: Skin Health and Integrity  Outcome: Ongoing, Progressing  Intervention: Optimize Skin Protection  Recent Flowsheet Documentation  Taken 7/14/2023 0800 by Gabriela Cates RN  Head of Bed (HOB) Positioning: HOB elevated     Problem: Pain Acute  Goal: Acceptable Pain Control and Functional  Ability  Outcome: Ongoing, Progressing  Intervention: Prevent or Manage Pain  Recent Flowsheet Documentation  Taken 7/14/2023 0800 by Gabriela Cates RN  Medication Review/Management: medications reviewed   Goal Outcome Evaluation:  Plan of Care Reviewed With: patient        Progress: no change

## 2023-07-14 NOTE — PROGRESS NOTES
Name: Bob Monahan ADMIT: 2023   : 1950  PCP: Tiarra Yi PA-C    MRN: [id number] LOS: 8 days   AGE/SEX: 73 y.o. male  ROOM: [facility]     Subjective   Subjective   Minimal low back pain today.  Positive weakness of the right lower extremity.  Clear urine in the Yu catheter.  No abdominal pain.  No nausea or vomiting.  Tolerating p.o. well.    Review of Systems  Cardiovascular/respiratory.  No chest pain.  No shortness of breath.  No cough.     Objective   Objective   Vital Signs  Temp:  [97 °F (36.1 °C)] 97 °F (36.1 °C)  Heart Rate:  [71] 71  Resp:  [18] 18  BP: (116)/(84) 116/84  SpO2:  [88 %] 88 %  on   ;   Device (Oxygen Therapy): room air    Intake/Output Summary (Last 24 hours) at 2023 1229  Last data filed at 2023 0435  Gross per 24 hour   Intake 29 ml   Output 3000 ml   Net -2971 ml     Body mass index is 23.69 kg/m².      23  0434 23  0430 23  0612   Weight: 62.9 kg (138 lb 10.7 oz) 62.2 kg (137 lb 2 oz) 62.6 kg (138 lb 0.1 oz)     Physical Exam  General.  Elderly gentleman.  Alert and oriented x3.  No apparent pain/distress/diaphoresis.  Normal mood and affect  Eyes.  Pupils equal round and reactive.  Intact extraocular musculature.  No pallor or jaundice  Oral cavity.  Moist mucous membrane.  Neck.  Supple.  No JVD.  No lymphadenopathy or thyromegaly.  Cardiovascular.  Regular rate and rhythm with a grade 2 systolic murmur.  Chest.  Poor bilateral air entry with no added sounds.  Abdomen.  Soft lax.  No tenderness.  No organomegaly.  No guarding or rebound.  Extremities.  No clubbing/cyanosis/edema.  Extremities..  Positive grade 4/5 weakness of the right lower extremity.    Results Review:      Results from last 7 days   Lab Units 23  0736 07/10/23  1206 23  0641 23  0720   SODIUM mmol/L 134* 134* 138 139   POTASSIUM mmol/L 4.5 4.4 4.0 4.1   CHLORIDE mmol/L 107 106 106 108*   CO2 mmol/L 17.0* 18.0* 19.0* 18.8*   BUN mg/dL 33* 32* 37*  39*   CREATININE mg/dL 1.12 1.09 1.17 1.07   GLUCOSE mg/dL 112* 117* 113* 128*   CALCIUM mg/dL 9.5 9.6 9.7 9.6   AST (SGOT) U/L 102*  --   --   --    ALT (SGPT) U/L 110*  --   --   --      Estimated Creatinine Clearance: 52.8 mL/min (by C-G formula based on SCr of 1.12 mg/dL).                            Invalid input(s):  PHOS        Invalid input(s): LDLCALC  Results from last 7 days   Lab Units 07/11/23  0736 07/10/23  1206 07/08/23  0720   WBC 10*3/mm3 18.15* 19.20* 21.87*   HEMOGLOBIN g/dL 10.9* 12.8* 11.3*   HEMATOCRIT % 32.9* 38.3 32.7*   PLATELETS 10*3/mm3 208 203 197   MCV fL 93.7 91.8 89.1   MCH pg 31.1 30.7 30.8   MCHC g/dL 33.1 33.4 34.6   RDW % 12.9 13.1 13.2   RDW-SD fl 43.9 44.1 42.9   MPV fL 9.0 9.5 9.2   NEUTROS ABS 10*3/mm3 15.54*  --  16.58*   EOS ABS 10*3/mm3 0.94*  --  3.54*   BASOS ABS 10*3/mm3 0.18  --   --    NRBC /100 WBC 0.0  --  0.0     Results from last 7 days   Lab Units 07/10/23  0833   INR  1.40*             Results from last 7 days   Lab Units 07/10/23  1513 07/10/23  1206   SED RATE mm/hr  --  77*   CRP mg/dL 33.23*  --                            Imaging:  Imaging Results (Last 24 Hours)       ** No results found for the last 24 hours. **               I reviewed the patient's new clinical results / labs / tests / procedures      Assessment/Plan     Active Hospital Problems    Diagnosis  POA    **Postoperative back pain [G89.18, M54.9]  Yes    Status post kyphoplasty [Z98.890]  Not Applicable    Tachycardia [R00.0]  Yes    History of esophageal dysphagia [Z87.898]  Yes    Leukocytosis [D72.829]  Yes    History of recent steroid use [Z92.241]  Not Applicable    Difficulty walking [R26.2]  Yes    Constipated [K59.00]  Yes    Compression fracture of L1 vertebra [S32.010A]  Yes    Impaired fasting glucose [R73.01]  Yes    History of aortic valve replacement [Z95.2]  Not Applicable    S/P CABG (coronary artery bypass graft) [Z95.1]  Not Applicable    Chronic renal insufficiency, stage III  (moderate) [N18.30]  Yes    Benign essential hypertension [I10]  Yes      Resolved Hospital Problems   No resolved problems to display.           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  Bronchoscopy pathology noted.  Status post recent kyphoplasty because of vertebral fracture.  Currently on PCA pump for pain control together with Decadron/Robaxin/Lyrica.  Hematology oncology does not believe patient is a candidate for any more chemotherapy.  Radiation oncology consulted for palliative radiation treatment.  Pain clinic/anesthesia consulted for consideration of intrathecal pain pump.-Toradol.  Chronic pancreatic duct stone dilatation of the pancreatic duct with elevated liver function test.  Benign GI examination.  Tolerating p.o. well.  Leukocytosis.  No clinical evidence of infection.  Mostly steroid and carcinoma in the wrist.  Blood cultures are negative.  No antibiotics.  Coronary artery disease/hypertension.  Aspirin/Plavix DC'd secondary to the patient high risk of bleed.  Continue metoprolol.  Dytan-D CHF the patient now is in palliative.  History of peripheral vascular disease.  Aspirin/Plavix/statin DC'd.  History of colitis on mesalamine.  Benign GI examination.  History of stage IIIb chronic renal failure and metabolic acidosis.  Patient appears euvolemic.  History of interstitial lung disease.  Palliative care.  Patient now in palliative care for symptomatic treatment.    Discussed my findings and plan of treatment with the patient and family.  Patient with poor prognosis and limited functional capacity.  Patient is a DO NOT RESUSCITATE at this time and agreeable for palliative care.        Crystal Caraballo MD  [facility]  07/14/23  12:29 EDT

## 2023-07-15 NOTE — PROGRESS NOTES
[facility] GROUP INPATIENT PROGRESS NOTE    Length of Stay:  9 days    CHIEF COMPLAINT/REASON FOR VISIT:  Metastatic cancer, suspected lung origin    SUBJECTIVE: Afebrile.  Normotensive to mildly hypertensive.  On room air.  He was short of breath earlier but this resolved.  He states his pain is overall much better.    OBJECTIVE:  Vitals:    07/14/23 0435 07/14/23 0612 07/14/23 1403 07/15/23 0420   BP: 116/84  154/88 154/86   BP Location: Left arm  Right arm Left arm   Patient Position: Lying  Lying Lying   Pulse: 71  81 81   Resp: 18 18 18    Temp: 97 °F (36.1 °C)  97 °F (36.1 °C) 97.2 °F (36.2 °C)   TempSrc: Oral  Oral Oral   SpO2: (!) 88%  93% 90%   Weight:  62.6 kg (138 lb 0.1 oz)     Height:             PHYSICAL EXAMINATION:   General:  No acute distress, awake, alert.  Appears comfortable today.  Skin:  Warm and dry, no visible rash  HEENT:  Normocephalic/atraumatic.    Chest:  Normal respiratory effort     DIAGNOSTIC DATA:  Results Review:     I reviewed the patient's new clinical results.    Results from last 7 days   Lab Units 07/11/23  0736   WBC 10*3/mm3 18.15*   HEMOGLOBIN g/dL 10.9*   HEMATOCRIT % 32.9*   PLATELETS 10*3/mm3 208     Lab Results   Component Value Date    NEUTROABS 15.54 (H) 07/11/2023     Results from last 7 days   Lab Units 07/11/23  0736   SODIUM mmol/L 134*   POTASSIUM mmol/L 4.5   CHLORIDE mmol/L 107   CO2 mmol/L 17.0*   BUN mg/dL 33*   CREATININE mg/dL 1.12   GLUCOSE mg/dL 112*   CALCIUM mg/dL 9.5     Results from last 7 days   Lab Units 07/10/23  0833   INR  1.40*               IMAGING: None reviewed    ASSESSMENT:  This is a 73 y.o. male with:     *Suspected metastatic disease, suspected lung primary:   Presented to [facility] ED on 7/5/2023 with worsening back pain and difficulty walking after L1 kyphoplasty procedure on 6/27/2023.  A bone biopsy performed during the procedure was negative for malignancy.   MRI thoracic and lumbar spine performed on  7/5/2023 noted findings concerning for metastatic disease involving the thoracic and lumbar spines.  There is epidural tumor present in the lower thoracic region extending to L1 where it is most severe.  Tumor also extends into neural foramina at L1-L2 and to a lesser extent T12-L1.  All these are new since last imaging in early May 2023.  CT C/A/P with marked interval increase in homogeneous soft tissue seen within the left perihilar and posterior left lower lobes.  The soft tissue adjacent to the descending thoracic aorta measures approximately 4.4 x 2.6 cm.  Bulky mediastinal adenopathy.  Index precarinal lymph node measures up to 1.7 cm in short axis dimension.   Of note, patient follows with  with pulmonology for interstitial lung disease.  Bronchoscopy and biopsy of the left hilar mass on 5/31/2023.  The pathology was nondiagnostic for malignancy.  7/5/2023: Tumor markers show mildly elevated CA 19-9 (79.2) and normal CEA and PSA levels.  7/6/2023: Informed patient's wife that the imaging findings are highly concerning for metastatic disease, suspect lung primary.  Pulmonology has been consulted.  As prior bronchoscopic left hilar lung mass biopsy was nondiagnostic, will consult IR for possible L1 soft tissue lesion biopsy. IR plans to perform this procedure on Monday next week after holding ASA & Plavix.  7/7/2023: Patient remains in pain.  Spoke with his sister (retired RN) at bedside who state patient would not want aggressive measures.  She thinks patient would benefit from comfort care measures only.  She states that patient's wife (next of kin) has early signs of Alzheimer's dementia and is not able to make decisions for the patient.  Patient is currently groggy and unable to maintain a conversation.  However, briefly, he reports of at least getting the biopsy done.   Palliative care consulted  Plan MRI brain as well.  7/8/2023: Continues to be in pain.  Plan to start Dilaudid PCA pump noted.   Again spoke with patient, his wife and daughter regarding ongoing illness.  Patient today categorically states that he wants the biopsy done.  After the biopsy report, he will make a decision whether he wants to receive treatment for that condition or not.  Continue to hold aspirin and Plavix.    7/9/2023: Persistent pain.  Has been pushing the Dilaudid PCA pump multiple times.  On Lyrica.  On Robaxin.  CT-guided lung biopsy performed on 7/10/2023.  Pathology with poorly differentiated non-small cell malignancy, likely poorly differentiated carcinoma with extensive necrosis. Ki67 55-60%. Additional tissue would be necessary to confirm a diagnosis.      *L1 compression fracture, s/p kyphoplasty:      *Leukocytosis:   Likely reactive.  No further labs planned.     *Normocytic anemia    *Severe hypoalbuminemia  Last albumin 1.6, from 2.4, from 3.6    *Coronary artery disease s/p CABG: Antiplatelet agents held for biopsy.      *Interstitial lung disease: Follows with Dr. Tidwell pulmonology as outpatient     *CKD3: Hypertensive nephropathy.  Follows with nephrology as outpatient  Creatinine 1.12, from 1.09, from 1.17, from 1.07.    No further labs being checked.     *Pancreatic cyst/duct dilatation:   Follows with GI at [place].  Mildly elevated CA 19-9 (79.2) likely related to this.    *Right knee effusion  Orthopedics has evaluated.  Family declined arthrocentesis.     RECOMMENDATIONS/PLAN:  On 7/14/2023 we added Toradol and a fentanyl patch with plans to stop the basal rate on the Dilaudid PCA today.  Family is concerned the pain may worsen as this happens, particularly throughout the night if he is not able to take as needed medicine frequently.  Hopefully the fentanyl patch will take care of most of the pain.  If not, I discussed with the nurse today that if his pain does worsen after the PCA basal rate is turned off that the basal rate can be resumed if needed.  On 7/14 the family was interested in reassessment by  interventional pain management regarding the possibility of an intrathecal pain pump.  They have been asked to evaluate.  The anesthesia service here has evaluated him and did not feel that he is appropriate for an epidural or intrathecal pain management.  The patient does not appear to be interested in this at this time.  We have asked radiation oncology to evaluate.  Dr. Carbajal with radiation oncology did see him.  He tells me today that he is not interested in radiation.  From a medical oncology standpoint, pathology shows poorly differentiated non-small cell malignancy, potentially carcinoma with extensive necrosis.  Additional tissue would be necessary to better characterize histology and to send for molecular studies.  I do not think that another biopsy is reasonable as he is not a candidate for any therapy.  Molecular studies even if we had another biopsy sometimes cannot be requested even for a couple of weeks after the patient is discharged from the hospital depending on the insurance situation.  Even if they worsened quickly, results take a couple of weeks to return and will therefore not be helpful.  Immunotherapy is not an option as we do not have PD-L1 status and we do not administer immunotherapy as an inpatient.  Additionally, by itself, it does not work quickly and therefore is not an option for this reason.  I explained all of this to the family today.  I continue to recommend palliative care.  He is not a candidate for systemic therapy for his malignancy.  I do not think that obtaining more tissue will change that.    Discussed with the patient and his family again today at the bedside.  Discussed with Dr. Carbajal with radiation oncology.    Blake Mcgovern MD

## 2023-07-15 NOTE — PROGRESS NOTES
Name: Bob Monahan ADMIT: 2023   : 1950  PCP: Tiarra Yi PA-C    MRN: [id number] LOS: 9 days   AGE/SEX: 73 y.o. male  ROOM: [facility]     Subjective   Subjective   Positive for occasional low back pain with movement.  Positive radiation of back pain to lower extremity.  No lower extremity weakness.   Clear urine in the Yu catheter.  No abdominal pain.  No nausea or vomiting.  Tolerating p.o. well.  No fever or chills.    Review of Systems  Cardiovascular/respiratory.  No chest pain.  No shortness of breath.  No cough.     Objective   Objective   Vital Signs  Temp:  [97.2 °F (36.2 °C)] 97.2 °F (36.2 °C)  Heart Rate:  [81] 81  BP: (154)/(86) 154/86  SpO2:  [90 %] 90 %  on  Flow (L/min):  [4] 4;   Device (Oxygen Therapy): nasal cannula    Intake/Output Summary (Last 24 hours) at 7/15/2023 1716  Last data filed at 7/15/2023 0305  Gross per 24 hour   Intake --   Output 1100 ml   Net -1100 ml       Body mass index is 23.69 kg/m².      23  0434 23  0430 23  0612   Weight: 62.9 kg (138 lb 10.7 oz) 62.2 kg (137 lb 2 oz) 62.6 kg (138 lb 0.1 oz)     Physical Exam  General.  Elderly gentleman.  Alert and oriented x3.  No apparent pain/distress/diaphoresis.  Normal mood and affect  Eyes.  Pupils equal round and reactive.  Intact extraocular musculature.  No pallor or jaundice  Oral cavity.  Moist mucous membrane.  Neck.  Supple.  No JVD.  No lymphadenopathy or thyromegaly.  Cardiovascular.  Regular rate and rhythm with a grade 2 systolic murmur.  Chest.  Poor bilateral air entry with no added sounds.  Abdomen.  Soft lax.  No tenderness.  No organomegaly.  No guarding or rebound.  Extremities.  No clubbing/cyanosis/edema.No lower extremity neurodeficits today.     Results Review:      Results from last 7 days   Lab Units 23  0736 07/10/23  1206 23  0641   SODIUM mmol/L 134* 134* 138   POTASSIUM mmol/L 4.5 4.4 4.0   CHLORIDE mmol/L 107 106 106   CO2 mmol/L 17.0* 18.0* 19.0*    BUN mg/dL 33* 32* 37*   CREATININE mg/dL 1.12 1.09 1.17   GLUCOSE mg/dL 112* 117* 113*   CALCIUM mg/dL 9.5 9.6 9.7   AST (SGOT) U/L 102*  --   --    ALT (SGPT) U/L 110*  --   --        Estimated Creatinine Clearance: 52.8 mL/min (by C-G formula based on SCr of 1.12 mg/dL).                            Invalid input(s):  PHOS        Invalid input(s): LDLCALC  Results from last 7 days   Lab Units 07/11/23  0736 07/10/23  1206   WBC 10*3/mm3 18.15* 19.20*   HEMOGLOBIN g/dL 10.9* 12.8*   HEMATOCRIT % 32.9* 38.3   PLATELETS 10*3/mm3 208 203   MCV fL 93.7 91.8   MCH pg 31.1 30.7   MCHC g/dL 33.1 33.4   RDW % 12.9 13.1   RDW-SD fl 43.9 44.1   MPV fL 9.0 9.5   NEUTROS ABS 10*3/mm3 15.54*  --    EOS ABS 10*3/mm3 0.94*  --    BASOS ABS 10*3/mm3 0.18  --    NRBC /100 WBC 0.0  --        Results from last 7 days   Lab Units 07/10/23  0833   INR  1.40*               Results from last 7 days   Lab Units 07/10/23  1513 07/10/23  1206   SED RATE mm/hr  --  77*   CRP mg/dL 33.23*  --                              Imaging:  Imaging Results (Last 24 Hours)       ** No results found for the last 24 hours. **               I reviewed the patient's new clinical results / labs / tests / procedures      Assessment/Plan     Active Hospital Problems    Diagnosis  POA    **Postoperative back pain [G89.18, M54.9]  Yes    Status post kyphoplasty [Z98.890]  Not Applicable    Tachycardia [R00.0]  Yes    History of esophageal dysphagia [Z87.898]  Yes    Leukocytosis [D72.829]  Yes    History of recent steroid use [Z92.241]  Not Applicable    Difficulty walking [R26.2]  Yes    Constipated [K59.00]  Yes    Compression fracture of L1 vertebra [S32.010A]  Yes    Impaired fasting glucose [R73.01]  Yes    History of aortic valve replacement [Z95.2]  Not Applicable    S/P CABG (coronary artery bypass graft) [Z95.1]  Not Applicable    Chronic renal insufficiency, stage III (moderate) [N18.30]  Yes    Benign essential hypertension [I10]  Yes      Resolved  Hospital Problems   No resolved problems to display.           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  Bronchoscopy pathology noted.  Primary mostly of the lung.  Status post recent kyphoplasty because of vertebral fracture.  Currently on PCA pump for pain control together with Decadron/Robaxin/Lyrica/Duragesic.  Pain is improving.  We will try to get the patient off of the Dilaudid pump by adding immediate release morphine to Duragesic/Robaxin/Lyrica/as needed Toradol.  We will start weaning of Decadron.  Hematology oncology does not believe patient is a candidate for any more chemotherapy.  Radiation oncology consulted for palliative radiation treatment.  Patient is leaning away from radiotherapy.    Chronic pancreatic duct stone dilatation of the pancreatic duct with elevated liver function test.  Benign GI examination.  Tolerating p.o. well.  Leukocytosis.  No clinical evidence of infection.  Mostly steroid and carcinoma in the wrist.  Blood cultures are negative.  No antibiotics.  Coronary artery disease/hypertension.  Aspirin/Plavix DC'd secondary to the patient high risk of bleed.  Continue metoprolol.  Good blood pressure control.  History of peripheral vascular disease.  Aspirin/Plavix/statin DC'd.  History of colitis on mesalamine.  Benign GI examination.  History of stage IIIb chronic renal failure and metabolic acidosis.  Patient appears euvolemic.  History of interstitial lung disease.  Palliative care.  Patient now in palliative care for symptomatic treatment.    Discussed my findings and plan of treatment with the patient and family.  Patient with poor prognosis and limited functional capacity.  Patient is a DO NOT RESUSCITATE at this time and in palliative care.        Crystal Caraballo MD  [facility]  07/15/23  17:16 EDT

## 2023-07-16 NOTE — PROGRESS NOTES
[facility] INPATIENT PROGRESS NOTE    Length of Stay:  10 days    CHIEF COMPLAINT/REASON FOR VISIT:  Metastatic cancer, suspected lung origin    SUBJECTIVE: Yesterday after a bath he was having more pain and agitation. More short of breath and having chest pain overnight and early this morning. Large BM overnight. Symptoms have improved this morning and he is comfortable.     OBJECTIVE:  Vitals:    07/14/23 1403 07/15/23 0420 07/15/23 1806 07/16/23 0520   BP: 154/88 154/86 145/82 100/70   BP Location: Right arm Left arm Left arm Left arm   Patient Position: Lying Lying  Lying   Pulse: 81 81 80 90   Resp: 18  18 20   Temp: 97 °F (36.1 °C) 97.2 °F (36.2 °C) 98.2 °F (36.8 °C) 96.9 °F (36.1 °C)   TempSrc: Oral Oral Oral Oral   SpO2: 93% 90% 92% (!) 84%   Weight:       Height:             PHYSICAL EXAMINATION:   General:  Comfortable, drowsy  HEENT:  Normocephalic/atraumatic.    Chest:  Normal respiratory effort     DIAGNOSTIC DATA:  Results Review:     I reviewed the patient's new clinical results.    Results from last 7 days   Lab Units 07/11/23  0736   WBC 10*3/mm3 18.15*   HEMOGLOBIN g/dL 10.9*   HEMATOCRIT % 32.9*   PLATELETS 10*3/mm3 208     Lab Results   Component Value Date    NEUTROABS 15.54 (H) 07/11/2023     Results from last 7 days   Lab Units 07/11/23  0736   SODIUM mmol/L 134*   POTASSIUM mmol/L 4.5   CHLORIDE mmol/L 107   CO2 mmol/L 17.0*   BUN mg/dL 33*   CREATININE mg/dL 1.12   GLUCOSE mg/dL 112*   CALCIUM mg/dL 9.5     Results from last 7 days   Lab Units 07/10/23  0833   INR  1.40*               IMAGING: None reviewed    ASSESSMENT:  This is a 73 y.o. male with:     *Suspected metastatic disease, suspected lung primary:   Presented to [facility] ED on 7/5/2023 with worsening back pain and difficulty walking after L1 kyphoplasty procedure on 6/27/2023.  A bone biopsy performed during the procedure was negative for malignancy.   MRI thoracic and lumbar spine performed on  7/5/2023 noted findings concerning for metastatic disease involving the thoracic and lumbar spines.  There is epidural tumor present in the lower thoracic region extending to L1 where it is most severe.  Tumor also extends into neural foramina at L1-L2 and to a lesser extent T12-L1.  All these are new since last imaging in early May 2023.  CT C/A/P with marked interval increase in homogeneous soft tissue seen within the left perihilar and posterior left lower lobes.  The soft tissue adjacent to the descending thoracic aorta measures approximately 4.4 x 2.6 cm.  Bulky mediastinal adenopathy.  Index precarinal lymph node measures up to 1.7 cm in short axis dimension.   Of note, patient follows with  with pulmonology for interstitial lung disease.  Bronchoscopy and biopsy of the left hilar mass on 5/31/2023.  The pathology was nondiagnostic for malignancy.  7/5/2023: Tumor markers show mildly elevated CA 19-9 (79.2) and normal CEA and PSA levels.  7/6/2023: Informed patient's wife that the imaging findings are highly concerning for metastatic disease, suspect lung primary.  Pulmonology has been consulted.  As prior bronchoscopic left hilar lung mass biopsy was nondiagnostic, will consult IR for possible L1 soft tissue lesion biopsy. IR plans to perform this procedure on Monday next week after holding ASA & Plavix.  7/7/2023: Patient remains in pain.  Spoke with his sister (retired RN) at bedside who state patient would not want aggressive measures.  She thinks patient would benefit from comfort care measures only.  She states that patient's wife (next of kin) has early signs of Alzheimer's dementia and is not able to make decisions for the patient.  Patient is currently groggy and unable to maintain a conversation.  However, briefly, he reports of at least getting the biopsy done.   Palliative care consulted  Plan MRI brain as well.  7/8/2023: Continues to be in pain.  Plan to start Dilaudid PCA pump noted.   Again spoke with patient, his wife and daughter regarding ongoing illness.  Patient today categorically states that he wants the biopsy done.  After the biopsy report, he will make a decision whether he wants to receive treatment for that condition or not.  Continue to hold aspirin and Plavix.    7/9/2023: Persistent pain.  Has been pushing the Dilaudid PCA pump multiple times.  On Lyrica.  On Robaxin.  CT-guided lung biopsy performed on 7/10/2023.  Pathology with poorly differentiated non-small cell malignancy, likely poorly differentiated carcinoma with extensive necrosis. Ki67 55-60%. Additional tissue would be necessary to confirm a diagnosis.      *L1 compression fracture, s/p kyphoplasty:      *Leukocytosis:   Likely reactive.  No further labs planned.     *Normocytic anemia    *Severe hypoalbuminemia  Last albumin 1.6, from 2.4, from 3.6    *Coronary artery disease s/p CABG: Antiplatelet agents held for biopsy.      *Interstitial lung disease: Follows with Dr. Tidwell pulmonology as outpatient     *CKD3: Hypertensive nephropathy.  Follows with nephrology as outpatient  Creatinine 1.12, from 1.09, from 1.17, from 1.07.    No further labs being checked.     *Pancreatic cyst/duct dilatation:   Follows with GI at [facility].  Mildly elevated CA 19-9 (79.2) likely related to this.    *Right knee effusion  Orthopedics has evaluated.  Family declined arthrocentesis.     RECOMMENDATIONS/PLAN:  Full comfort measures at this point. Discussed with two sisters at the bedside today. Other family members have been more insistent on other methods of pain management over the past few days, but at this point he has IV pain meds and anxiolytics in place and is comfortable. The basal PCA rate is off and he has a fentanyl patch in place. He's not really able to use the PCA at this point, but has other prn meds ordered that the nursing staff can administer.  No radiation is desired. I'll alert Dr. Carbajal.    Discussed with the  patient and two sisters today at the bedside. Discussed with his nurse. We will sign off.    Blake Mcgovern MD

## 2023-07-16 NOTE — PLAN OF CARE
Problem: Adult Inpatient Plan of Care  Goal: Plan of Care Review  7/15/2023 2040 by Rachael Bledsoe, RN  Outcome: Ongoing, Progressing  Flowsheets (Taken 7/15/2023 1825)  Progress: no change  Plan of Care Reviewed With:   patient   family  Outcome Evaluation: recieved care of patient at 1500. pt is in alot of pain. PCA in place with dilaudid, pt started on morphine MSIR and Morphine sublingual. and patient started to have a panic attack and O2 dropped. called MD to get anxiety meds. keller and oral care done, family at bedside. night shift RN taking over care and getting ativan for patient.     Problem: Adult Inpatient Plan of Care  Goal: Patient-Specific Goal (Individualized)  7/15/2023 2040 by Rachael Bledsoe, RN  Outcome: Ongoing, Progressing  Flowsheets (Taken 7/15/2023 1825)  Patient-Specific Goals (Include Timeframe): to keep comfortable  Individualized Care Needs: PRN and scheduled meds, PCA, keller

## 2023-07-16 NOTE — PROGRESS NOTES
Name: Bob Monahan ADMIT: 2023   : 1950  PCP: Tiarra Yi PA-C    MRN: [id number] LOS: 10 days   AGE/SEX: 73 y.o. male  ROOM: [facility]     Subjective   Subjective   No events overnight. I was called by the patient's RN earlier today because his pain regimen wasn't adequately controlling his pain during turns. For unclear reasons the patient had been made comfort care, but the palliative care order set hadn't been initiated and he was still receiving some other therapies. I discussed with his wife first by telephone and then in person as well as his daughter and they were very clear that he is to be comfort care only. I subsequently discontinued all of his previous medication and interventions that were not for comfort and started the palliative care order set.     The patient reportedly has been unconscious most of the day. He did wake when I was examining him, but he didn't respond to my questions and quickly went back to sleep.     Objective   Objective   Vital Signs  Temp:  [96.9 °F (36.1 °C)-98.2 °F (36.8 °C)] 96.9 °F (36.1 °C)  Heart Rate:  [80-90] 90  Resp:  [18-20] 20  BP: (100-145)/(70-82) 100/70  SpO2:  [84 %-92 %] 84 %  on  Flow (L/min):  [4-5] 5;   Device (Oxygen Therapy): nasal cannula  Body mass index is 23.69 kg/m².  Physical Exam  Constitutional:       General: He is not in acute distress.     Appearance: He is not toxic-appearing.   Cardiovascular:      Rate and Rhythm: Normal rate and regular rhythm.      Heart sounds: Normal heart sounds.   Pulmonary:      Effort: Pulmonary effort is normal.      Breath sounds: Normal breath sounds.   Abdominal:      General: Bowel sounds are normal.      Palpations: Abdomen is soft.   Musculoskeletal:         General: No tenderness.      Right lower leg: No edema.      Left lower leg: No edema.   Neurological:      Mental Status: He is lethargic.   Psychiatric:         Mood and Affect: Mood normal.         Behavior: Behavior normal.        Results Review     I reviewed the patient's new clinical results.  Results from last 7 days   Lab Units 07/11/23  0736 07/10/23  1206   WBC 10*3/mm3 18.15* 19.20*   HEMOGLOBIN g/dL 10.9* 12.8*   PLATELETS 10*3/mm3 208 203     Results from last 7 days   Lab Units 07/11/23  0736 07/10/23  1206   SODIUM mmol/L 134* 134*   POTASSIUM mmol/L 4.5 4.4   CHLORIDE mmol/L 107 106   CO2 mmol/L 17.0* 18.0*   BUN mg/dL 33* 32*   CREATININE mg/dL 1.12 1.09   GLUCOSE mg/dL 112* 117*   Estimated Creatinine Clearance: 52.8 mL/min (by C-G formula based on SCr of 1.12 mg/dL).  Results from last 7 days   Lab Units 07/11/23  0736   ALBUMIN g/dL 1.6*   BILIRUBIN mg/dL 0.4   ALK PHOS U/L 242*   AST (SGOT) U/L 102*   ALT (SGPT) U/L 110*     Results from last 7 days   Lab Units 07/11/23  0736 07/10/23  1206   CALCIUM mg/dL 9.5 9.6   ALBUMIN g/dL 1.6*  --      No results found for: COVID19  No results found for: HGBA1C, POCGLU    XR Chest 1 View  X-RAY CHEST ONE VIEW     HISTORY: 73-year-old male status post CT-guided biopsy earlier today.     FINDINGS: There are innumerable pulmonary nodules in both lungs.  There  is no evidence for a pneumothorax. No new abnormality is seen.     This report was finalized on 7/11/2023 7:29 AM by Dr. Aura Womack M.D.     XR Knee 3 View Right  X-RAY KNEE THREE-VIEW RIGHT     HISTORY: 73-year-old male with pain and swelling of the right knee.     FINDINGS: There is likely a sizable suprapatellar joint effusion and  there may also be soft tissue swelling at the overlying soft tissues.  There are mild-moderate osteoarthritic changes, most prominent at the  lateral tibiofemoral compartment and there is chondrocalcinosis.     This report was finalized on 7/11/2023 7:27 AM by Dr. Aura Womack M.D.       Scheduled Medications  pancrelipase (Lip-Prot-Amyl), 12,000 units of lipase, Oral, TID With Meals  pantoprazole, 40 mg, Oral, Q AM    Infusions   Diet  Diet: Regular/House Diet; Texture: Regular Texture (IDDSI  7); Fluid Consistency: Thin (IDDSI 0)       Assessment/Plan     Active Hospital Problems    Diagnosis  POA    **Postoperative back pain [G89.18, M54.9]  Yes    Status post kyphoplasty [Z98.890]  Not Applicable    Tachycardia [R00.0]  Yes    History of esophageal dysphagia [Z87.898]  Yes    Leukocytosis [D72.829]  Yes    History of recent steroid use [Z92.241]  Not Applicable    Difficulty walking [R26.2]  Yes    Constipated [K59.00]  Yes    Compression fracture of L1 vertebra [S32.010A]  Yes    Impaired fasting glucose [R73.01]  Yes    History of aortic valve replacement [Z95.2]  Not Applicable    S/P CABG (coronary artery bypass graft) [Z95.1]  Not Applicable    Chronic renal insufficiency, stage III (moderate) [N18.30]  Yes    Benign essential hypertension [I10]  Yes      Resolved Hospital Problems   No resolved problems to display.       73 y.o. male admitted with Postoperative back pain.    Suspected metastatic disease to the thoracic and lumbar spine with suspect lung primary-s/p bronchoscopy and biopsy of left hilar mass on 5/31/23 with pathology non-diagnostic for malignancy and s/p L1 kyphoplasty on 6/27/23 with bone biopsy negative for malignancy and s/p ct guided lung biopsy on 7/10/23 which showed poorly differentiated non-small cell malignancy (though it was felt that additional tissue would be needed to confirm the diagnosis). The patient's family has decided to pursue comfort care. I have stopped all not for comfort medications and labs and imaging and started the palliative care order set. Consult to hospice.   History of L1 compression fracture s/p kyphoplasty on 6/27/23  Coronary artery disease s/p cabg-on dapt as an outpatient   ILD-follows with Dr. Tidwell  CKD 3b-no longer checking labs  GERD-ppi  Chronic colitis-on mesalamine as an outpatient. Follows with [facility]  Pancreatic cystic/duct dilation-creon. Follows with [facility]  Right knee effusion-orthopedic surgery evaluated and family declined  arthrocentesis  DVT ppx is unnecessary  Code: comfort care only  Discussed with spouse, family, and nursing staff.  Anticipate discharge  Hospice scatter bed  once arrangements have been made.      Sotero Brock MD  [facility]  07/16/23  17:37 EDT    I wore protective equipment throughout this patient encounter including a face mask, gloves and protective eyewear.  Hand hygiene was performed before donning protective equipment and after removal when leaving the room.

## 2023-07-16 NOTE — PLAN OF CARE
"Goal Outcome Evaluation:  At shift change pt was complaining of SOA and chest pain after receiving SL morphine. On call physician paged for anxiety and agitation. 1 time dose of 0.5mg ativan was ordered and given. Pt called out shortly after and was still rating his pain \"a 10\" and was still visibly anxious and in pain even with his Dilaudid PCA in use.  On call physician called again and 0.5mg ativan q2 and 0.5mg dilaudid q2 ordered for breakthrough pain and given x3 overnight. Family and patient expressed frustration that full palliative order set was not on board. They reiterated that goals of care are comfort. On call physician made aware. Large BM overnight. Yu in place. 5Ls O2. Humidified added for comfort.Family at bedside overnight.  Will continue to monitor per palliative goals of care.                      "

## 2023-07-17 PROBLEM — C79.51 METASTASIS TO BONE: Status: ACTIVE | Noted: 2023-01-01

## 2023-07-17 PROBLEM — C34.90 MALIGNANT NEOPLASM OF BRONCHUS AND LUNG: Status: ACTIVE | Noted: 2023-01-01

## 2023-07-17 PROBLEM — C78.7 METASTASIS TO LIVER: Status: ACTIVE | Noted: 2023-01-01
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
## 2023-07-17 NOTE — CONSULTS
Visit with patient, spouse and 2 daughters.  Prayer offered for comfort dn peace of patient and his family. Patient has a large family that is very supportive of him and his immediate family. Family is aware that chaplains are available for continued support.

## 2023-07-17 NOTE — CONSULTS
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 Explanation of services provided with a focus on [facility] ([facility] Scattered [facility]). Answered all questions, discussed medications, symptom management needs, and goals of care. [facility] services selected. [facility] consent forms completed and signed by Amanda patel. [facility] information provided and encouraged to reach out with any needs.    Benefit change completed and copy left with Laura Arguelles CCP. [facility] daily visits will begin tomorrow 7/18/23.    Please call with any questions, concerns, or change in patient status. Thank you for allowing us the opportunity to participate in the care of this patient/family.    Nany Castaneda, RN, BSN  [facility] Admissions  [phone]

## 2023-07-17 NOTE — DISCHARGE SUMMARY
Patient Name: Bob Monahan  : 1950  MRN: [id number]    Date of Admission: 2023  Date of Discharge:  2023  Primary Care Physician: Tiarra Yi PA-C      Chief Complaint:   Back Pain (Back pain, surgery x1 week ago )      Discharge Diagnoses     Active Hospital Problems    Diagnosis  POA    **Postoperative back pain [G89.18, M54.9]  Yes    Status post kyphoplasty [Z98.890]  Not Applicable    Tachycardia [R00.0]  Yes    History of esophageal dysphagia [Z87.898]  Yes    Leukocytosis [D72.829]  Yes    History of recent steroid use [Z92.241]  Not Applicable    Difficulty walking [R26.2]  Yes    Constipated [K59.00]  Yes    Compression fracture of L1 vertebra [S32.010A]  Yes    Impaired fasting glucose [R73.01]  Yes    History of aortic valve replacement [Z95.2]  Not Applicable    S/P CABG (coronary artery bypass graft) [Z95.1]  Not Applicable    Chronic renal insufficiency, stage III (moderate) [N18.30]  Yes    Benign essential hypertension [I10]  Yes      Resolved Hospital Problems   No resolved problems to display.        Hospital Course     Mr. Monahan is a 73 y.o. male with a history of coronary artery disease s/p CABG, ILD and a perihilar mass recently biopsied in May (non-diagnostic for malignancy), CKD 3b, GERD, chronic colitis on mesalamine followed by [facility], a pancreatic cystic/duct dilation followed by [facility], recent L1 compression fracture s/p kyphoplasty on 23 who presented to [facility] initially complaining of increased back pain and difficulty walking.  Please see the admitting history and physical for further details.  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      Additional imaging showed a marked interval increase in homogenous soft tissue in the left perihilar and  posterior left lower lobes as well as bulky mediastinal adenopathy. He had actually undergone a biopsy of the hilar mass in May but the pathology was non-diagnostic for malignancy. The pathology from the kyphoplasty in late June was also negative for malignancy. He underwent CT guided lung biopsy on 7/10/23 which showed poorly differentiated non-small cell malignancy, though additional tissue was felt to be needed to obtain a specific diagnosis. He was not felt to be a candidate for chemotherapy by oncology and so no additional tissue sampling was pursued. Palliative care/hospice care was recommended and the family was agreeable. He was transferred to [facility] where all therapies not for comfort were stopped and the palliative care order set was initiated. Hospice has evaluated the patient and there are plans to transition to a hospice scatter bed today. Pain control has been difficult, and Dr. Fernandez with [facility] has recommended scheduling dilaudid 3 mg iv every 4 hours with dilaudid 3 mg iv q1h prn for break through.     Day of Discharge     Subjective:  No events overnight. Pain control has been difficult with movements. When I saw the patient he appeared comfortable after application of a fentanyl patch. The patient's family has signed the paperwork to transition to a hospice scatter bed.     Physical Exam:  Temp:  [98.3 °F (36.8 °C)-99.3 °F (37.4 °C)] 98.3 °F (36.8 °C)  Heart Rate:  [] 111  Resp:  [18] 18  Body mass index is 23.69 kg/m².  Physical Exam  Constitutional:       General: He is not in acute distress.     Appearance: He is ill-appearing. He is not toxic-appearing.   Cardiovascular:      Rate and Rhythm: Regular rhythm. Tachycardia present.      Heart sounds: Normal heart sounds.   Pulmonary:      Breath sounds: Rhonchi and rales present.   Abdominal:      General: Bowel sounds are normal. There is no distension.      Palpations: Abdomen is soft.      Tenderness: There is no abdominal tenderness.  There is no guarding or rebound.   Neurological:      Mental Status: He is unresponsive.       Consultants     Consult Orders (all) (From admission, onward)       Start     Ordered    07/16/23 1408  Consult to Pastoral/Spiritual Care  Once        Provider:  (Not yet assigned)    07/16/23 1408    07/16/23 1408  Consult [facility]  Once        Specialty:  Hospice and Palliative Medicine  Provider:  (Not yet assigned)    07/16/23 1408    07/16/23 1408  Inpatient consult to Case Management   Once        Provider:  (Not yet assigned)    07/16/23 1408    07/14/23 1142  Inpatient Pain Medicine Physician Consult  Once,   Status:  Canceled        Specialty:  Pain Medicine  Provider:  Bret Prince MD    07/14/23 1142    07/14/23 1109  Inpatient Anesthesia [facility] Consult  Once,   Status:  Canceled        Specialty:  Pain Medicine  Provider:  (Not yet assigned)    07/14/23 1110    07/14/23 1041  Inpatient Radiation Oncology Consult  Once        Specialty:  Radiation Oncology  Provider:  Julius Duran MD    07/14/23 1041    07/11/23 1017  Inpatient Hospice / [facility] Consult  Once        Specialty:  Hospice and Palliative Medicine  Provider:  (Not yet assigned)    07/11/23 1016    07/10/23 1131  Inpatient Orthopedic Surgery Consult  Once        Specialty:  Orthopedic Surgery  Provider:  David Wilson MD    07/10/23 1130    07/10/23 0909  Inpatient Anesthesia [facility] Management Clinic Consult  Once        Specialty:  Pain Medicine  Provider:  (Not yet assigned)    07/10/23 0908    07/09/23 0947  Inpatient Pain Medicine Physician Consult  Once,   Status:  Canceled        Specialty:  Pain Medicine  Provider:  (Not yet assigned)    07/09/23 0947    07/07/23 1105  Inpatient Palliative Care Team Consult  Once        Provider:  (Not yet assigned)    07/07/23 1105    07/06/23 1351  Inpatient Pulmonology Consult  Once        Specialty:  Pulmonary Disease  Provider:  King Lopez MD    07/06/23  1350    07/06/23 1349  Inpatient Pulmonology Consult  Once,   Status:  Canceled        Specialty:  Pulmonary Disease  Provider:  (Not yet assigned)    07/06/23 1348    07/05/23 2040  Hematology & Oncology Inpatient Consult  Once        Specialty:  Hematology and Oncology  Provider:  Blake Mcgovern MD    07/05/23 2040 07/05/23 1752  Inpatient Spiritual Care Consult  Once        Provider:  (Not yet assigned)    07/05/23 1754    07/05/23 1751  Inpatient Consult to Case Management   Once        Provider:  (Not yet assigned)    07/05/23 1754    07/05/23 1446  [facility] (on-call MD unless specified) Details  Once,   Status:  Canceled        Specialty:  Hospitalist  Provider:  (Not yet assigned)    07/05/23 1446    07/05/23 1155  Neurosurgery (on-call MD unless specified)  Once        Specialty:  Neurosurgery  Provider:  (Not yet assigned)    07/05/23 1154    Signed and Held  Inpatient consult to Case Management   Once        Provider:  (Not yet assigned)    Signed and Held                  Procedures     Imaging Results (All)       Procedure Component Value Units Date/Time    XR Chest 1 View [[id number]] Collected: 07/10/23 2006     Updated: 07/11/23 0732    Narrative:      X-RAY CHEST ONE VIEW     HISTORY: 73-year-old male status post CT-guided biopsy earlier today.     FINDINGS: There are innumerable pulmonary nodules in both lungs.  There  is no evidence for a pneumothorax. No new abnormality is seen.     This report was finalized on 7/11/2023 7:29 AM by Dr. Aura Womack M.D.       XR Knee 3 View Right [[id number]] Collected: 07/10/23 1944     Updated: 07/11/23 0730    Narrative:      X-RAY KNEE THREE-VIEW RIGHT     HISTORY: 73-year-old male with pain and swelling of the right knee.     FINDINGS: There is likely a sizable suprapatellar joint effusion and  there may also be soft tissue swelling at the overlying soft tissues.  There are mild-moderate osteoarthritic changes, most prominent at  the  lateral tibiofemoral compartment and there is chondrocalcinosis.     This report was finalized on 7/11/2023 7:27 AM by Dr. Aura Womack M.D.       XR Chest 1 View [[id number]] Collected: 07/10/23 1552     Updated: 07/10/23 1757    Narrative:      CHEST RADIOGRAPH     HISTORY: 1 hour post biopsy, hypoxia     COMPARISON: Preceding biopsy.     FINDINGS/    Impression:      Single AP view of the chest was obtained. No evident pneumothorax. No  large pleural effusions. Innumerable bilateral pulmonary nodules.  Interstitial prominence. Redemonstrated left lower lobe mass. Unchanged  appearing cardiomediastinal contours. No acute osseous abnormalities.     This report was finalized on 7/10/2023 5:54 PM by Dr. Fabian Menchaca M.D.       CT Needle Biopsy Lung [[id number]] Collected: 07/10/23 1620     Updated: 07/10/23 1705    Narrative:      CT GUIDED LUNG BIOPSY     HISTORY:  lung lesion biopsy. Suspected mets.     COMPARISON: CT 07/06/2023     FINDINGS:   After informed written consent was obtained with a witness present, the  patient was placed in RPO position. A planning CT scan was performed and  an approach chosen. Of note, there appeared to be further increase of  the left perihilar/posterior lobe mass and the large adjacent nodule  which now appeared confluent. Developing confluence of a group of  nodules in the right upper lobe with some adjacent groundglass opacity.  Suspected additional increase of some of the other innumerable bilateral  pulmonary nodules. Findings discussed with Dr. Mcgovern and Dr. Santo after  the procedure. The skin was prepped and draped in the usual sterile  fashion. A nurse was continuously present for monitoring. 1% lidocaine  solution was injected into the soft tissues for local anesthesia. Under  CT guidance, a 19-gauge introducer needle was advanced into the  posterior left lower lobe mass. A total of 4 passes were performed using  a 20-gauge core biopsy needle with appropriate appearing  tissue  specimens placed in formalin. The introducer needle was then removed.  Manual pressure was held. A sterile dressing was applied. The patient  tolerated the procedure well. There were no immediate complications. All  elements of maximal sterile technique were followed. Radiation dose  reduction techniques were utilized, including automated exposure control  and exposure modulation based on body size.          Impression:      Successful right lower lobe mass biopsy, see subsequent pathology  report. Apparent tumor progression as described above. Superimposed  inflammatory/infectious component in right upper lobe not excluded.     This report was finalized on 7/10/2023 5:02 PM by Dr. Fbaian Menchaca M.D.       CT Abdomen Pelvis With Contrast [[id number]] Collected: 07/06/23 1148     Updated: 07/07/23 1137    Narrative:      CT CHEST, ABDOMEN AND PELVIS WITH CONTRAST     HISTORY: 73-year-old male with evaluation for metastatic disease.     TECHNIQUE: Axial CT images of the chest abdomen and pelvis were obtained  following administration of intravenous and oral contrast. Coronal and  sagittal reconstructions were then obtained.     COMPARISON: CT chest dated 05/05/2023     FINDINGS:     CT CHEST: No pathological axillary lymphadenopathy. Changes of median  sternotomy with aortic valve replacement are present. Calcified coronary  artery disease is present.     There is bulky mediastinal lymphadenopathy. Index precarinal lymph node  measures up to 1.7 cm in short axis dimension. Prevascular lymph node  measures up to 1.6 cm in short axis dimension. Subcarinal  lymphadenopathy measuring up to 2.3 cm in short axis dimension. In  addition there is marked bilateral hilar lymphadenopathy. Marked  interval increase in homogeneous soft tissue seen within the left  perihilar and posterior left lower lobe. This soft tissue adjacent to  the descending thoracic aorta measures approximately 4.4 x 2.6 cm. This  is highly  concerning for a primary bronchogenic neoplasm. In addition  there are numerous scattered pulmonary nodules bilaterally in a pattern  consistent with metastatic disease. Dilated ascending thoracic aorta  measuring up to 3.8 cm. The patient's demonstrated thoracic metastases  is subtle on CT.     CT ABDOMEN AND PELVIS:Numerous hypoattenuating foci within both lobes of  the liver highly suspicious for metastatic disease. The spleen is  somewhat bulky. The gallbladder is distended and otherwise normal. There  is marked dilatation of the pancreatic duct within the body and tail of  the pancreas to the level of the large calcification within the head  region, mildly thickened appearance of the left adrenal gland is  unchanged. No renal calculi or hydronephrosis. Urinary bladder is  moderately distended and normal. Diverticulosis is present. No evidence  of bowel obstruction. An enlarged retrocrural lymph node measures up to  1.6 cm in short axis dimension. There is marked atherosclerotic  calcification within the abdominal aorta and its branches. Status post  kyphoplasty at L1 level where there is paravertebral soft tissue. This  has been better evaluated on the MR of the spine.       Impression:      1. A primary bronchogenic neoplasm is suspected within the left lower  lobe. Metastatic mediastinal, bilateral hilar and retrocrural  lymphadenopathy is identified. Additional bilateral pulmonary nodules  and hepatic metastatic disease is identified. The thoracic vertebral  body metastases are subtle on CT imaging. Percutaneous biopsy may be  performed from the liver lesions.  2. Marked dilatation of the pancreatic duct within the body and tail of  the pancreas leading up to calcification within the neck/head region.  This may represent an obstructing pancreatic duct calculus. Further  workup recommended.  3. Dilated ascending thoracic aorta.     Radiation dose reduction techniques were utilized, including  automated  exposure control and exposure modulation based on body size.     This report was finalized on 7/7/2023 11:33 AM by Dr. Vikki Ring M.D.       CT Chest With Contrast Diagnostic [[id number]] Collected: 07/06/23 1148     Updated: 07/07/23 1137    Narrative:      CT CHEST, ABDOMEN AND PELVIS WITH CONTRAST     HISTORY: 73-year-old male with evaluation for metastatic disease.     TECHNIQUE: Axial CT images of the chest abdomen and pelvis were obtained  following administration of intravenous and oral contrast. Coronal and  sagittal reconstructions were then obtained.     COMPARISON: CT chest dated 05/05/2023     FINDINGS:     CT CHEST: No pathological axillary lymphadenopathy. Changes of median  sternotomy with aortic valve replacement are present. Calcified coronary  artery disease is present.     There is bulky mediastinal lymphadenopathy. Index precarinal lymph node  measures up to 1.7 cm in short axis dimension. Prevascular lymph node  measures up to 1.6 cm in short axis dimension. Subcarinal  lymphadenopathy measuring up to 2.3 cm in short axis dimension. In  addition there is marked bilateral hilar lymphadenopathy. Marked  interval increase in homogeneous soft tissue seen within the left  perihilar and posterior left lower lobe. This soft tissue adjacent to  the descending thoracic aorta measures approximately 4.4 x 2.6 cm. This  is highly concerning for a primary bronchogenic neoplasm. In addition  there are numerous scattered pulmonary nodules bilaterally in a pattern  consistent with metastatic disease. Dilated ascending thoracic aorta  measuring up to 3.8 cm. The patient's demonstrated thoracic metastases  is subtle on CT.     CT ABDOMEN AND PELVIS:Numerous hypoattenuating foci within both lobes of  the liver highly suspicious for metastatic disease. The spleen is  somewhat bulky. The gallbladder is distended and otherwise normal. There  is marked dilatation of the pancreatic duct within the body  and tail of  the pancreas to the level of the large calcification within the head  region, mildly thickened appearance of the left adrenal gland is  unchanged. No renal calculi or hydronephrosis. Urinary bladder is  moderately distended and normal. Diverticulosis is present. No evidence  of bowel obstruction. An enlarged retrocrural lymph node measures up to  1.6 cm in short axis dimension. There is marked atherosclerotic  calcification within the abdominal aorta and its branches. Status post  kyphoplasty at L1 level where there is paravertebral soft tissue. This  has been better evaluated on the MR of the spine.       Impression:      1. A primary bronchogenic neoplasm is suspected within the left lower  lobe. Metastatic mediastinal, bilateral hilar and retrocrural  lymphadenopathy is identified. Additional bilateral pulmonary nodules  and hepatic metastatic disease is identified. The thoracic vertebral  body metastases are subtle on CT imaging. Percutaneous biopsy may be  performed from the liver lesions.  2. Marked dilatation of the pancreatic duct within the body and tail of  the pancreas leading up to calcification within the neck/head region.  This may represent an obstructing pancreatic duct calculus. Further  workup recommended.  3. Dilated ascending thoracic aorta.     Radiation dose reduction techniques were utilized, including automated  exposure control and exposure modulation based on body size.     This report was finalized on 7/7/2023 11:33 AM by Dr. Vikki Ring M.D.       MRI Lumbar Spine With & Without Contrast [[id number]] Collected: 07/05/23 1901     Updated: 07/06/23 0731    Narrative:      MRI EXAMINATION OF THE LUMBAR AND THORACIC SPINE WITH AND WITHOUT  CONTRAST     HISTORY: Kyphoplasty, back pain.     COMPARISON: MRI of the lumbar spine 05/05/2023.     FINDINGS:     MRI EXAMINATION OF THE LUMBAR SPINE WITHOUT CONTRAST:     The patient has undergone kyphoplasty at L1. There is moderate  loss of  disc height at L1 and mild bony retropulsion of the posterior body.  There is epidural soft tissue present posterior to the L1 vertebral body  with components that enhance and components that did not enhance. The  nonenhancing component is more prominent to the right of midline and  measures approximately 11 mm in the craniocaudal dimension and 6 mm in  the AP dimension. It is T2 hyperintense.     Multiple areas of T1 hypointensity are appreciated involving the lumbar  spine which are new versus the MRI examination of 05/05/2023. This  includes an area of signal loss involving the T11 vertebral body  measuring approximately 3.5 cm in size, the T12 vertebral body centrally  measuring 2.8 cm, the posterior lateral aspect of T12 to the left (1.9  cm, the lateral aspect of T12 to the right (15 mm)     The lateral aspect of L2 to the left (22 mm), the posterior aspect of L2  (12 mm), the anterior aspect of L2 inferiorly (7 mm), the L3 vertebral  body centrally (2 cm) the L4 vertebral body posteriorly and to the left  of midline (1 cm), the anterolateral aspect of L5 to the left (2.7 cm).  These areas are T2 hyperintense. After contrast administration there was  nonenhancement or minimal enhancement related to these areas. There is  epidural enhancement from T12 to L1 as well as enhancement of the cauda  equina. There is enhancing soft tissue laterally bilaterally at the  level of L1 more prominent to the left where there is involvement of the  medial aspect of the psoas muscle and enhancing tissue extending into  the neural foramen of L1-L2 to the left.     T12-L1: There is mild central canal stenosis. There is mild foraminal  stenosis on the left secondary to soft tissues extending into the neural  foramen which enhances.     L1-L2: There is severe central canal stenosis secondary to enhancing  epidural tissue anteriorly as well as laterally bilaterally and again,  enhancing tissue is appreciated extending into  the neural foramen  bilaterally.     L2-L3: There is mild canal stenosis secondary to a broad-based disc  osteophyte complex which is more prominent to the left. Mild foraminal  stenosis is present on the left.     L3-L4: There is mild canal stenosis secondary to a broad-based disc  osteophyte complex and to mild facet degenerative disease.     L4-L5: Mild-to-moderate facet degenerative disease and mild central disc  bulge is noted.     L5-S1: Moderate facet degenerative disease is present bilaterally.     MRI EXAMINATION OF THE THORACIC SPINE WITH AND WITHOUT CONTRAST:     FINDINGS: The alignment of the thoracic spine is within normal limits.  Signal intensity within the cord is normal on the sagittal T2 sequence.  Areas of signal loss are appreciated involving the T9, T10, T11 and T12  vertebral bodies. Areas of signal loss are also appreciated involving  the lateral aspects of the C7, T1 and T3 vertebral bodies to the left  and involving the T4 vertebral body centrally. There is T2 hyperintense  epidural soft tissue appreciated posterior to the T10 and to lesser  extent T11 vertebral bodies which enhance after contrast administration.  Signal intensity within the cord is normal on the sagittal and axial T2  sequences.     There is severe spinal stenosis present at L1 as described above. There  is moderate canal stenosis secondary to epidural enhancing tissue  beginning at T9/T10 and extending to T10/T11 and mild stenosis due to  epidural enhancing tissue to the left at T11.     Although limited by motion multiple nodular areas of increased signal  are appreciated involving the lungs bilaterally likely representing  diffuse metastatic disease. There is soft tissue appreciated posterior  lateral to the descending thoracic aorta which measures 2.6 cm in  thickness (previously approximately 1.5 cm in the same plane).       Impression:      There is diffuse metastatic disease involving the lungs,  thoracic spine and  lumbar spine as described in detail above. There is  also epidural tumor present in the lower thoracic region extending to L1  where it is most severe, accentuated by mild bony retropulsion of L1.  Dural tumor also extends into the neural foramen at L1-L2 and to lesser  extent T12-L1. All of these appear to be new versus a CT examination of  the chest of 05/05/2023 and MRI examination of the lumbar spine of  05/05/2023. The soft tissue mass posterior lateral to the descending  thoracic aorta has increased in thickness from 1.5 cm to 2.6 cm.     The above information was called to and discussed with Dr. Hickman.     This report was finalized on 7/6/2023 7:28 AM by Dr. Tim Dey M.D.       MRI Thoracic Spine With & Without Contrast [[id number]] Collected: 07/05/23 1901     Updated: 07/06/23 0731    Narrative:      MRI EXAMINATION OF THE LUMBAR AND THORACIC SPINE WITH AND WITHOUT  CONTRAST     HISTORY: Kyphoplasty, back pain.     COMPARISON: MRI of the lumbar spine 05/05/2023.     FINDINGS:     MRI EXAMINATION OF THE LUMBAR SPINE WITHOUT CONTRAST:     The patient has undergone kyphoplasty at L1. There is moderate loss of  disc height at L1 and mild bony retropulsion of the posterior body.  There is epidural soft tissue present posterior to the L1 vertebral body  with components that enhance and components that did not enhance. The  nonenhancing component is more prominent to the right of midline and  measures approximately 11 mm in the craniocaudal dimension and 6 mm in  the AP dimension. It is T2 hyperintense.     Multiple areas of T1 hypointensity are appreciated involving the lumbar  spine which are new versus the MRI examination of 05/05/2023. This  includes an area of signal loss involving the T11 vertebral body  measuring approximately 3.5 cm in size, the T12 vertebral body centrally  measuring 2.8 cm, the posterior lateral aspect of T12 to the left (1.9  cm, the lateral aspect of T12 to the right (15 mm)      The lateral aspect of L2 to the left (22 mm), the posterior aspect of L2  (12 mm), the anterior aspect of L2 inferiorly (7 mm), the L3 vertebral  body centrally (2 cm) the L4 vertebral body posteriorly and to the left  of midline (1 cm), the anterolateral aspect of L5 to the left (2.7 cm).  These areas are T2 hyperintense. After contrast administration there was  nonenhancement or minimal enhancement related to these areas. There is  epidural enhancement from T12 to L1 as well as enhancement of the cauda  equina. There is enhancing soft tissue laterally bilaterally at the  level of L1 more prominent to the left where there is involvement of the  medial aspect of the psoas muscle and enhancing tissue extending into  the neural foramen of L1-L2 to the left.     T12-L1: There is mild central canal stenosis. There is mild foraminal  stenosis on the left secondary to soft tissues extending into the neural  foramen which enhances.     L1-L2: There is severe central canal stenosis secondary to enhancing  epidural tissue anteriorly as well as laterally bilaterally and again,  enhancing tissue is appreciated extending into the neural foramen  bilaterally.     L2-L3: There is mild canal stenosis secondary to a broad-based disc  osteophyte complex which is more prominent to the left. Mild foraminal  stenosis is present on the left.     L3-L4: There is mild canal stenosis secondary to a broad-based disc  osteophyte complex and to mild facet degenerative disease.     L4-L5: Mild-to-moderate facet degenerative disease and mild central disc  bulge is noted.     L5-S1: Moderate facet degenerative disease is present bilaterally.     MRI EXAMINATION OF THE THORACIC SPINE WITH AND WITHOUT CONTRAST:     FINDINGS: The alignment of the thoracic spine is within normal limits.  Signal intensity within the cord is normal on the sagittal T2 sequence.  Areas of signal loss are appreciated involving the T9, T10, T11 and T12  vertebral  bodies. Areas of signal loss are also appreciated involving  the lateral aspects of the C7, T1 and T3 vertebral bodies to the left  and involving the T4 vertebral body centrally. There is T2 hyperintense  epidural soft tissue appreciated posterior to the T10 and to lesser  extent T11 vertebral bodies which enhance after contrast administration.  Signal intensity within the cord is normal on the sagittal and axial T2  sequences.     There is severe spinal stenosis present at L1 as described above. There  is moderate canal stenosis secondary to epidural enhancing tissue  beginning at T9/T10 and extending to T10/T11 and mild stenosis due to  epidural enhancing tissue to the left at T11.     Although limited by motion multiple nodular areas of increased signal  are appreciated involving the lungs bilaterally likely representing  diffuse metastatic disease. There is soft tissue appreciated posterior  lateral to the descending thoracic aorta which measures 2.6 cm in  thickness (previously approximately 1.5 cm in the same plane).       Impression:      There is diffuse metastatic disease involving the lungs,  thoracic spine and lumbar spine as described in detail above. There is  also epidural tumor present in the lower thoracic region extending to L1  where it is most severe, accentuated by mild bony retropulsion of L1.  Dural tumor also extends into the neural foramen at L1-L2 and to lesser  extent T12-L1. All of these appear to be new versus a CT examination of  the chest of 05/05/2023 and MRI examination of the lumbar spine of  05/05/2023. The soft tissue mass posterior lateral to the descending  thoracic aorta has increased in thickness from 1.5 cm to 2.6 cm.     The above information was called to and discussed with Dr. Hickman.     This report was finalized on 7/6/2023 7:28 AM by Dr. Tim Dey M.D.               Pertinent Labs     Results from last 7 days   Lab Units 07/11/23  0736   WBC 10*3/mm3 18.15*    HEMOGLOBIN g/dL 10.9*   PLATELETS 10*3/mm3 208     Results from last 7 days   Lab Units 07/11/23  0736   SODIUM mmol/L 134*   POTASSIUM mmol/L 4.5   CHLORIDE mmol/L 107   CO2 mmol/L 17.0*   BUN mg/dL 33*   CREATININE mg/dL 1.12   GLUCOSE mg/dL 112*   Estimated Creatinine Clearance: 52.8 mL/min (by C-G formula based on SCr of 1.12 mg/dL).  Results from last 7 days   Lab Units 07/11/23  0736   ALBUMIN g/dL 1.6*   BILIRUBIN mg/dL 0.4   ALK PHOS U/L 242*   AST (SGOT) U/L 102*   ALT (SGPT) U/L 110*     Results from last 7 days   Lab Units 07/11/23  0736   CALCIUM mg/dL 9.5   ALBUMIN g/dL 1.6*               Invalid input(s): LDLCALC        Test Results Pending at Discharge       Discharge Details        Discharge Medications        Continue These Medications        Instructions Start Date   Accu-Chek Guide Me w/Device kit   See Admin Instructions      Lancets Thin misc   One daily for DMII and PRN for R73.01             ASK your doctor about these medications        Instructions Start Date   Accu-Chek Guide test strip  Generic drug: glucose blood   CHECK BLOOD SUGAR DAILY AND AS NEEDED      acetaminophen 500 MG tablet  Commonly known as: TYLENOL   500 mg, Oral, Every 6 Hours PRN      amLODIPine 10 MG tablet  Commonly known as: NORVASC   10 mg, Oral, Daily      aspirin 81 MG tablet   81 mg, Oral, Daily, HOLDING FOR SURGERY      cilostazol 50 MG tablet  Commonly known as: PLETAL   50 mg, Oral, 2 Times Daily      clopidogrel 75 MG tablet  Commonly known as: PLAVIX   75 mg, Oral, Daily, For heart      esomeprazole 20 MG capsule  Commonly known as: nexIUM   1 capsule, Oral, Before Breakfast      HYDROcodone-acetaminophen 5-325 MG per tablet  Commonly known as: Norco  Ask about: Which instructions should I use?   1 tablet, Oral, Every 6 Hours PRN      lidocaine 5 %  Commonly known as: Lidoderm   1 patch, Transdermal, Every 24 Hours, Remove & Discard patch within 12 hours or as directed by MD      mesalamine 1.2 g EC  tablet  Commonly known as: LIALDA   TK 2 TS PO D WITH KASH      methocarbamol 750 MG tablet  Commonly known as: ROBAXIN   750 mg, Oral, 3 Times Daily PRN      methylPREDNISolone 4 MG dose pack  Commonly known as: MEDROL   Take as directed on package instructions.      metoprolol succinate XL 50 MG 24 hr tablet  Commonly known as: TOPROL-XL   50 mg, Oral, Daily, For HEART      nitroglycerin 0.4 MG SL tablet  Commonly known as: NITROSTAT   0.4 mg, Sublingual, Every 5 Minutes PRN      pancrelipase (Lip-Prot-Amyl) 99130-79621 units capsule delayed-release particles capsule  Commonly known as: CREON   12,000 units of lipase, Oral, 3 Times Daily With Meals      PROSTATE HEALTH PO   1 tablet, Oral, Daily      rosuvastatin 40 MG tablet  Commonly known as: Crestor   40 mg, Oral, Daily, For cholesterol      traMADol 50 MG tablet  Commonly known as: ULTRAM   50 mg, Oral, Every 8 Hours PRN               Allergies   Allergen Reactions    Atorvastatin Swelling, Hallucinations and Myalgia       Joint pain         Discharge Disposition:  Hospice/Medical Facility ([facility])    Discharge Diet:  Diet Order   Procedures    Diet: Regular/House Diet; Texture: Regular Texture (IDDSI 7); Fluid Consistency: Thin (IDDSI 0)       Discharge Activity:       CODE STATUS:    Code Status and Medical Interventions:   Ordered at: 07/11/23 1017     Code Status (Patient has no pulse and is not breathing):    No CPR (Do Not Attempt to Resuscitate)     Medical Interventions (Patient has pulse or is breathing):    Comfort Measures       Future Appointments   Date Time Provider Department Center   7/28/2023  8:00 AM [facility]  [facility]      Follow-up Information       Tiarra Yi PAJovanyC .    Specialty: Family Medicine  Contact information:  [address] [address] [address] [phone]                             Time Spent on Discharge:  Greater than 30 minutes      Sotero Brock MD  [facility]  07/17/23  16:17 EDT

## 2023-07-17 NOTE — PROGRESS NOTES
Name: Bob Monahan ADMIT: 2023   : 1950  PCP: Tiarra Yi PA-C    MRN: [id number] LOS: 11 days   AGE/SEX: 73 y.o. male  ROOM: [facility]     Subjective   Subjective     Multiple family members at bedside. He has required 5 doses of iv dilaudid 1.5 mg, 2 doses of dilaudid 1 mg iv, 6 doses of iv ativan and reportedly was still having out of control pain with turns. A fentayl patch 100 mcg was added to this regimen and when I saw him this afternoon, he appeared to be resting peacefully        Objective   Objective   Vital Signs  Temp:  [98.3 °F (36.8 °C)-99.3 °F (37.4 °C)] 98.3 °F (36.8 °C)  Heart Rate:  [] 111  Resp:  [18] 18  SpO2:  [74 %-76 %] 76 %  on  Flow (L/min):  [5] 5;   Device (Oxygen Therapy): nasal cannula;humidified  Body mass index is 23.69 kg/m².  Physical Exam  Constitutional:       General: He is not in acute distress.     Appearance: He is not toxic-appearing.   Cardiovascular:      Rate and Rhythm: Regular rhythm. Tachycardia present.      Heart sounds: Normal heart sounds.   Pulmonary:      Effort: Pulmonary effort is normal.      Breath sounds: Rhonchi present.   Abdominal:      General: Bowel sounds are normal.      Palpations: Abdomen is soft.   Musculoskeletal:         General: No tenderness.      Right lower leg: No edema.      Left lower leg: No edema.   Neurological:      Mental Status: He is unresponsive.       Results Review     I reviewed the patient's new clinical results.  Results from last 7 days   Lab Units 23  0736   WBC 10*3/mm3 18.15*   HEMOGLOBIN g/dL 10.9*   PLATELETS 10*3/mm3 208       Results from last 7 days   Lab Units 23  0736   SODIUM mmol/L 134*   POTASSIUM mmol/L 4.5   CHLORIDE mmol/L 107   CO2 mmol/L 17.0*   BUN mg/dL 33*   CREATININE mg/dL 1.12   GLUCOSE mg/dL 112*     Estimated Creatinine Clearance: 52.8 mL/min (by C-G formula based on SCr of 1.12 mg/dL).  Results from last 7 days   Lab Units 23  0736   ALBUMIN g/dL 1.6*    BILIRUBIN mg/dL 0.4   ALK PHOS U/L 242*   AST (SGOT) U/L 102*   ALT (SGPT) U/L 110*       Results from last 7 days   Lab Units 07/11/23  0736   CALCIUM mg/dL 9.5   ALBUMIN g/dL 1.6*       No results found for: COVID19  No results found for: HGBA1C, POCGLU    XR Chest 1 View  X-RAY CHEST ONE VIEW     HISTORY: 73-year-old male status post CT-guided biopsy earlier today.     FINDINGS: There are innumerable pulmonary nodules in both lungs.  There  is no evidence for a pneumothorax. No new abnormality is seen.     This report was finalized on 7/11/2023 7:29 AM by Dr. Aura Womack M.D.     XR Knee 3 View Right  X-RAY KNEE THREE-VIEW RIGHT     HISTORY: 73-year-old male with pain and swelling of the right knee.     FINDINGS: There is likely a sizable suprapatellar joint effusion and  there may also be soft tissue swelling at the overlying soft tissues.  There are mild-moderate osteoarthritic changes, most prominent at the  lateral tibiofemoral compartment and there is chondrocalcinosis.     This report was finalized on 7/11/2023 7:27 AM by Dr. Aura Womack M.D.       Scheduled Medications  fentaNYL, 1 patch, Transdermal, Q72H   And  [START ON 7/18/2023] Check Fentanyl Patch Placement, 1 each, Does not apply, Q12H  pancrelipase (Lip-Prot-Amyl), 12,000 units of lipase, Oral, TID With Meals  pantoprazole, 40 mg, Oral, Q AM    Infusions   Diet  Diet: Regular/House Diet; Texture: Regular Texture (IDDSI 7); Fluid Consistency: Thin (IDDSI 0)       Assessment/Plan     Active Hospital Problems    Diagnosis  POA    **Postoperative back pain [G89.18, M54.9]  Yes    Status post kyphoplasty [Z98.890]  Not Applicable    Tachycardia [R00.0]  Yes    History of esophageal dysphagia [Z87.898]  Yes    Leukocytosis [D72.829]  Yes    History of recent steroid use [Z92.241]  Not Applicable    Difficulty walking [R26.2]  Yes    Constipated [K59.00]  Yes    Compression fracture of L1 vertebra [S32.010A]  Yes    Impaired fasting glucose [R73.01]   Yes    History of aortic valve replacement [Z95.2]  Not Applicable    S/P CABG (coronary artery bypass graft) [Z95.1]  Not Applicable    Chronic renal insufficiency, stage III (moderate) [N18.30]  Yes    Benign essential hypertension [I10]  Yes      Resolved Hospital Problems   No resolved problems to display.       73 y.o. male admitted with Postoperative back pain.    Suspected metastatic disease to the thoracic and lumbar spine with suspect lung primary-s/p bronchoscopy and biopsy of left hilar mass on 5/31/23 with pathology non-diagnostic for malignancy and s/p L1 kyphoplasty on 6/27/23 with bone biopsy negative for malignancy and s/p ct guided lung biopsy on 7/10/23 which showed poorly differentiated non-small cell malignancy (though it was felt that additional tissue would be needed to confirm the diagnosis). The patient's family has decided to pursue comfort care. All meds and therapies not for comfort have been stopped and the palliative care order set has been initiated. Hospice is consulted. Added a fentanyl patch 100 mcg today  History of L1 compression fracture s/p kyphoplasty on 6/27/23  Coronary artery disease s/p cabg-on dapt as an outpatient   ILD-follows with Dr. Tidwell  CKD 3b-no longer checking labs  GERD-ppi  Chronic colitis-on mesalamine as an outpatient. Follows with [facility]  Pancreatic cystic/duct dilation-creon. Follows with [facility]  Right knee effusion-orthopedic surgery evaluated and family declined arthrocentesis  DVT ppx is unnecessary  Code: comfort care only  Discussed with spouse, family, and nursing staff.  Anticipate discharge  Hospice scatter bed  once arrangements have been made.      Sotero Brock MD  [facility]  07/17/23  14:18 EDT    I wore protective equipment throughout this patient encounter including a face mask, gloves and protective eyewear.  Hand hygiene was performed before donning protective equipment and after removal when leaving the  room.

## 2023-07-17 NOTE — PLAN OF CARE
Goal Outcome Evaluation:   PPS 10%. Pt with increased pain and agitation overnight. Tense and rigid with continuous moaning at times. Pt did not tolerate any turns to side laying position. Medication increased to 1.5mg dilaudid and 2mg ativan. Medicated almost hourly. IV therapy called to check iv patency and they confirmed it was okay. Sister at bedside through the night. Support and active listening provided. Called [facility] @ 2005 to pickup agiliti low airloss mattress. Family does not want to move the pt to new mattress due to pain. Will continue to monitor per palliative goals of care.

## 2023-07-18 NOTE — DISCHARGE SUMMARY
Name: Bob Monahan  :  1950  MRN: [id number]         Primary Care Physician: Tiarra Yi PA-C      Date of Admission:  2023  Date and Time of Death:  2023 at 9:40 PM    Principle Cause of Death:   Suspected lung cancer metastatic to the liver and spine    Secondary Diagnoses:     Benign essential HTN    Elevated cholesterol    Chronic renal impairment, stage 3 (moderate)    Coronary artery disease involving native coronary artery of native heart    Collagenous colitis    Pancreatic mass    Status post kyphoplasty    Malignant neoplasm of bronchus and lung    Metastasis to bone    Metastasis to liver        Hospital Course  Patient was a 73 y.o. male with coronary artery disease s/p CABG, ILD, and a perihilar mass recently biopsied in May which was non-diagnostic for malignancy, ckd 3b, GERD, collagenous colitis, pancreatic cystic/duct dilation, and a recent L1 compression fracture status post kyphoplasty on 2023 (pathology negative for malignancy) who presented to [facility] with worsening low back pain after his surgery. Please see the admitting history and physical for further details.     He was found to have evidence of metastatic disease involving the thoracic and lumbar spine with an epidural tumor in the lower thoracic region.  Additional imaging showed a marked interval increase in the homogenous soft tissue in the left perihilar and posterior left lower lobes as well as bulky mediastinal adenopathy.  He underwent CT-guided lung biopsy on 7/10/2023 which showed poorly differentiated non-small cell malignancy, though additional tissue was felt to be needed to obtain a specific diagnosis.  Oncology did not feel the patient was a candidate for any kind of systemic therapy, and so no additional tissue sampling was pursued.  Due to his advanced disease and poor symptom control, hospice care was strongly recommended, and the patient was transferred to [facility] where all  therapies not for comfort were stopped, and the palliative care order set was initiated.  Hospice evaluated the patient, and he was transition to a hospice scatter bed, where he  peacefully.    Sotero Brock MD  23  15:17 EDT

## 2023-07-18 NOTE — PROGRESS NOTES
Continued Stay Note  [facility]     Patient Name: Bob Monahan  MRN: [id number]  Today's Date: 2023    Admit Date: 2023        Discharge Plan       Row Name 23 1230       Plan    Final Discharge Disposition Code 41 -  in medical facility    Final Note The patient  on 23 @ 21:40. CLARISSA Arguelles RN ,CCP.                   Discharge Codes    No documentation.                 Expected Discharge Date and Time       Expected Discharge Date Expected Discharge Time    2023               Laura Arguelles, RN

## 2025-05-12 NOTE — NURSING NOTE
Date of Injury:  3/19/2025        First Date Seen:  03/19/25    Subjective:  Patient presents with 2 on a 10 pain scale today.  Patient reports an episode of neck pain that was triggered by sleeping in awkward position.    Objective: Taut and tender fibers are noted in the C4, C5, C6, C7, T1, T2, T3, T4, L5, sacroiliac left, and sacroiliac right regions.  Bilateral knee flexion test is positive.  Hip internal and external rotation are within normal limits and negative  for increasing pain.    Opinion:Diagnosis of mechanical low back pain, sacroiliac joint dysfunction, mechanical neck pain, and thoracic spine pain. Patient progressing as expected.    Options: Patient's options other than care in this office include home based advice to remain active and against bed rest. Patient is advised to perform Brugger's relief posture and Carolynn extension exercises home based exercise.    Advice: Patient is advised to continue with home program.    Agreed Plan: Patient will follow up on as needed basis.    Procedures:  The patient understands and agrees to the following procedures:  Chiropractic manipulative treatment is performed at C4, C5, C6, C7, T1, T2, T3, T4, L5, sacroiliac left, and sacroiliac right.  Vibratory massage performed in the trapezius, lumbar region for a period of 5 minutes.    Post-manipulation audit reveals an increase in range of motion and decrease in pain.         Report from Right Lung bx called to NADIA Shoemaker

## (undated) DEVICE — GOWN,PREVENTION PLUS,XLONG/XLARGE,STRL: Brand: MEDLINE

## (undated) DEVICE — TRAP,MUCUS SPECIMEN, 80CC: Brand: MEDLINE

## (undated) DEVICE — MSK AIRWY LARYNG LMA PILOT SZ4

## (undated) DEVICE — STERILE COTTON TIP 6IN 10PK: Brand: MEDLINE

## (undated) DEVICE — SUT SILK 3/0 SH CR5 18IN C0135

## (undated) DEVICE — ADHS SKIN DERMABOND PROPEN

## (undated) DEVICE — PK ENDART CARTOID 40

## (undated) DEVICE — ANTIBACTERIAL UNDYED BRAIDED (POLYGLACTIN 910), SYNTHETIC ABSORBABLE SUTURE: Brand: COATED VICRYL

## (undated) DEVICE — GLV SURG SENSICARE GREEN W/ALOE PF LF 8.5 STRL

## (undated) DEVICE — SUT SILK 3/0 TIES 18IN A184H

## (undated) DEVICE — SENSR O2 OXIMAX FNGR A/ 18IN NONSTR

## (undated) DEVICE — GAS SAMPLING LINE,10,MM,0.047ID,CO-EX: Brand: MEDLINE

## (undated) DEVICE — BLD TONG INDIV/WRP A/ 6IN STRL

## (undated) DEVICE — ADAPT SWVL FIBROPTIC BRONCH

## (undated) DEVICE — SUT VIC 4/0 SH 27IN J415H

## (undated) DEVICE — GLV SURG BIOGEL LTX PF 8 1/2

## (undated) DEVICE — LARGE BORE STOPCOCK WITH EXTENSION SET, MALE LUER LOCK ADAPTER WITH RETRACTABLE COLLAR

## (undated) DEVICE — DRSNG WND GZ PAD BORDERED 4X8IN STRL

## (undated) DEVICE — CATHETER,URETHRAL,REDRUBBER,STERILE,20FR: Brand: MEDLINE

## (undated) DEVICE — VITAL SIGNS™ JACKSON-REES CIRCUITS: Brand: VITAL SIGNS™

## (undated) DEVICE — Device: Brand: BALLOON

## (undated) DEVICE — TP UMB COTN 1/8X36 U12T

## (undated) DEVICE — TUBING, SUCTION, 1/4" X 10', STRAIGHT: Brand: MEDLINE

## (undated) DEVICE — SUT PROLN 6/0 BV1 D/A 30IN 8709H

## (undated) DEVICE — SINGLE USE SUCTION VALVE MAJ-209: Brand: SINGLE USE SUCTION VALVE (STERILE)

## (undated) DEVICE — BITEBLOCK OMNI BLOC

## (undated) DEVICE — SUT SILK 4/0 TIES 18IN A183H

## (undated) DEVICE — SOL NS 500ML

## (undated) DEVICE — SUT SILK 2/0 TIES 18IN A185H

## (undated) DEVICE — STPLR SKIN VISISTAT WD 35CT

## (undated) DEVICE — FRCP BX RADJAW4 PULM WO NDL STD1.8X2 100

## (undated) DEVICE — Device: Brand: SINGLE USE ASPIRATION NEEDLE NA-U401SX

## (undated) DEVICE — SUNDT™ EXTERNAL CAROTID ENDARTERECTOMY SHUNT: Brand: SUNDT™

## (undated) DEVICE — ADAPT CLN BIOGUARD AIR/H2O DISP

## (undated) DEVICE — SINGLE USE BIOPSY VALVE MAJ-210: Brand: SINGLE USE BIOPSY VALVE (STERILE)